# Patient Record
Sex: MALE | Race: WHITE | NOT HISPANIC OR LATINO | Employment: FULL TIME | URBAN - METROPOLITAN AREA
[De-identification: names, ages, dates, MRNs, and addresses within clinical notes are randomized per-mention and may not be internally consistent; named-entity substitution may affect disease eponyms.]

---

## 2017-02-16 ENCOUNTER — HOSPITAL ENCOUNTER (EMERGENCY)
Facility: HOSPITAL | Age: 37
Discharge: HOME | End: 2017-02-16
Attending: EMERGENCY MEDICINE | Admitting: EMERGENCY MEDICINE
Payer: COMMERCIAL

## 2017-02-16 VITALS
DIASTOLIC BLOOD PRESSURE: 79 MMHG | HEART RATE: 81 BPM | RESPIRATION RATE: 20 BRPM | TEMPERATURE: 98.6 F | BODY MASS INDEX: 26.58 KG/M2 | OXYGEN SATURATION: 95 % | WEIGHT: 180 LBS | SYSTOLIC BLOOD PRESSURE: 165 MMHG

## 2017-02-16 DIAGNOSIS — H61.20 CERUMEN IMPACTION: Primary | ICD-10-CM

## 2017-02-16 PROCEDURE — 99282 EMERGENCY DEPT VISIT SF MDM: CPT

## 2018-01-17 NOTE — PROGRESS NOTES
Preliminary Nursing Report                Patient Information    Initial Encounter Entry Date:   9/15/2016 8:36 AM EST (Automated Transmission Automated Transmission)       Last Modified:   {Lisandro Singh}              Legal Name: Ziggy Ritchie,[address] [place] [address] Number:        YOB: 1980        Age (years): 29        Gender: M        Body Mass Index (BMI): 2 kg/m2        Height: 67 in  Weight: 10 94 lbs (5 kgs)           Address:   [address] [place] [address]              Phone: [phone]   (consent to leave messages)        Email:        Ethnicity: Decline to State        Yarsani:        Marital Status:        Preferred Language: English        Race: Other Race                    Patient Insurance Information        Primary Insurance Information Carrier Name: {Primary  CarrierName}           Carrier Address:   {Primary  CarrierAddress}              Carrier Phone: {Primary  CarrierPhone}          Group Number: {Primary  GroupNumber}          Policy Number: {Primary  PolicyNumber}          Insured Name: {Primary  InsuredName}          Insured : {Primary  InsuredDOB}          Relationship to Insured: {Primary  RelationshiptoInsured}           Secondary Insurance Information Carrier Name: {Secondary  CarrierName}           Carrier Address:   {Secondary  CarrierAddress}              Carrier Phone: {Secondary  CarrierPhone}          Group Number: {Secondary  GroupNumber}          Policy Number: {Secondary  PolicyNumber}          Insured Name: {Secondary  InsuredName}          Insured : {Secondary  InsuredDOB}          Relationship to Insured: {Secondary  RelationshiptoInsured}                       Health Profile   Booking #:   Valerie Palacios #: [id number]               DOS: 2016    Surgery : LITHOTRIPSY, EXTRACORPOREAL SHOCK WAVE    Add'l Procedures/Notes:     Surgery Risk: Intermediate          Precautions     BMI                   Allergies    No Known Drug Allergies Medications    PARoxetine HCl - 10 MG Oral Tablet               Conditions    Acid reflux       Back pain       Nephrolithiasis               Family History    None             Surgical History    None             Social History    Never a smoker       No alcohol use                               Patient Instructions       ? NPO Instructions   The day before surgery it is recommended to have a light dinner at your usual time and you are allowed a light snack early in the evening  Do not eat anything heavy or eat a big meal after 7pm  Do not eat or drink anything after midnight prior to your surgery  If you are supposed to take any of your medications, do so with a sip of water  Failure to follow these instructions can lead to an increased risk of lung complications and may result in a delay or cancellation of your procedure  If you have any questions, contact your institution for further instructions  No candy, no gum, no mints, no chewing tobacco   Triggered by: Medical Procedure Risk         ? Reflux Disease   Please continue to take this medication on your normal schedule  If this is an oral medication and you take in the morning, you may do so with a sip of water  Triggered by: Acid reflux         ? SSRI (Antidepressants) 80  Please continue to take this medication on your normal schedule  If this is an oral medication and you take in the morning, you may do so with a sip of water  Triggered by: PARoxetine HCl - 10 MG Oral Tablet               Testing Considerations       No recommendations for this classification  Consultations       ? Primary Care Physician Evaluation   Primary care physician may need to evaluate patient prior to surgery  This is likely NOT necessary if the listed conditions are chronic and stable    Triggered by: Nephrolithiasis               Miscellaneous Questions         Question: Are you able to walk up a flight of stairs, walk up a hill or do heavy housework WITHOUT having chest pain or shortness of breath? Answer: YES                   Allergies/Conditions/Medications Not Found        The following were not recognized by our system when generating the recommendations  Please consider if this would impact any preoperative protocols  ? Never a smoker       ? No alcohol use                  Appointment Information         Date:    11/04/2016        Location:    [place]        Address:           Directions:                      Footnotes revision 14      ?? Denotes a free-text entry  Legal Disclaimer: Any and all recommendations and services provided herein are designed to assist in the preoperative care of the patient  Nothing contained herein is designed to replace, eliminate or alleviate the responsibility of the attending physician to supervise and determine the patient?s preoperative care and course of treatment  Failure to provide complete, accurate information may negatively impact the system?s ability to recommend the proper preoperative protocol  THE ATTENDING PHYSICIAN IS RESPONSIBLE TO REVIEW THE SUGGESTED PREOPERATIVE PROTOCOLS/COURSE OF TREATMENT AND PRESCRIBE THE FINAL COURSE OF PREOPERATIVE TREATMENT IN CONSULTATION WITH THE PATIENT  THE ePREOP SYSTEM AND ITS MATERIALS ARE PROVIDED ? AS IS? WITHOUT WARRANTY OF ANY KIND, EXPRESS OR IMPLIED, INCLUDING, BUT NOT LIMITED TO, WARRANTIES OF PERFORMANCE OR MERCHANTABILITY OR FITNESS FOR A PARTICULAR PURPOSE  PATIENT AND PHYSICIANS HEREBY AGREE THAT THEIR USE OF THE MATERIALS AND RESOURCES ACT AS A CONSENT TO RELEASE AND WAIVE ePREOP FROM ANY AND ALL CLAIMS OF WARRANTY, TORT OR CONTRACT LAW OF ANY KIND  Electronically signed Vijay OJEDA    Sep 15 2016 12:12PM EST

## 2018-03-20 ENCOUNTER — TRANSCRIBE ORDERS (OUTPATIENT)
Dept: LAB | Facility: CLINIC | Age: 38
End: 2018-03-20

## 2018-03-20 ENCOUNTER — APPOINTMENT (OUTPATIENT)
Dept: LAB | Facility: CLINIC | Age: 38
End: 2018-03-20
Payer: COMMERCIAL

## 2018-03-20 DIAGNOSIS — R10.9 STOMACH ACHE: Primary | ICD-10-CM

## 2018-03-20 DIAGNOSIS — R10.9 STOMACH ACHE: ICD-10-CM

## 2018-03-20 LAB
ALBUMIN SERPL BCP-MCNC: 3.9 G/DL (ref 3.5–5)
ALP SERPL-CCNC: 52 U/L (ref 46–116)
ALT SERPL W P-5'-P-CCNC: 117 U/L (ref 12–78)
ANION GAP SERPL CALCULATED.3IONS-SCNC: 7 MMOL/L (ref 4–13)
AST SERPL W P-5'-P-CCNC: 50 U/L (ref 5–45)
BASOPHILS # BLD AUTO: 0.04 THOUSANDS/ΜL (ref 0–0.1)
BASOPHILS NFR BLD AUTO: 1 % (ref 0–1)
BILIRUB SERPL-MCNC: 0.6 MG/DL (ref 0.2–1)
BUN SERPL-MCNC: 15 MG/DL (ref 5–25)
CALCIUM SERPL-MCNC: 9.3 MG/DL (ref 8.3–10.1)
CHLORIDE SERPL-SCNC: 105 MMOL/L (ref 100–108)
CO2 SERPL-SCNC: 29 MMOL/L (ref 21–32)
CREAT SERPL-MCNC: 0.99 MG/DL (ref 0.6–1.3)
EOSINOPHIL # BLD AUTO: 0.15 THOUSAND/ΜL (ref 0–0.61)
EOSINOPHIL NFR BLD AUTO: 4 % (ref 0–6)
ERYTHROCYTE [DISTWIDTH] IN BLOOD BY AUTOMATED COUNT: 12.9 % (ref 11.6–15.1)
ERYTHROCYTE [SEDIMENTATION RATE] IN BLOOD: 2 MM/HOUR (ref 0–10)
GFR SERPL CREATININE-BSD FRML MDRD: 98 ML/MIN/1.73SQ M
GLUCOSE P FAST SERPL-MCNC: 99 MG/DL (ref 65–99)
HCT VFR BLD AUTO: 45 % (ref 36.5–49.3)
HGB BLD-MCNC: 15.6 G/DL (ref 12–17)
LYMPHOCYTES # BLD AUTO: 2.29 THOUSANDS/ΜL (ref 0.6–4.47)
LYMPHOCYTES NFR BLD AUTO: 55 % (ref 14–44)
MCH RBC QN AUTO: 29.1 PG (ref 26.8–34.3)
MCHC RBC AUTO-ENTMCNC: 34.7 G/DL (ref 31.4–37.4)
MCV RBC AUTO: 84 FL (ref 82–98)
MONOCYTES # BLD AUTO: 0.39 THOUSAND/ΜL (ref 0.17–1.22)
MONOCYTES NFR BLD AUTO: 9 % (ref 4–12)
NEUTROPHILS # BLD AUTO: 1.3 THOUSANDS/ΜL (ref 1.85–7.62)
NEUTS SEG NFR BLD AUTO: 31 % (ref 43–75)
PLATELET # BLD AUTO: 266 THOUSANDS/UL (ref 149–390)
PMV BLD AUTO: 9.4 FL (ref 8.9–12.7)
POTASSIUM SERPL-SCNC: 4.5 MMOL/L (ref 3.5–5.3)
PROT SERPL-MCNC: 7.1 G/DL (ref 6.4–8.2)
RBC # BLD AUTO: 5.36 MILLION/UL (ref 3.88–5.62)
SODIUM SERPL-SCNC: 141 MMOL/L (ref 136–145)
WBC # BLD AUTO: 4.17 THOUSAND/UL (ref 4.31–10.16)

## 2018-03-20 PROCEDURE — 85025 COMPLETE CBC W/AUTO DIFF WBC: CPT

## 2018-03-20 PROCEDURE — 80053 COMPREHEN METABOLIC PANEL: CPT

## 2018-03-20 PROCEDURE — 85652 RBC SED RATE AUTOMATED: CPT

## 2018-03-20 PROCEDURE — 36415 COLL VENOUS BLD VENIPUNCTURE: CPT

## 2018-04-24 ENCOUNTER — OFFICE VISIT (OUTPATIENT)
Dept: GASTROENTEROLOGY | Facility: AMBULARY SURGERY CENTER | Age: 38
End: 2018-04-24
Payer: COMMERCIAL

## 2018-04-24 ENCOUNTER — TELEPHONE (OUTPATIENT)
Dept: GASTROENTEROLOGY | Facility: MEDICAL CENTER | Age: 38
End: 2018-04-24

## 2018-04-24 ENCOUNTER — APPOINTMENT (OUTPATIENT)
Dept: LAB | Facility: CLINIC | Age: 38
End: 2018-04-24
Payer: COMMERCIAL

## 2018-04-24 ENCOUNTER — TRANSCRIBE ORDERS (OUTPATIENT)
Dept: LAB | Facility: CLINIC | Age: 38
End: 2018-04-24

## 2018-04-24 VITALS
WEIGHT: 192 LBS | HEART RATE: 78 BPM | DIASTOLIC BLOOD PRESSURE: 70 MMHG | HEIGHT: 69 IN | TEMPERATURE: 98 F | SYSTOLIC BLOOD PRESSURE: 130 MMHG | BODY MASS INDEX: 28.44 KG/M2

## 2018-04-24 DIAGNOSIS — K21.9 CHRONIC GERD: ICD-10-CM

## 2018-04-24 DIAGNOSIS — R79.89 ABNORMAL LFTS: ICD-10-CM

## 2018-04-24 DIAGNOSIS — R19.4 CHANGE IN BOWEL HABITS: Primary | ICD-10-CM

## 2018-04-24 DIAGNOSIS — R19.7 DIARRHEA, UNSPECIFIED TYPE: Primary | ICD-10-CM

## 2018-04-24 DIAGNOSIS — R19.7 DIARRHEA, UNSPECIFIED TYPE: ICD-10-CM

## 2018-04-24 LAB
ALBUMIN SERPL BCP-MCNC: 3.9 G/DL (ref 3.5–5)
ALP SERPL-CCNC: 57 U/L (ref 46–116)
ALT SERPL W P-5'-P-CCNC: 126 U/L (ref 12–78)
ANION GAP SERPL CALCULATED.3IONS-SCNC: 9 MMOL/L (ref 4–13)
AST SERPL W P-5'-P-CCNC: 57 U/L (ref 5–45)
BILIRUB SERPL-MCNC: 0.7 MG/DL (ref 0.2–1)
BUN SERPL-MCNC: 11 MG/DL (ref 5–25)
CALCIUM SERPL-MCNC: 9 MG/DL (ref 8.3–10.1)
CHLORIDE SERPL-SCNC: 105 MMOL/L (ref 100–108)
CO2 SERPL-SCNC: 26 MMOL/L (ref 21–32)
CREAT SERPL-MCNC: 0.98 MG/DL (ref 0.6–1.3)
GFR SERPL CREATININE-BSD FRML MDRD: 99 ML/MIN/1.73SQ M
GLUCOSE P FAST SERPL-MCNC: 105 MG/DL (ref 65–99)
POTASSIUM SERPL-SCNC: 4.2 MMOL/L (ref 3.5–5.3)
PROT SERPL-MCNC: 7.3 G/DL (ref 6.4–8.2)
SODIUM SERPL-SCNC: 140 MMOL/L (ref 136–145)
TSH SERPL DL<=0.05 MIU/L-ACNC: 3.11 UIU/ML (ref 0.36–3.74)

## 2018-04-24 PROCEDURE — 86803 HEPATITIS C AB TEST: CPT

## 2018-04-24 PROCEDURE — 86704 HEP B CORE ANTIBODY TOTAL: CPT

## 2018-04-24 PROCEDURE — 87340 HEPATITIS B SURFACE AG IA: CPT

## 2018-04-24 PROCEDURE — 36415 COLL VENOUS BLD VENIPUNCTURE: CPT

## 2018-04-24 PROCEDURE — 86255 FLUORESCENT ANTIBODY SCREEN: CPT

## 2018-04-24 PROCEDURE — 82784 ASSAY IGA/IGD/IGG/IGM EACH: CPT

## 2018-04-24 PROCEDURE — 86705 HEP B CORE ANTIBODY IGM: CPT

## 2018-04-24 PROCEDURE — 99244 OFF/OP CNSLTJ NEW/EST MOD 40: CPT | Performed by: INTERNAL MEDICINE

## 2018-04-24 PROCEDURE — 84443 ASSAY THYROID STIM HORMONE: CPT

## 2018-04-24 PROCEDURE — 83516 IMMUNOASSAY NONANTIBODY: CPT

## 2018-04-24 PROCEDURE — 80053 COMPREHEN METABOLIC PANEL: CPT

## 2018-04-24 RX ORDER — POLYETHYLENE GLYCOL 3350 17 G/17G
238 POWDER, FOR SOLUTION ORAL ONCE
Qty: 238 G | Refills: 0 | Status: SHIPPED | OUTPATIENT
Start: 2018-04-24 | End: 2019-08-13 | Stop reason: ALTCHOICE

## 2018-04-24 RX ORDER — OMEPRAZOLE 10 MG/1
10 CAPSULE, DELAYED RELEASE ORAL DAILY
COMMUNITY
End: 2020-07-31 | Stop reason: SDUPTHER

## 2018-04-24 RX ORDER — DICYCLOMINE HYDROCHLORIDE 10 MG/1
10 CAPSULE ORAL
Qty: 90 CAPSULE | Refills: 3 | Status: SHIPPED | OUTPATIENT
Start: 2018-04-24 | End: 2020-09-09

## 2018-04-24 NOTE — PROGRESS NOTES
Consultation - [facility]  Anne-Marie Langford 40 y o  male MRN: [id number]  Unit/Bed#:  Encounter: [id number]        Consults    ASSESSMENT/PLAN:   1  Change in bowel habits with Diarrhea for 3 months: 3-4 times a day and mainly post prandial  Non bloody  No recent change in medications  Differential includes IBS D versus inflammatory bowel disease versus less likely infectious  Patient does have family history of Crohn's disease in his father   - Hgb 15 6, platelets 128   - check TSH and celiac serologies  - obtain colonoscopy  - check stool studies and inflammatory markers  - start probiotic and low dose Bentyl PRN  -avoid dairy products  2  Longstanding GERD: improved with Protonix 40mg daily  - will plan for EGD to assess for PUD vs gastritis  3  Abnormal LFTs with AST 50 and   No new medications and no history of IV drug use or blood transfusions  Suspect transaminitis may be due to an acute viral infection versus fatty liver disease  No reports of alcohol use  - Check CMP  - check chronic hepatitis panel    - obtain RUQ US for abnormal lfts and to assess for biliary colic given abdominal pain             ______________________________________________________________________    Reason for Consult / Principal Problem: [unfilled]    HPI: Anne-Marie Langford is a 40y o  year old male with history of longstanding GERD, comes in for evaluation of recent onset of diarrhea  Patient states that he use to have normal bowel movements prior to 3 months ago however has most recently been having 3-4 watery bowel movements which are postprandial   He states that he has noticed diarrhea more with dairy products  He denies any nocturnal symptoms, no rectal bleeding or unintentional weight loss  He does endorse abdominal cramping prior to diarrhea  He also had symptoms of acid reflux and was recently started on omeprazole 40 mg with good control of symptoms    He denies dysphagia, hematemesis, melena, nausea or vomiting or unintentional weight loss  He is a nonsmoker and does not drink alcohol  He denies family history of GI malignancy  He does endorse use of NSAIDs which she takes for joint aches  He states that he has been taking Imodium for diarrhea  No recent travel or antibiotic use  No sick contacts  Review of Systems: The remainder of the review of systems was negative except for the pertinent positives noted in HPI  Historical Information   Past Medical History:   Diagnosis Date    Psychiatric disorder     depression    Renal disorder     stones     Past Surgical History:   Procedure Laterality Date    NV FRAGMENT KIDNEY STONE/ ESWL Left 11/4/2016    Procedure: LITHROTRIPSY EXTRACORPORAL SHOCKWAVE (ESWL); Surgeon: Jame Bradley MD;  [facility];  Service: Urology    TOOTH EXTRACTION       Social History   History   Alcohol Use    Yes     Comment: socially     History   Drug Use No     History   Smoking Status    Light Tobacco Smoker    Last attempt to quit: 10/4/2016   Smokeless Tobacco    Never Used     Comment: 1 cig daily, trying to quit 4/2018  Family History   Problem Relation Age of Onset   Sophia Rojas Migraines Mother     Crohn's disease Father     Liver disease Brother     Tourette syndrome Brother     Diabetes Paternal Grandmother        Meds/Allergies       (Not in a hospital admission)  No current facility-administered medications for this visit  No Known Allergies    Objective     Blood pressure 130/70, pulse 78, temperature 98 °F (36 7 °C), temperature source Tympanic, height 5' 9" (1 753 m), weight 87 1 kg (192 lb)      [unfilled]    PHYSICAL EXAM     GEN: well nourished, well developed, no acute distress  HEENT: anicteric, MMM, no cervical or supraclavicular lymphadenopathy  CV: RRR, no m/r/g  CHEST: CTA b/l, no WRR  ABD: +BS, soft, NT/ND, no hepatosplenomegaly  EXT: no c/c/e  SKIN: no rashes,  NEURO: aaox3    Lab Results:   No visits with results within 1 Day(s) from this visit     Latest known visit with results is:   Appointment on 03/20/2018   Component Date Value    Sodium 03/20/2018 141     Potassium 03/20/2018 4 5     Chloride 03/20/2018 105     CO2 03/20/2018 29     Anion Gap 03/20/2018 7     BUN 03/20/2018 15     Creatinine 03/20/2018 0 99     Glucose, Fasting 03/20/2018 99     Calcium 03/20/2018 9 3     AST 03/20/2018 50*    ALT 03/20/2018 117*    Alkaline Phosphatase 03/20/2018 52     Total Protein 03/20/2018 7 1     Albumin 03/20/2018 3 9     Total Bilirubin 03/20/2018 0 60     eGFR 03/20/2018 98     WBC 03/20/2018 4 17*    RBC 03/20/2018 5 36     Hemoglobin 03/20/2018 15 6     Hematocrit 03/20/2018 45 0     MCV 03/20/2018 84     MCH 03/20/2018 29 1     MCHC 03/20/2018 34 7     RDW 03/20/2018 12 9     MPV 03/20/2018 9 4     Platelets 20/62/5865 266     Neutrophils Relative 03/20/2018 31*    Lymphocytes Relative 03/20/2018 55*    Monocytes Relative 03/20/2018 9     Eosinophils Relative 03/20/2018 4     Basophils Relative 03/20/2018 1     Neutrophils Absolute 03/20/2018 1 30*    Lymphocytes Absolute 03/20/2018 2 29     Monocytes Absolute 03/20/2018 0 39     Eosinophils Absolute 03/20/2018 0 15     Basophils Absolute 03/20/2018 0 04     Sed Rate 03/20/2018 2      Imaging Studies: I have personally reviewed pertinent films in PACS

## 2018-04-24 NOTE — LETTER
April 24, 2018     [facility] [address] [address]    Patient: Wendy Adan   YOB: 1980   Date of Visit: 4/24/2018       Dear Dr Laure Smiley:    Thank you for referring Rupinder Naylor to me for evaluation  Below are my notes for this consultation  If you have questions, please do not hesitate to call me  I look forward to following your patient along with you           Sincerely,        Shiv Velasquez MD        CC: No Recipients

## 2018-04-24 NOTE — TELEPHONE ENCOUNTER
Spoke with pharmacy  Suprep not covered  I ordered dulcolax/miralax   Please mail new instructions to patient

## 2018-04-24 NOTE — TELEPHONE ENCOUNTER
Dr Lambert Rolling pt  Martita Mackenzie 41 called wanting some clarification on the [facility] med   Pharmacist can be reached at [phone]

## 2018-04-25 ENCOUNTER — APPOINTMENT (OUTPATIENT)
Dept: LAB | Facility: CLINIC | Age: 38
End: 2018-04-25
Payer: COMMERCIAL

## 2018-04-25 ENCOUNTER — TELEPHONE (OUTPATIENT)
Dept: GASTROENTEROLOGY | Facility: CLINIC | Age: 38
End: 2018-04-25

## 2018-04-25 DIAGNOSIS — K21.9 CHRONIC GERD: ICD-10-CM

## 2018-04-25 DIAGNOSIS — R79.89 ABNORMAL LFTS: ICD-10-CM

## 2018-04-25 DIAGNOSIS — R74.01 TRANSAMINITIS: Primary | ICD-10-CM

## 2018-04-25 DIAGNOSIS — R19.7 DIARRHEA, UNSPECIFIED TYPE: ICD-10-CM

## 2018-04-25 LAB
ENDOMYSIUM IGA SER QL: NEGATIVE
GLIADIN PEPTIDE IGA SER-ACNC: 7 UNITS (ref 0–19)
GLIADIN PEPTIDE IGG SER-ACNC: 2 UNITS (ref 0–19)
HBV CORE AB SER QL: NORMAL
HBV CORE IGM SER QL: NORMAL
HBV SURFACE AG SER QL: NORMAL
HCV AB SER QL: NORMAL
IGA SERPL-MCNC: 129 MG/DL (ref 90–386)
TTG IGA SER-ACNC: <2 U/ML (ref 0–3)
TTG IGG SER-ACNC: 3 U/ML (ref 0–5)

## 2018-04-25 PROCEDURE — 87209 SMEAR COMPLEX STAIN: CPT

## 2018-04-25 PROCEDURE — 87177 OVA AND PARASITES SMEARS: CPT

## 2018-04-25 PROCEDURE — 87505 NFCT AGENT DETECTION GI: CPT

## 2018-04-25 PROCEDURE — 87493 C DIFF AMPLIFIED PROBE: CPT

## 2018-04-26 LAB
C DIFF TOX GENS STL QL NAA+PROBE: NORMAL
CAMPYLOBACTER DNA SPEC NAA+PROBE: NORMAL
SALMONELLA DNA SPEC QL NAA+PROBE: NORMAL
SHIGA TOXIN STX GENE SPEC NAA+PROBE: NORMAL
SHIGELLA DNA SPEC QL NAA+PROBE: NORMAL

## 2018-04-27 DIAGNOSIS — R74.8 ELEVATED LIVER ENZYMES: Primary | ICD-10-CM

## 2018-04-27 NOTE — TELEPHONE ENCOUNTER
Spoke with pt, gave him central scheduling # to call   Pt was advised he will get a call when all results including labs and US are done

## 2018-04-30 LAB — O+P STL CONC: NORMAL

## 2018-05-02 ENCOUNTER — TELEPHONE (OUTPATIENT)
Dept: GASTROENTEROLOGY | Facility: MEDICAL CENTER | Age: 38
End: 2018-05-02

## 2018-05-03 ENCOUNTER — HOSPITAL ENCOUNTER (OUTPATIENT)
Dept: ULTRASOUND IMAGING | Facility: HOSPITAL | Age: 38
Discharge: HOME | End: 2018-05-03
Payer: COMMERCIAL

## 2018-05-03 ENCOUNTER — APPOINTMENT (OUTPATIENT)
Dept: LAB | Facility: CLINIC | Age: 38
End: 2018-05-03
Payer: COMMERCIAL

## 2018-05-03 DIAGNOSIS — R74.8 ELEVATED LIVER ENZYMES: ICD-10-CM

## 2018-05-03 DIAGNOSIS — R74.01 TRANSAMINITIS: ICD-10-CM

## 2018-05-03 LAB
IRON SATN MFR SERPL: 25 %
IRON SERPL-MCNC: 105 UG/DL (ref 65–175)
TIBC SERPL-MCNC: 420 UG/DL (ref 250–450)

## 2018-05-03 PROCEDURE — 83540 ASSAY OF IRON: CPT

## 2018-05-03 PROCEDURE — 86038 ANTINUCLEAR ANTIBODIES: CPT

## 2018-05-03 PROCEDURE — 86255 FLUORESCENT ANTIBODY SCREEN: CPT

## 2018-05-03 PROCEDURE — 82784 ASSAY IGA/IGD/IGG/IGM EACH: CPT

## 2018-05-03 PROCEDURE — 83550 IRON BINDING TEST: CPT

## 2018-05-03 PROCEDURE — 36415 COLL VENOUS BLD VENIPUNCTURE: CPT

## 2018-05-03 PROCEDURE — 86256 FLUORESCENT ANTIBODY TITER: CPT

## 2018-05-03 PROCEDURE — 82103 ALPHA-1-ANTITRYPSIN TOTAL: CPT

## 2018-05-03 PROCEDURE — 86235 NUCLEAR ANTIGEN ANTIBODY: CPT

## 2018-05-03 PROCEDURE — 82390 ASSAY OF CERULOPLASMIN: CPT

## 2018-05-03 PROCEDURE — 83516 IMMUNOASSAY NONANTIBODY: CPT

## 2018-05-03 PROCEDURE — 76705 ECHO EXAM OF ABDOMEN: CPT

## 2018-05-04 ENCOUNTER — TELEPHONE (OUTPATIENT)
Dept: GASTROENTEROLOGY | Facility: AMBULARY SURGERY CENTER | Age: 38
End: 2018-05-04

## 2018-05-04 LAB
A1AT SERPL-MCNC: 117 MG/DL (ref 90–200)
ACTIN IGG SERPL-ACNC: 7 UNITS (ref 0–19)
CERULOPLASMIN SERPL-MCNC: 25.5 MG/DL (ref 16–31)
ENDOMYSIUM IGA SER QL: NEGATIVE
GLIADIN PEPTIDE IGA SER-ACNC: 2 UNITS (ref 0–19)
GLIADIN PEPTIDE IGG SER-ACNC: 2 UNITS (ref 0–19)
IGA SERPL-MCNC: 141 MG/DL (ref 90–386)
MITOCHONDRIA M2 IGG SER-ACNC: 94.1 UNITS (ref 0–20)
RYE IGE QN: NEGATIVE
TTG IGA SER-ACNC: <2 U/ML (ref 0–3)
TTG IGG SER-ACNC: <2 U/ML (ref 0–5)

## 2018-05-04 NOTE — TELEPHONE ENCOUNTER
----- Message from Antwon Cruz PA-C sent at 5/4/2018  7:19 AM EDT -----  Please inform patient that his liver ultrasound showed signs of fatty liver  This is likely the source of his mildly elevated liver tests  There is no permanent damage (known as cirrhosis) at this time  To prevent worsening of fatty liver and lower the risk of developing permanent scarring and dysfunction of the liver,patient should consider a low fat diet, weight loss, and exercise  He should also avoid excessive alcohol consumption

## 2018-05-10 ENCOUNTER — TELEPHONE (OUTPATIENT)
Dept: GASTROENTEROLOGY | Facility: AMBULARY SURGERY CENTER | Age: 38
End: 2018-05-10

## 2018-05-10 NOTE — TELEPHONE ENCOUNTER
----- Message from John Leung MD sent at 5/7/2018  5:46 PM EDT -----  Patient is found to have a significantly elevated AMA, which is concerning for autoimmune process such as PBC-I called to discuss these results however got a voicemail, I have left a message for the patient asking him to schedule of appointment with either myself or with PA in the next 4-6 weeks  Meanwhile avoid hepatotoxic medications  Avoid alcohol use and continue weight loss with exercise

## 2018-05-30 ENCOUNTER — ANESTHESIA EVENT (OUTPATIENT)
Dept: PERIOP | Facility: AMBULARY SURGERY CENTER | Age: 38
End: 2018-05-30
Payer: COMMERCIAL

## 2018-06-11 ENCOUNTER — HOSPITAL ENCOUNTER (OUTPATIENT)
Facility: AMBULARY SURGERY CENTER | Age: 38
Setting detail: OUTPATIENT SURGERY
Discharge: HOME | End: 2018-06-11
Attending: INTERNAL MEDICINE | Admitting: INTERNAL MEDICINE
Payer: COMMERCIAL

## 2018-06-11 ENCOUNTER — ANESTHESIA (OUTPATIENT)
Dept: PERIOP | Facility: AMBULARY SURGERY CENTER | Age: 38
End: 2018-06-11
Payer: COMMERCIAL

## 2018-06-11 VITALS
OXYGEN SATURATION: 98 % | HEART RATE: 75 BPM | HEIGHT: 68 IN | WEIGHT: 184 LBS | BODY MASS INDEX: 27.89 KG/M2 | TEMPERATURE: 97.8 F | DIASTOLIC BLOOD PRESSURE: 80 MMHG | RESPIRATION RATE: 18 BRPM | SYSTOLIC BLOOD PRESSURE: 118 MMHG

## 2018-06-11 DIAGNOSIS — R79.89 ABNORMAL LFTS: ICD-10-CM

## 2018-06-11 DIAGNOSIS — K21.9 CHRONIC GERD: ICD-10-CM

## 2018-06-11 DIAGNOSIS — R19.7 DIARRHEA, UNSPECIFIED TYPE: ICD-10-CM

## 2018-06-11 PROCEDURE — 88305 TISSUE EXAM BY PATHOLOGIST: CPT | Performed by: PATHOLOGY

## 2018-06-11 PROCEDURE — 43239 EGD BIOPSY SINGLE/MULTIPLE: CPT | Performed by: INTERNAL MEDICINE

## 2018-06-11 PROCEDURE — 88342 IMHCHEM/IMCYTCHM 1ST ANTB: CPT | Performed by: PATHOLOGY

## 2018-06-11 PROCEDURE — 45380 COLONOSCOPY AND BIOPSY: CPT | Performed by: INTERNAL MEDICINE

## 2018-06-11 RX ORDER — SODIUM CHLORIDE 9 MG/ML
125 INJECTION, SOLUTION INTRAVENOUS CONTINUOUS
Status: DISCONTINUED | OUTPATIENT
Start: 2018-06-11 | End: 2018-06-11 | Stop reason: HOSPADM

## 2018-06-11 RX ORDER — PROPOFOL 10 MG/ML
INJECTION, EMULSION INTRAVENOUS AS NEEDED
Status: DISCONTINUED | OUTPATIENT
Start: 2018-06-11 | End: 2018-06-11 | Stop reason: SURG

## 2018-06-11 RX ADMIN — PROPOFOL 20 MG: 10 INJECTION, EMULSION INTRAVENOUS at 08:45

## 2018-06-11 RX ADMIN — PROPOFOL 150 MG: 10 INJECTION, EMULSION INTRAVENOUS at 08:46

## 2018-06-11 RX ADMIN — PROPOFOL 100 MG: 10 INJECTION, EMULSION INTRAVENOUS at 08:51

## 2018-06-11 RX ADMIN — SODIUM CHLORIDE: 0.9 INJECTION, SOLUTION INTRAVENOUS at 08:42

## 2018-06-11 RX ADMIN — PROPOFOL 100 MG: 10 INJECTION, EMULSION INTRAVENOUS at 09:01

## 2018-06-11 RX ADMIN — PROPOFOL 100 MG: 10 INJECTION, EMULSION INTRAVENOUS at 08:56

## 2018-06-11 RX ADMIN — PROPOFOL 150 MG: 10 INJECTION, EMULSION INTRAVENOUS at 08:49

## 2018-06-11 RX ADMIN — PROPOFOL 80 MG: 10 INJECTION, EMULSION INTRAVENOUS at 08:44

## 2018-06-11 NOTE — OP NOTE
ESOPHAGOGASTRODUODENOSCOPY    PROCEDURE: EGD    SEDATION: Monitored anesthesia care, check anesthesia records    ASA Class: 2    INDICATIONS:  Longstanding GERD    CONSENT:  Informed consent was obtained for the procedure, including sedation after explaining the risks and benefits of the procedure  Risks including but not limited to bleeding, perforation, infection, and missed lesion  PREPARATION:   Telemetry, pulse oximetry, blood pressure were monitored throughout the procedure  Patient was identified by myself both verbally and by visual inspection of ID band  DESCRIPTION:   Patient was placed in the left lateral decubitus position and was sedated with the above medication  The gastroscope was introduced in to the oropharynx and the esophagus was intubated under direct visualization  Scope was passed down the esophagus up to 2nd part of the duodenum  A careful inspection was made as the gastroscope was withdrawn, including a retroflexed view of the stomach; findings and interventions are described below  FINDINGS:    #1  Esophagus-normal appearing esophageal mucosa with regular Z-line noted at 44 cm  #2  Stomach-mild cobblestoning noted in the fundus and cardia suggestive of chronic gastritis, remainder of the gastric mucosa appeared unremarkable, biopsies were obtained nonetheless to assess for H pylori  Retroflexed view was normal     #3  Duodenum-normal appearing duodenal mucosa within the bulb, duodenal sweep and D2  Biopsies were obtained to assess for celiac disease  IMPRESSIONS:      1   Mild chronic gastritis  RECOMMENDATIONS:     1  Follow-up biopsy results in 2-3 weeks  2   Continue Protonix 40 mg for now  3   Anti-reflux diet  4   Avoid NSAIDs  COMPLICATIONS:  None; patient tolerated the procedure well            DISPOSITION: PACU           CONDITION: Stable

## 2018-06-11 NOTE — OP NOTE
Colonoscopy Procedure Note    Procedure: Colonoscopy    Sedation: Monitored anesthesia care, check anesthesia records      ASA Class: 2    INDICATIONS: Diarrhea, Chronic    POST-OP DIAGNOSIS: See the impression below    Procedure Details     Prior colonoscopy: No prior colonoscopy  Informed consent was obtained for the procedure, including sedation  Risks of perforation, hemorrhage, adverse drug reaction and aspiration were discussed  The patient was placed in the left lateral decubitus position  Based on the pre-procedure assessment, including review of the patient's medical history, medications, allergies, and review of systems, he had been deemed to be an appropriate candidate for conscious sedation; he was therefore sedated with the medications listed below  The patient was monitored continuously with telemetry, pulse oximetry, blood pressure monitoring, and direct observations  A rectal examination was performed  The variable-stiffness pediatric colonoscope was inserted into the rectum and advanced under direct vision to the terminal ileum  The quality of the colonic preparation was good  A careful inspection was made as the colonoscope was withdrawn, including a retroflexed view of the rectum; findings and interventions are described below  Findings:  1  Normal appearing terminal ileal mucosa noted in the distal 15 cm  2   Normal appearing colonic mucosa noted throughout, random biopsies were taken from the ascending and descending colon to assess for microscopic colitis given history of diarrhea  3   Retroflexed view revealed diminutive internal hemorrhoids  Complications:  None; patient tolerated the procedure well  Impression:    1  Diminutive internal hemorrhoids  Recommendations: Follow-up biopsy results in 2-3 weeks  Low FODMAP diet  Repeat colonoscopy when age-appropriate screening is due

## 2018-06-11 NOTE — ANESTHESIA PREPROCEDURE EVALUATION
Review of Systems/Medical History  Patient summary reviewed  Chart reviewed      Cardiovascular  Exercise tolerance (METS): >4,     Pulmonary  Negative pulmonary ROS Smoker ex-smoker  ,        GI/Hepatic    GERD well controlled,        Negative  ROS        Endo/Other  Negative endo/other ROS      GYN       Hematology  Negative hematology ROS      Musculoskeletal  Negative musculoskeletal ROS        Neurology  Negative neurology ROS      Psychology   Depression ,              Physical Exam    Airway    Mallampati score: II  TM Distance: >3 FB  Neck ROM: full     Dental       Cardiovascular  Cardiovascular exam normal    Pulmonary  Pulmonary exam normal     Other Findings        Anesthesia Plan  ASA Score- 2     Anesthesia Type- IV sedation with anesthesia with ASA Monitors  Additional Monitors:   Airway Plan:         Plan Factors-  Patient did not smoke on day of surgery  Induction- intravenous  Postoperative Plan-     Informed Consent- Anesthetic plan and risks discussed with patient  I personally reviewed this patient with the CRNA  Discussed and agreed on the Anesthesia Plan with the CRNA  Agnes Pineda

## 2018-06-11 NOTE — H&P
History and Physical -  [facility]  Annmarie White 40 y o  male MRN: [id number]    HPI: Annmarie White is a 40y o  year old male who presents for evaluation of longstanding GERD and change in bowel habits with diarrhea symptoms  Review of Systems    Historical Information   Past Medical History:   Diagnosis Date    Psychiatric disorder     depression    Renal disorder     stones     Past Surgical History:   Procedure Laterality Date    [place] FRAGMENT KIDNEY STONE/ ESWL Left 11/4/2016    Procedure: LITHROTRIPSY EXTRACORPORAL SHOCKWAVE (ESWL); Surgeon: Leonela Kim MD;  [facility];  Service: Urology    TOOTH EXTRACTION       Social History   History   Alcohol Use    Yes     Comment: socially     History   Drug Use No     History   Smoking Status    Light Tobacco Smoker    Last attempt to quit: 10/4/2016   Smokeless Tobacco    Never Used     Comment: 1 cig daily, trying to quit 4/2018  Family History   Problem Relation Age of Onset   Darnell Mario Migraines Mother     Crohn's disease Father     Liver disease Brother     Tourette syndrome Brother     Diabetes Paternal Grandmother        Meds/Allergies     Prescriptions Prior to Admission   Medication    dicyclomine (BENTYL) 10 mg capsule    omeprazole (PriLOSEC) 10 mg delayed release capsule    PARoxetine (PAXIL) 10 mg tablet    bisacodyl (DULCOLAX) 5 mg EC tablet    polyethylene glycol (GLYCOLAX) powder       No Known Allergies    Objective     Blood pressure 141/86, pulse 77, temperature 97 8 °F (36 6 °C), temperature source Temporal, resp  rate 18, height 5' 8" (1 727 m), weight 83 5 kg (184 lb), SpO2 99 %  PHYSICAL EXAM    Gen: NAD  CV: RRR  CHEST: Clear  ABD: soft, NT/ND  EXT: no edema  Neuro: AAO      ASSESSMENT/PLAN:  This is a 40y o  year old male here for evaluation of change in bowel habits with predominant diarrhea for the past 6 months along with longstanding GERD      PLAN:   Procedure:  EGD and colonoscopy

## 2018-06-25 ENCOUNTER — TELEPHONE (OUTPATIENT)
Dept: GASTROENTEROLOGY | Facility: AMBULARY SURGERY CENTER | Age: 38
End: 2018-06-25

## 2018-06-25 NOTE — TELEPHONE ENCOUNTER
----- Message from Tyree Jacobs MD sent at 6/25/2018  2:08 PM EDT -----  Please inform the patient that the biopsies from the EGD were unremarkable, there is no evidence of H pylori or celiac disease  No need for repeat EGD unless alarm symptoms  Continue PPI for now  Follow-up in the office in 3 months with PA  Biopsies from the colon were negative for microscopic colitis, there was no evidence of inflammatory bowel disease on exam   Repeat colonoscopy at age 40

## 2019-08-13 ENCOUNTER — HOSPITAL ENCOUNTER (EMERGENCY)
Facility: HOSPITAL | Age: 39
Discharge: HOME | End: 2019-08-13
Attending: EMERGENCY MEDICINE | Admitting: EMERGENCY MEDICINE
Payer: COMMERCIAL

## 2019-08-13 VITALS
TEMPERATURE: 98 F | HEIGHT: 68 IN | RESPIRATION RATE: 18 BRPM | SYSTOLIC BLOOD PRESSURE: 143 MMHG | OXYGEN SATURATION: 99 % | HEART RATE: 69 BPM | DIASTOLIC BLOOD PRESSURE: 92 MMHG | BODY MASS INDEX: 27.9 KG/M2 | WEIGHT: 184.08 LBS

## 2019-08-13 DIAGNOSIS — M54.50 ACUTE BILATERAL LOW BACK PAIN WITHOUT SCIATICA: Primary | ICD-10-CM

## 2019-08-13 PROCEDURE — 99284 EMERGENCY DEPT VISIT MOD MDM: CPT | Performed by: EMERGENCY MEDICINE

## 2019-08-13 PROCEDURE — 96372 THER/PROPH/DIAG INJ SC/IM: CPT

## 2019-08-13 PROCEDURE — 99283 EMERGENCY DEPT VISIT LOW MDM: CPT

## 2019-08-13 RX ORDER — KETOROLAC TROMETHAMINE 30 MG/ML
30 INJECTION, SOLUTION INTRAMUSCULAR; INTRAVENOUS ONCE
Status: COMPLETED | OUTPATIENT
Start: 2019-08-13 | End: 2019-08-13

## 2019-08-13 RX ORDER — CYCLOBENZAPRINE HCL 10 MG
10 TABLET ORAL ONCE
Status: COMPLETED | OUTPATIENT
Start: 2019-08-13 | End: 2019-08-13

## 2019-08-13 RX ORDER — CYCLOBENZAPRINE HCL 10 MG
10 TABLET ORAL 3 TIMES DAILY PRN
Qty: 20 TABLET | Refills: 0 | Status: SHIPPED | OUTPATIENT
Start: 2019-08-13 | End: 2020-10-05 | Stop reason: ALTCHOICE

## 2019-08-13 RX ORDER — NAPROXEN SODIUM 550 MG/1
550 TABLET ORAL 2 TIMES DAILY WITH MEALS
Qty: 20 TABLET | Refills: 0 | Status: SHIPPED | OUTPATIENT
Start: 2019-08-13 | End: 2020-10-05 | Stop reason: ALTCHOICE

## 2019-08-13 RX ADMIN — CYCLOBENZAPRINE HYDROCHLORIDE 10 MG: 10 TABLET, FILM COATED ORAL at 04:57

## 2019-08-13 RX ADMIN — KETOROLAC TROMETHAMINE 30 MG: 30 INJECTION, SOLUTION INTRAMUSCULAR at 04:57

## 2019-08-13 NOTE — ED PROVIDER NOTES
History  Chief Complaint   Patient presents with    Back Pain     Pt reports mid back pain x1 day  Pt denies any difficulty urinating or any other symptoms  Patient is a 41year old male with worsening lower back pain since yesterday AM  No trauma  Does lifting at work at [employer]  No fever  No numbness or weakness  No N/V  No urinary sx or incontinence  No weight loss or rash  States he did not drive here  Tried advil with some relief yesterday  Was last seen in this ED on 2/16/17 for cerumen impaction  [facility] website checked on this patient and no Rx found  History provided by:  Patient and parent   used: No    Back Pain   Associated symptoms: no fever, no numbness and no weakness        Prior to Admission Medications   Prescriptions Last Dose Informant Patient Reported? Taking? PARoxetine (PAXIL) 10 mg tablet 8/12/2019 at Unknown time  Yes Yes   Sig: Take 10 mg by mouth every morning  dicyclomine (BENTYL) 10 mg capsule More than a month at Unknown time  No No   Sig: Take 1 capsule (10 mg total) by mouth 3 (three) times a day before meals   omeprazole (PriLOSEC) 10 mg delayed release capsule 8/12/2019 at Unknown time Self Yes Yes   Sig: Take 10 mg by mouth daily      Facility-Administered Medications: None       Past Medical History:   Diagnosis Date    Psychiatric disorder     depression    Renal disorder     stones       Past Surgical History:   Procedure Laterality Date    MA COLONOSCOPY FLX DX W/COLLJ SPEC WHEN PFRMD N/A 6/11/2018    Procedure: EGD AND COLONOSCOPY;  Surgeon: Prakash Sheppard MD;  [facility]; Service: Gastroenterology    MA FRAGMENT KIDNEY STONE/ ESWL Left 11/4/2016    Procedure: Ozella Freeze SHOCKWAVE (ESWL);   Surgeon: Karyna Toussaint MD;  [facility];  Service: Urology    TOOTH EXTRACTION         Family History   Problem Relation Age of Onset   [facility] Migraines Mother     Crohn's disease Father     Liver disease Brother     Tourette syndrome Brother     Diabetes Paternal Grandmother      I have reviewed and agree with the history as documented  Social History     Tobacco Use    Smoking status: Light Tobacco Smoker     Last attempt to quit: 10/4/2016     Years since quittin 8    Smokeless tobacco: Never Used    Tobacco comment: 1 cig daily, trying to quit 2018  Substance Use Topics    Alcohol use: Yes     Comment: socially    Drug use: No        Review of Systems   Constitutional: Negative for fever and unexpected weight change  Gastrointestinal: Negative for nausea and vomiting  Genitourinary: Negative for difficulty urinating  Musculoskeletal: Positive for back pain  Neurological: Negative for weakness and numbness  All other systems reviewed and are negative  Physical Exam  Physical Exam   Constitutional: He is oriented to person, place, and time  He appears well-developed and well-nourished  He appears distressed (moderate)  HENT:   Head: Normocephalic and atraumatic  Moist mucous membranes  Eyes: No scleral icterus  Neck: No JVD present  No tracheal deviation present  Cardiovascular: Normal rate, regular rhythm and normal heart sounds  No murmur heard  Pulmonary/Chest: Effort normal and breath sounds normal  No respiratory distress  Abdominal: Soft  Bowel sounds are normal  He exhibits no distension  There is no tenderness  Musculoskeletal: He exhibits tenderness (+) paravertebral lumbar tenderness with some spasm bilaterally  Sherre Soulier He exhibits no edema or deformity  (-) SLR  Neurological: He is alert and oriented to person, place, and time  No sensory deficit  No focal deficits  Normal strength  Skin: Skin is warm and dry  No rash noted  Psychiatric: He has a normal mood and affect  Nursing note and vitals reviewed        Vital Signs  ED Triage Vitals   Temperature Pulse Respirations Blood Pressure SpO2   19 0440 19 0437 19 0437 19 0437 08/13/19 0437   98 °F (36 7 °C) 69 18 143/92 99 %      Temp Source Heart Rate Source Patient Position - Orthostatic VS BP Location FiO2 (%)   08/13/19 0440 08/13/19 0437 08/13/19 0437 08/13/19 0437 --   Oral Monitor Sitting Right arm       Pain Score       08/13/19 0437       8           Vitals:    08/13/19 0437   BP: 143/92   Pulse: 69   Patient Position - Orthostatic VS: Sitting         Visual Acuity      ED Medications  Medications   ketorolac (TORADOL) injection 30 mg (has no administration in time range)   cyclobenzaprine (FLEXERIL) tablet 10 mg (has no administration in time range)       Diagnostic Studies  Results Reviewed     None                 No orders to display              Procedures  Procedures       ED Course                               MDM  Number of Diagnoses or Management Options  Diagnosis management comments: DDx including but not limited to: sciatica, herniated disc, arthritis, spinal stenosis, strain, sprain; doubt fracture, cauda equina syndrome, epidural abscess, AAA  Amount and/or Complexity of Data Reviewed  Decide to obtain previous medical records or to obtain history from someone other than the patient: yes  Review and summarize past medical records: yes        Disposition  Final diagnoses:   Acute bilateral low back pain without sciatica     Time reflects when diagnosis was documented in both MDM as applicable and the Disposition within this note     Time User Action Codes Description Comment    8/13/2019  4:52 AM Loretta Armendariz Add [M54 5] Acute bilateral low back pain without sciatica       ED Disposition     ED Disposition Condition Date/Time Comment    Discharge Stable Tue Aug 13, 2019  4:52 AM Hiren Shields discharge to home/self care              Follow-up Information     Follow up With Specialties Details Why Contact Info Additional [facility] Call in 1 day Return sooner if increased pain, numbness, weakness, fever, rash, vomiting, difficulty urinating or incontinence  No driving with flexeril  No heavy lifting  [address], [phone]          Patient's Medications   Discharge Prescriptions    CYCLOBENZAPRINE (FLEXERIL) 10 MG TABLET    Take 1 tablet (10 mg total) by mouth 3 (three) times a day as needed for muscle spasms for up to 7 days       Start Date: 8/13/2019 End Date: 8/20/2019       Order Dose: 10 mg       Quantity: 20 tablet    Refills: 0    NAPROXEN SODIUM (ANAPROX) 550 MG TABLET    Take 1 tablet (550 mg total) by mouth 2 (two) times a day with meals for 10 days       Start Date: 8/13/2019 End Date: 8/23/2019       Order Dose: 550 mg       Quantity: 20 tablet    Refills: 0     No discharge procedures on file      ED Provider  Electronically Signed by           Eb Ashby MD  08/13/19 5145

## 2020-07-24 ENCOUNTER — TELEPHONE (OUTPATIENT)
Dept: FAMILY MEDICINE CLINIC | Facility: CLINIC | Age: 40
End: 2020-07-24

## 2020-07-24 NOTE — TELEPHONE ENCOUNTER
Patient has been having diarrhea since Wednesday night/ no fever  Wants to know what he can do? Can we give him anything?     Tried imodium but not helping

## 2020-07-24 NOTE — TELEPHONE ENCOUNTER
likely  A stomach virus  Seems to be improving  Cont otc immodium start BHAVANI diet no milk products 3 days increase fluids if continues for more than 1 week total follow up with dr Rachel Tovar

## 2020-07-24 NOTE — TELEPHONE ENCOUNTER
Patient has had diarrhea since late Wednesday  Yesterday he went about 4 times today only once but it is very watery  Patient has had no fever  He has had stomach pains but only right before he has to go

## 2020-07-26 ENCOUNTER — APPOINTMENT (EMERGENCY)
Dept: CT IMAGING | Facility: HOSPITAL | Age: 40
End: 2020-07-26
Payer: COMMERCIAL

## 2020-07-26 ENCOUNTER — HOSPITAL ENCOUNTER (EMERGENCY)
Facility: HOSPITAL | Age: 40
Discharge: HOME | End: 2020-07-26
Attending: EMERGENCY MEDICINE | Admitting: EMERGENCY MEDICINE
Payer: COMMERCIAL

## 2020-07-26 VITALS
DIASTOLIC BLOOD PRESSURE: 91 MMHG | HEIGHT: 68 IN | BODY MASS INDEX: 29.54 KG/M2 | WEIGHT: 194.89 LBS | TEMPERATURE: 98.6 F | SYSTOLIC BLOOD PRESSURE: 138 MMHG | OXYGEN SATURATION: 95 % | RESPIRATION RATE: 18 BRPM | HEART RATE: 94 BPM

## 2020-07-26 DIAGNOSIS — N20.1 LEFT URETERAL STONE: Primary | ICD-10-CM

## 2020-07-26 LAB
BACTERIA UR QL AUTO: ABNORMAL /HPF
BILIRUB UR QL STRIP: NEGATIVE
CLARITY UR: CLEAR
COLOR UR: YELLOW
GLUCOSE UR STRIP-MCNC: NEGATIVE MG/DL
HGB UR QL STRIP.AUTO: ABNORMAL
KETONES UR STRIP-MCNC: NEGATIVE MG/DL
LEUKOCYTE ESTERASE UR QL STRIP: NEGATIVE
MUCOUS THREADS UR QL AUTO: ABNORMAL
NITRITE UR QL STRIP: NEGATIVE
NON-SQ EPI CELLS URNS QL MICRO: ABNORMAL /HPF
PH UR STRIP.AUTO: 5.5 [PH] (ref 4.5–8)
PROT UR STRIP-MCNC: NEGATIVE MG/DL
RBC #/AREA URNS AUTO: ABNORMAL /HPF
SP GR UR STRIP.AUTO: >=1.03 (ref 1–1.03)
UROBILINOGEN UR QL STRIP.AUTO: 0.2 E.U./DL
WBC #/AREA URNS AUTO: ABNORMAL /HPF

## 2020-07-26 PROCEDURE — 96372 THER/PROPH/DIAG INJ SC/IM: CPT

## 2020-07-26 PROCEDURE — 81001 URINALYSIS AUTO W/SCOPE: CPT

## 2020-07-26 PROCEDURE — 74176 CT ABD & PELVIS W/O CONTRAST: CPT

## 2020-07-26 PROCEDURE — 99284 EMERGENCY DEPT VISIT MOD MDM: CPT

## 2020-07-26 PROCEDURE — 99284 EMERGENCY DEPT VISIT MOD MDM: CPT | Performed by: EMERGENCY MEDICINE

## 2020-07-26 RX ORDER — KETOROLAC TROMETHAMINE 30 MG/ML
30 INJECTION, SOLUTION INTRAMUSCULAR; INTRAVENOUS ONCE
Status: COMPLETED | OUTPATIENT
Start: 2020-07-26 | End: 2020-07-26

## 2020-07-26 RX ADMIN — KETOROLAC TROMETHAMINE 30 MG: 30 INJECTION, SOLUTION INTRAMUSCULAR at 17:24

## 2020-07-26 NOTE — ED PROVIDER NOTES
History  Chief Complaint   Patient presents with    Testicle Pain     C/o left testicular pain starting yesterday  Denies visual abnormalities  Pt denies  complaints  History provided by:  Patient   used: No    78-year-old male presented for evaluation of left intermittent testicular pain beginning yesterday  Pain moderate, some radiation to the groin  No urinary complaints, fever, chills  He denies noticing any erythema or edema of the scrotum  No hernia  On exam he has no testicular tenderness  There are no scrotal abnormalities  He does have history of ureteral stones  Will plan pain control, CT to rule out ureteral stone  UA rule UTI, hematuria  Unlikely diverticulitis or hernia  Prior to Admission Medications   Prescriptions Last Dose Informant Patient Reported? Taking? PARoxetine (PAXIL) 10 mg tablet Not Taking at Unknown time  Yes No   Sig: Take 10 mg by mouth every morning  cyclobenzaprine (FLEXERIL) 10 mg tablet   No No   Sig: Take 1 tablet (10 mg total) by mouth 3 (three) times a day as needed for muscle spasms for up to 7 days   dicyclomine (BENTYL) 10 mg capsule Not Taking at Unknown time  No No   Sig: Take 1 capsule (10 mg total) by mouth 3 (three) times a day before meals   Patient not taking: Reported on 7/26/2020   naproxen sodium (ANAPROX) 550 mg tablet   No No   Sig: Take 1 tablet (550 mg total) by mouth 2 (two) times a day with meals for 10 days   omeprazole (PriLOSEC) 10 mg delayed release capsule Not Taking at Unknown time Self Yes No   Sig: Take 10 mg by mouth daily      Facility-Administered Medications: None       Past Medical History:   Diagnosis Date    Psychiatric disorder     depression    Renal disorder     stones       Past Surgical History:   Procedure Laterality Date    IA COLONOSCOPY FLX DX W/COLLJ SPEC WHEN PFRMD N/A 6/11/2018    Procedure: EGD AND COLONOSCOPY;  Surgeon: Ellie Price MD;  [facility];   Service: Gastroenterology    CT FRAGMENT KIDNEY STONE/ ESWL Left 11/4/2016    Procedure: Valdo Cox SHOCKWAVE (ESWL); Surgeon: Wilma Vasquez MD;  [facility];  Service: Urology    TOOTH EXTRACTION         Family History   Problem Relation Age of Onset    Migraines Mother     Crohn's disease Father     Liver disease Brother     Tourette syndrome Brother     Diabetes Paternal Grandmother      I have reviewed and agree with the history as documented  E-Cigarette/Vaping     E-Cigarette/Vaping Substances     Social History     Tobacco Use    Smoking status: Light Tobacco Smoker     Last attempt to quit: 10/4/2016     Years since quitting: 3 8    Smokeless tobacco: Never Used    Tobacco comment: 1 cig daily, trying to quit 4/2018  Substance Use Topics    Alcohol use: Yes     Comment: socially    Drug use: No       Review of Systems   Constitutional: Negative for activity change, appetite change and fever  Respiratory: Negative for chest tightness and shortness of breath  Gastrointestinal: Negative for abdominal pain, nausea and vomiting  Genitourinary: Positive for testicular pain  Negative for difficulty urinating, dysuria and flank pain  Musculoskeletal: Negative for back pain and neck pain  All other systems reviewed and are negative  Physical Exam  Physical Exam   Constitutional: He is oriented to person, place, and time  He appears well-developed and well-nourished  No distress  Cardiovascular: Normal rate and regular rhythm  Pulmonary/Chest: Effort normal  No respiratory distress  Abdominal: Soft  He exhibits no distension  There is no tenderness  Genitourinary:   Genitourinary Comments: No scrotal edema or erythema  No testicular tenderness  No inguinal lymphadenopathy or tenderness  No palpable hernia  Neurological: He is alert and oriented to person, place, and time  Skin: Skin is warm and dry  Psychiatric: He has a normal mood and affect   His behavior is normal    Nursing note and vitals reviewed  Vital Signs  ED Triage Vitals [07/26/20 1614]   Temperature Pulse Respirations Blood Pressure SpO2   98 6 °F (37 °C) (!) 110 18 138/91 97 %      Temp Source Heart Rate Source Patient Position - Orthostatic VS BP Location FiO2 (%)   Oral Monitor Sitting Right arm --      Pain Score       6           Vitals:    07/26/20 1614 07/26/20 1615 07/26/20 1730   BP: 138/91 138/91    Pulse: (!) 110 (!) 106 94   Patient Position - Orthostatic VS: Sitting           Visual Acuity      ED Medications  Medications   ketorolac (TORADOL) injection 30 mg (30 mg Intramuscular Given 7/26/20 1724)       Diagnostic Studies  Results Reviewed     Procedure Component Value Units Date/Time    Urine Microscopic [[id number]]  (Abnormal) Collected:  07/26/20 1700    Lab Status:  Final result Specimen:  Urine, Clean Catch Updated:  07/26/20 1720     RBC, UA 10-20 /hpf      WBC, UA 4-10 /hpf      Epithelial Cells Occasional /hpf      Bacteria, UA Occasional /hpf      MUCUS THREADS Occasional    Urine Macroscopic, POC [[id number]]  (Abnormal) Collected:  07/26/20 1700    Lab Status:  Final result Specimen:  Urine Updated:  07/26/20 1644     Color, UA Yellow     Clarity, UA Clear     pH, UA 5 5     Leukocytes, UA Negative     Nitrite, UA Negative     Protein, UA Negative mg/dl      Glucose, UA Negative mg/dl      Ketones, UA Negative mg/dl      Urobilinogen, UA 0 2 E U /dl      Bilirubin, UA Negative     Blood, UA Moderate     Specific Gravity, UA >=1 030    Narrative:       CLINITEK RESULT                 CT renal stone study abdomen pelvis without contrast   Final Result by Earnest Echevarria MD (07/26 1719)      1  4 mm distal left ureteral calculus noted, causing only very mild upstream hydronephrosis  Additional calculus noted in the left kidney upper pole  2  Diffuse hepatic steatosis   3  Partial visualization of fluid density structure along the right cardiac margin   Nonemergent further characterization of this finding with chest MRI or contrast-enhanced chest CT is recommended  The examination demonstrates a finding requiring imaging follow-up and was logged as such in [facility]  Workstation performed: [id number]                    Procedures  Procedures         ED Course       US AUDIT      Most Recent Value   Initial Alcohol Screen: US AUDIT-C    1  How often do you have a drink containing alcohol?  0 Filed at: 07/26/2020 1614   2  How many drinks containing alcohol do you have on a typical day you are drinking? 0 Filed at: 07/26/2020 1614   3a  Male UNDER 65: How often do you have five or more drinks on one occasion? 0 Filed at: 07/26/2020 1614   3b  FEMALE Any Age, or MALE 65+: How often do you have 4 or more drinks on one occassion? 0 Filed at: 07/26/2020 1614   Audit-C Score  0 Filed at: 07/26/2020 1614                  BALDEV/DAST-10      Most Recent Value   How many times in the past year have you    Used an illegal drug or used a prescription medication for non-medical reasons? Never Filed at: 07/26/2020 1614                                MDM  Number of Diagnoses or Management Options  Left ureteral stone: new and requires workup  Diagnosis management comments: 59-year-old male presented for evaluation of left testicular pain  Ultimately found to have 4 mm distal ureteral stone with mild hydronephrosis  Some blood in the urine without sign of infection  Referred to Urology for follow-up  OTC meds currently controlling pain  Advised increased hydration  Return to ED with any unbearable symptoms         Amount and/or Complexity of Data Reviewed  Clinical lab tests: ordered and reviewed  Tests in the radiology section of CPT®: reviewed and ordered    Patient Progress  Patient progress: improved        Disposition  Final diagnoses:   Left ureteral stone     Time reflects when diagnosis was documented in both MDM as applicable and the Disposition within this note Time User Action Codes Description Comment    7/26/2020  5:35 PM Sarina Alexandre Add [N20 1] Left ureteral stone       ED Disposition     ED Disposition Condition Date/Time Comment    Discharge Stable Sun Jul 26, 2020  5:35 PM Rena Palma Duane discharge to home/self care  Follow-up Information     Follow up With Specialties Details Why Contact Info Additional [address] [facility]  As needed [address] [phone]  [address] [facility], [address], [place], [address] [facility]          Discharge Medication List as of 7/26/2020  5:35 PM      CONTINUE these medications which have NOT CHANGED    Details   cyclobenzaprine (FLEXERIL) 10 mg tablet Take 1 tablet (10 mg total) by mouth 3 (three) times a day as needed for muscle spasms for up to 7 days, Starting Tue 8/13/2019, Until Tue 8/20/2019, Print      dicyclomine (BENTYL) 10 mg capsule Take 1 capsule (10 mg total) by mouth 3 (three) times a day before meals, Starting Tue 4/24/2018, Normal      naproxen sodium (ANAPROX) 550 mg tablet Take 1 tablet (550 mg total) by mouth 2 (two) times a day with meals for 10 days, Starting Tue 8/13/2019, Until Fri 8/23/2019, Print      omeprazole (PriLOSEC) 10 mg delayed release capsule Take 10 mg by mouth daily, Historical Med      PARoxetine (PAXIL) 10 mg tablet Take 10 mg by mouth every morning , Until Discontinued, Historical Med           No discharge procedures on file      PDMP Review     None          ED Provider  Electronically Signed by           Garfield Howard MD  07/27/20 0104

## 2020-07-27 ENCOUNTER — TELEPHONE (OUTPATIENT)
Dept: FAMILY MEDICINE CLINIC | Facility: CLINIC | Age: 40
End: 2020-07-27

## 2020-07-27 DIAGNOSIS — R93.89 ABNORMAL MRI: Primary | ICD-10-CM

## 2020-07-27 NOTE — TELEPHONE ENCOUNTER
Patient went to the ER yesterday  He was dx with kidney stone  They also told him that he has a possible cyst on his chest  They told him to call his PCP so you could order a CT or MRI for him

## 2020-07-27 NOTE — TELEPHONE ENCOUNTER
Call pt  I reviewed ER report  Ok to order CT chest with contrast to evaluate density along cardiac margin

## 2020-07-31 DIAGNOSIS — F32.A CHRONIC DEPRESSION: Primary | ICD-10-CM

## 2020-07-31 DIAGNOSIS — K21.9 GASTROESOPHAGEAL REFLUX DISEASE, ESOPHAGITIS PRESENCE NOT SPECIFIED: ICD-10-CM

## 2020-07-31 PROBLEM — R19.7 DIARRHEA: Status: RESOLVED | Noted: 2018-04-24 | Resolved: 2020-07-31

## 2020-07-31 RX ORDER — OMEPRAZOLE 40 MG/1
CAPSULE, DELAYED RELEASE ORAL
Qty: 90 CAPSULE | Refills: 2 | Status: SHIPPED | OUTPATIENT
Start: 2020-07-31 | End: 2021-08-17

## 2020-07-31 RX ORDER — PAROXETINE 10 MG/1
TABLET, FILM COATED ORAL
Qty: 90 TABLET | Refills: 2 | Status: SHIPPED | OUTPATIENT
Start: 2020-07-31 | End: 2021-08-01

## 2020-08-06 ENCOUNTER — HOSPITAL ENCOUNTER (OUTPATIENT)
Dept: CT IMAGING | Facility: HOSPITAL | Age: 40
Discharge: HOME | End: 2020-08-06
Attending: FAMILY MEDICINE
Payer: COMMERCIAL

## 2020-08-06 DIAGNOSIS — R93.89 ABNORMAL MRI: ICD-10-CM

## 2020-08-06 PROCEDURE — 71260 CT THORAX DX C+: CPT

## 2020-08-06 RX ADMIN — IOHEXOL 85 ML: 350 INJECTION, SOLUTION INTRAVENOUS at 22:19

## 2020-09-09 ENCOUNTER — OFFICE VISIT (OUTPATIENT)
Dept: FAMILY MEDICINE CLINIC | Facility: CLINIC | Age: 40
End: 2020-09-09
Payer: COMMERCIAL

## 2020-09-09 VITALS
RESPIRATION RATE: 16 BRPM | OXYGEN SATURATION: 98 % | SYSTOLIC BLOOD PRESSURE: 102 MMHG | WEIGHT: 198 LBS | HEART RATE: 80 BPM | HEIGHT: 70 IN | BODY MASS INDEX: 28.35 KG/M2 | DIASTOLIC BLOOD PRESSURE: 70 MMHG | TEMPERATURE: 97.4 F

## 2020-09-09 DIAGNOSIS — R35.0 FREQUENT URINATION: Primary | ICD-10-CM

## 2020-09-09 LAB
SL AMB  POCT GLUCOSE, UA: NEGATIVE
SL AMB LEUKOCYTE ESTERASE,UA: ABNORMAL
SL AMB POCT BILIRUBIN,UA: NEGATIVE
SL AMB POCT BLOOD,UA: ABNORMAL
SL AMB POCT CLARITY,UA: CLEAR
SL AMB POCT COLOR,UA: YELLOW
SL AMB POCT KETONES,UA: NEGATIVE
SL AMB POCT NITRITE,UA: NEGATIVE
SL AMB POCT PH,UA: 6
SL AMB POCT SPECIFIC GRAVITY,UA: 1.01
SL AMB POCT URINE PROTEIN: ABNORMAL
SL AMB POCT UROBILINOGEN: NORMAL

## 2020-09-09 PROCEDURE — 81002 URINALYSIS NONAUTO W/O SCOPE: CPT | Performed by: FAMILY MEDICINE

## 2020-09-09 PROCEDURE — 99213 OFFICE O/P EST LOW 20 MIN: CPT | Performed by: FAMILY MEDICINE

## 2020-09-09 RX ORDER — DOXYCYCLINE HYCLATE 100 MG/1
100 CAPSULE ORAL EVERY 12 HOURS SCHEDULED
Qty: 14 CAPSULE | Refills: 0 | Status: SHIPPED | OUTPATIENT
Start: 2020-09-09 | End: 2020-09-16

## 2020-09-09 NOTE — PROGRESS NOTES
BMI Counseling: Body mass index is 28 41 kg/m²  The BMI is above normal  Nutrition recommendations include decreasing portion sizes, encouraging healthy choices of fruits and vegetables, consuming healthier snacks and moderation in carbohydrate intake  Exercise recommendations include exercising 3-5 times per week  No pharmacotherapy was ordered  Assessment/Plan:         Problem List Items Addressed This Visit        Other    Frequent urination - Primary     Urine dip done and pt has trace rbcs and trace wbcs  Pt likely has mild UTI and urethritis  Will start abxs for 7 days  Pt to call if sxs persist or worsen  Relevant Medications    doxycycline hyclate (VIBRAMYCIN) 100 mg capsule    Other Relevant Orders    POCT urine dip (Completed)            Subjective:      Patient ID: Erich Lao is a 46 y o  male  Pt here for frequent urination and discomfort in groin  Has history of 4 mm L kidney stone in July 2020  Sxs got better and started having sxs again last week  No fever or chills  No back or abd pain  The following portions of the patient's history were reviewed and updated as appropriate:   He has a past medical history of Psychiatric disorder and Renal disorder  ,  does not have any pertinent problems on file  ,   has a past surgical history that includes Tooth extraction; pr fragment kidney stone/ eswl (Left, 11/4/2016); and pr colonoscopy flx dx w/collj spec when pfrmd (N/A, 6/11/2018)  ,  family history includes Crohn's disease in his father; Diabetes in his paternal grandmother; Liver disease in his brother; Migraines in his mother; Tourette syndrome in his brother  ,   reports that he has been smoking cigarettes  He has been smoking about 0 25 packs per day  He has never used smokeless tobacco  He reports current alcohol use  He reports that he does not use drugs  ,  has No Known Allergies     Current Outpatient Medications   Medication Sig Dispense Refill    omeprazole (PriLOSEC) 40 MG capsule TAKE 1 CAPSULE BY MOUTH EVERY DAY 90 capsule 2    PARoxetine (PAXIL) 10 mg tablet TAKE 1 TABLET BY MOUTH EVERY DAY 90 tablet 2    cyclobenzaprine (FLEXERIL) 10 mg tablet Take 1 tablet (10 mg total) by mouth 3 (three) times a day as needed for muscle spasms for up to 7 days 20 tablet 0    doxycycline hyclate (VIBRAMYCIN) 100 mg capsule Take 1 capsule (100 mg total) by mouth every 12 (twelve) hours for 7 days 14 capsule 0    naproxen sodium (ANAPROX) 550 mg tablet Take 1 tablet (550 mg total) by mouth 2 (two) times a day with meals for 10 days 20 tablet 0     No current facility-administered medications for this visit  Review of Systems   Constitutional: Negative for fatigue and unexpected weight change  Respiratory: Negative for cough and shortness of breath  Cardiovascular: Negative for chest pain  Gastrointestinal: Negative for abdominal pain, constipation, diarrhea and vomiting  Genitourinary: Positive for dysuria, frequency and urgency  Musculoskeletal: Negative for arthralgias and back pain  Neurological: Negative for dizziness and headaches  Psychiatric/Behavioral: Negative for dysphoric mood  The patient is not nervous/anxious  Objective:  Vitals:    09/09/20 1012   BP: 102/70   BP Location: Left arm   Patient Position: Sitting   Cuff Size: Adult   Pulse: 80   Resp: 16   Temp: (!) 97 4 °F (36 3 °C)   SpO2: 98%   Weight: 89 8 kg (198 lb)   Height: 5' 10" (1 778 m)     Body mass index is 28 41 kg/m²  Physical Exam  Vitals signs and nursing note reviewed  Constitutional:       Appearance: He is well-developed  Neck:      Musculoskeletal: Normal range of motion and neck supple  Thyroid: No thyromegaly  Cardiovascular:      Rate and Rhythm: Normal rate and regular rhythm  Heart sounds: Normal heart sounds  No murmur  Pulmonary:      Effort: Pulmonary effort is normal  No respiratory distress  Breath sounds: Normal breath sounds  No wheezing  Abdominal:      General: Abdomen is flat  Bowel sounds are normal  There is no distension  Palpations: Abdomen is soft  Tenderness: There is no abdominal tenderness  There is no right CVA tenderness or left CVA tenderness  Lymphadenopathy:      Cervical: No cervical adenopathy  Neurological:      Mental Status: He is alert and oriented to person, place, and time  Cranial Nerves: No cranial nerve deficit  Psychiatric:         Behavior: Behavior normal          Thought Content:  Thought content normal          Judgment: Judgment normal

## 2020-09-09 NOTE — ASSESSMENT & PLAN NOTE
Urine dip done and pt has trace rbcs and trace wbcs  Pt likely has mild UTI and urethritis  Will start abxs for 7 days  Pt to call if sxs persist or worsen

## 2020-10-02 ENCOUNTER — TELEPHONE (OUTPATIENT)
Dept: UROLOGY | Facility: MEDICAL CENTER | Age: 40
End: 2020-10-02

## 2020-10-02 ENCOUNTER — HOSPITAL ENCOUNTER (EMERGENCY)
Facility: HOSPITAL | Age: 40
Discharge: HOME | End: 2020-10-02
Attending: EMERGENCY MEDICINE | Admitting: EMERGENCY MEDICINE
Payer: COMMERCIAL

## 2020-10-02 VITALS
OXYGEN SATURATION: 97 % | SYSTOLIC BLOOD PRESSURE: 133 MMHG | TEMPERATURE: 98.3 F | DIASTOLIC BLOOD PRESSURE: 67 MMHG | WEIGHT: 196.21 LBS | BODY MASS INDEX: 28.09 KG/M2 | HEIGHT: 70 IN | RESPIRATION RATE: 18 BRPM | HEART RATE: 76 BPM

## 2020-10-02 DIAGNOSIS — R31.9 HEMATURIA: Primary | ICD-10-CM

## 2020-10-02 LAB
ALBUMIN SERPL BCP-MCNC: 4 G/DL (ref 3.5–5)
ALP SERPL-CCNC: 54 U/L (ref 46–116)
ALT SERPL W P-5'-P-CCNC: 155 U/L (ref 12–78)
ANION GAP SERPL CALCULATED.3IONS-SCNC: 8 MMOL/L (ref 4–13)
AST SERPL W P-5'-P-CCNC: 65 U/L (ref 5–45)
BACTERIA UR QL AUTO: ABNORMAL /HPF
BASOPHILS # BLD AUTO: 0.05 THOUSANDS/ΜL (ref 0–0.1)
BASOPHILS NFR BLD AUTO: 1 % (ref 0–1)
BILIRUB SERPL-MCNC: 0.54 MG/DL (ref 0.2–1)
BILIRUB UR QL STRIP: NEGATIVE
BUN SERPL-MCNC: 20 MG/DL (ref 5–25)
CALCIUM SERPL-MCNC: 8.7 MG/DL (ref 8.3–10.1)
CHLORIDE SERPL-SCNC: 106 MMOL/L (ref 100–108)
CLARITY UR: CLEAR
CO2 SERPL-SCNC: 25 MMOL/L (ref 21–32)
COLOR UR: YELLOW
CREAT SERPL-MCNC: 1.13 MG/DL (ref 0.6–1.3)
EOSINOPHIL # BLD AUTO: 0.17 THOUSAND/ΜL (ref 0–0.61)
EOSINOPHIL NFR BLD AUTO: 3 % (ref 0–6)
ERYTHROCYTE [DISTWIDTH] IN BLOOD BY AUTOMATED COUNT: 12.5 % (ref 11.6–15.1)
GFR SERPL CREATININE-BSD FRML MDRD: 81 ML/MIN/1.73SQ M
GLUCOSE SERPL-MCNC: 100 MG/DL (ref 65–140)
GLUCOSE UR STRIP-MCNC: NEGATIVE MG/DL
HCT VFR BLD AUTO: 44.1 % (ref 36.5–49.3)
HGB BLD-MCNC: 14.8 G/DL (ref 12–17)
HGB UR QL STRIP.AUTO: ABNORMAL
IMM GRANULOCYTES # BLD AUTO: 0.01 THOUSAND/UL (ref 0–0.2)
IMM GRANULOCYTES NFR BLD AUTO: 0 % (ref 0–2)
KETONES UR STRIP-MCNC: NEGATIVE MG/DL
LEUKOCYTE ESTERASE UR QL STRIP: NEGATIVE
LYMPHOCYTES # BLD AUTO: 2.48 THOUSANDS/ΜL (ref 0.6–4.47)
LYMPHOCYTES NFR BLD AUTO: 48 % (ref 14–44)
MCH RBC QN AUTO: 30.1 PG (ref 26.8–34.3)
MCHC RBC AUTO-ENTMCNC: 33.6 G/DL (ref 31.4–37.4)
MCV RBC AUTO: 90 FL (ref 82–98)
MONOCYTES # BLD AUTO: 0.43 THOUSAND/ΜL (ref 0.17–1.22)
MONOCYTES NFR BLD AUTO: 8 % (ref 4–12)
MUCOUS THREADS UR QL AUTO: ABNORMAL
NEUTROPHILS # BLD AUTO: 2.08 THOUSANDS/ΜL (ref 1.85–7.62)
NEUTS SEG NFR BLD AUTO: 40 % (ref 43–75)
NITRITE UR QL STRIP: NEGATIVE
NON-SQ EPI CELLS URNS QL MICRO: ABNORMAL /HPF
NRBC BLD AUTO-RTO: 0 /100 WBCS
PH UR STRIP.AUTO: 5.5 [PH]
PLATELET # BLD AUTO: 268 THOUSANDS/UL (ref 149–390)
PMV BLD AUTO: 9.5 FL (ref 8.9–12.7)
POTASSIUM SERPL-SCNC: 4.2 MMOL/L (ref 3.5–5.3)
PROT SERPL-MCNC: 7 G/DL (ref 6.4–8.2)
PROT UR STRIP-MCNC: NEGATIVE MG/DL
RBC # BLD AUTO: 4.92 MILLION/UL (ref 3.88–5.62)
RBC #/AREA URNS AUTO: ABNORMAL /HPF
SODIUM SERPL-SCNC: 139 MMOL/L (ref 136–145)
SP GR UR STRIP.AUTO: >=1.03 (ref 1–1.03)
UROBILINOGEN UR QL STRIP.AUTO: 0.2 E.U./DL
WBC # BLD AUTO: 5.22 THOUSAND/UL (ref 4.31–10.16)
WBC #/AREA URNS AUTO: ABNORMAL /HPF

## 2020-10-02 PROCEDURE — 99284 EMERGENCY DEPT VISIT MOD MDM: CPT | Performed by: PHYSICIAN ASSISTANT

## 2020-10-02 PROCEDURE — 81001 URINALYSIS AUTO W/SCOPE: CPT | Performed by: PHYSICIAN ASSISTANT

## 2020-10-02 PROCEDURE — 87491 CHLMYD TRACH DNA AMP PROBE: CPT | Performed by: PHYSICIAN ASSISTANT

## 2020-10-02 PROCEDURE — 96374 THER/PROPH/DIAG INJ IV PUSH: CPT

## 2020-10-02 PROCEDURE — 99283 EMERGENCY DEPT VISIT LOW MDM: CPT

## 2020-10-02 PROCEDURE — 85025 COMPLETE CBC W/AUTO DIFF WBC: CPT | Performed by: PHYSICIAN ASSISTANT

## 2020-10-02 PROCEDURE — 87591 N.GONORRHOEAE DNA AMP PROB: CPT | Performed by: PHYSICIAN ASSISTANT

## 2020-10-02 PROCEDURE — 80053 COMPREHEN METABOLIC PANEL: CPT | Performed by: PHYSICIAN ASSISTANT

## 2020-10-02 PROCEDURE — 36415 COLL VENOUS BLD VENIPUNCTURE: CPT | Performed by: PHYSICIAN ASSISTANT

## 2020-10-02 RX ORDER — KETOROLAC TROMETHAMINE 30 MG/ML
15 INJECTION, SOLUTION INTRAMUSCULAR; INTRAVENOUS ONCE
Status: COMPLETED | OUTPATIENT
Start: 2020-10-02 | End: 2020-10-02

## 2020-10-02 RX ADMIN — KETOROLAC TROMETHAMINE 15 MG: 30 INJECTION, SOLUTION INTRAMUSCULAR at 01:53

## 2020-10-03 LAB
C TRACH DNA SPEC QL NAA+PROBE: NEGATIVE
N GONORRHOEA DNA SPEC QL NAA+PROBE: NEGATIVE

## 2020-10-05 ENCOUNTER — OFFICE VISIT (OUTPATIENT)
Dept: UROLOGY | Facility: AMBULATORY SURGERY CENTER | Age: 40
End: 2020-10-05
Payer: COMMERCIAL

## 2020-10-05 VITALS
DIASTOLIC BLOOD PRESSURE: 64 MMHG | SYSTOLIC BLOOD PRESSURE: 112 MMHG | TEMPERATURE: 97.3 F | WEIGHT: 196 LBS | HEART RATE: 92 BPM | HEIGHT: 70 IN | BODY MASS INDEX: 28.06 KG/M2

## 2020-10-05 DIAGNOSIS — N20.0 NEPHROLITHIASIS: ICD-10-CM

## 2020-10-05 DIAGNOSIS — R10.9 FLANK PAIN: ICD-10-CM

## 2020-10-05 DIAGNOSIS — R10.30 INGUINAL PAIN, UNSPECIFIED LATERALITY: Primary | ICD-10-CM

## 2020-10-05 LAB
BACTERIA UR QL AUTO: ABNORMAL /HPF
BILIRUB UR QL STRIP: NEGATIVE
CAOX CRY URNS QL MICRO: ABNORMAL /HPF
CLARITY UR: CLEAR
COLOR UR: YELLOW
GLUCOSE UR STRIP-MCNC: NEGATIVE MG/DL
HGB UR QL STRIP.AUTO: ABNORMAL
KETONES UR STRIP-MCNC: NEGATIVE MG/DL
LEUKOCYTE ESTERASE UR QL STRIP: NEGATIVE
MUCOUS THREADS UR QL AUTO: ABNORMAL
NITRITE UR QL STRIP: NEGATIVE
NON-SQ EPI CELLS URNS QL MICRO: ABNORMAL /HPF
PH UR STRIP.AUTO: 6 [PH]
PROT UR STRIP-MCNC: ABNORMAL MG/DL
RBC #/AREA URNS AUTO: ABNORMAL /HPF
SL AMB  POCT GLUCOSE, UA: ABNORMAL
SL AMB LEUKOCYTE ESTERASE,UA: ABNORMAL
SL AMB POCT BILIRUBIN,UA: ABNORMAL
SL AMB POCT BLOOD,UA: + 3
SL AMB POCT CLARITY,UA: CLEAR
SL AMB POCT COLOR,UA: YELLOW
SL AMB POCT KETONES,UA: ABNORMAL
SL AMB POCT NITRITE,UA: ABNORMAL
SL AMB POCT PH,UA: 6
SL AMB POCT SPECIFIC GRAVITY,UA: 1.03
SL AMB POCT URINE PROTEIN: ABNORMAL
SL AMB POCT UROBILINOGEN: ABNORMAL
SP GR UR STRIP.AUTO: 1.03 (ref 1–1.03)
UROBILINOGEN UR QL STRIP.AUTO: 0.2 E.U./DL
WBC #/AREA URNS AUTO: ABNORMAL /HPF

## 2020-10-05 PROCEDURE — 81002 URINALYSIS NONAUTO W/O SCOPE: CPT | Performed by: NURSE PRACTITIONER

## 2020-10-05 PROCEDURE — 87086 URINE CULTURE/COLONY COUNT: CPT | Performed by: NURSE PRACTITIONER

## 2020-10-05 PROCEDURE — 81001 URINALYSIS AUTO W/SCOPE: CPT | Performed by: NURSE PRACTITIONER

## 2020-10-05 PROCEDURE — 99244 OFF/OP CNSLTJ NEW/EST MOD 40: CPT | Performed by: NURSE PRACTITIONER

## 2020-10-05 RX ORDER — TAMSULOSIN HYDROCHLORIDE 0.4 MG/1
0.4 CAPSULE ORAL
Qty: 30 CAPSULE | Refills: 0 | Status: SHIPPED | OUTPATIENT
Start: 2020-10-05 | End: 2020-10-27

## 2020-10-06 LAB — BACTERIA UR CULT: NORMAL

## 2020-10-07 ENCOUNTER — HOSPITAL ENCOUNTER (EMERGENCY)
Facility: HOSPITAL | Age: 40
Discharge: HOME | End: 2020-10-07
Attending: EMERGENCY MEDICINE
Payer: COMMERCIAL

## 2020-10-07 ENCOUNTER — NURSE TRIAGE (OUTPATIENT)
Dept: OTHER | Facility: OTHER | Age: 40
End: 2020-10-07

## 2020-10-07 ENCOUNTER — APPOINTMENT (EMERGENCY)
Dept: RADIOLOGY | Facility: HOSPITAL | Age: 40
End: 2020-10-07
Payer: COMMERCIAL

## 2020-10-07 VITALS
DIASTOLIC BLOOD PRESSURE: 85 MMHG | RESPIRATION RATE: 18 BRPM | HEART RATE: 82 BPM | OXYGEN SATURATION: 94 % | TEMPERATURE: 99 F | SYSTOLIC BLOOD PRESSURE: 141 MMHG

## 2020-10-07 DIAGNOSIS — R00.2 PALPITATIONS: Primary | ICD-10-CM

## 2020-10-07 LAB
ALBUMIN SERPL BCP-MCNC: 4.1 G/DL (ref 3.5–5)
ALP SERPL-CCNC: 53 U/L (ref 46–116)
ALT SERPL W P-5'-P-CCNC: 166 U/L (ref 12–78)
ANION GAP SERPL CALCULATED.3IONS-SCNC: 6 MMOL/L (ref 4–13)
AST SERPL W P-5'-P-CCNC: 62 U/L (ref 5–45)
BASOPHILS # BLD AUTO: 0.05 THOUSANDS/ΜL (ref 0–0.1)
BASOPHILS NFR BLD AUTO: 1 % (ref 0–1)
BILIRUB SERPL-MCNC: 0.46 MG/DL (ref 0.2–1)
BUN SERPL-MCNC: 13 MG/DL (ref 5–25)
CALCIUM SERPL-MCNC: 9.4 MG/DL (ref 8.3–10.1)
CHLORIDE SERPL-SCNC: 100 MMOL/L (ref 100–108)
CO2 SERPL-SCNC: 27 MMOL/L (ref 21–32)
CREAT SERPL-MCNC: 1.06 MG/DL (ref 0.6–1.3)
EOSINOPHIL # BLD AUTO: 0.16 THOUSAND/ΜL (ref 0–0.61)
EOSINOPHIL NFR BLD AUTO: 3 % (ref 0–6)
ERYTHROCYTE [DISTWIDTH] IN BLOOD BY AUTOMATED COUNT: 12.3 % (ref 11.6–15.1)
GFR SERPL CREATININE-BSD FRML MDRD: 88 ML/MIN/1.73SQ M
GLUCOSE SERPL-MCNC: 95 MG/DL (ref 65–140)
HCT VFR BLD AUTO: 46.2 % (ref 36.5–49.3)
HGB BLD-MCNC: 15.7 G/DL (ref 12–17)
IMM GRANULOCYTES # BLD AUTO: 0.02 THOUSAND/UL (ref 0–0.2)
IMM GRANULOCYTES NFR BLD AUTO: 0 % (ref 0–2)
LYMPHOCYTES # BLD AUTO: 2.53 THOUSANDS/ΜL (ref 0.6–4.47)
LYMPHOCYTES NFR BLD AUTO: 44 % (ref 14–44)
MCH RBC QN AUTO: 30 PG (ref 26.8–34.3)
MCHC RBC AUTO-ENTMCNC: 34 G/DL (ref 31.4–37.4)
MCV RBC AUTO: 88 FL (ref 82–98)
MONOCYTES # BLD AUTO: 0.44 THOUSAND/ΜL (ref 0.17–1.22)
MONOCYTES NFR BLD AUTO: 8 % (ref 4–12)
NEUTROPHILS # BLD AUTO: 2.59 THOUSANDS/ΜL (ref 1.85–7.62)
NEUTS SEG NFR BLD AUTO: 44 % (ref 43–75)
NRBC BLD AUTO-RTO: 0 /100 WBCS
PLATELET # BLD AUTO: 284 THOUSANDS/UL (ref 149–390)
PMV BLD AUTO: 9.6 FL (ref 8.9–12.7)
POTASSIUM SERPL-SCNC: 4.1 MMOL/L (ref 3.5–5.3)
PROT SERPL-MCNC: 7.5 G/DL (ref 6.4–8.2)
RBC # BLD AUTO: 5.23 MILLION/UL (ref 3.88–5.62)
SODIUM SERPL-SCNC: 133 MMOL/L (ref 136–145)
TROPONIN I SERPL-MCNC: <0.02 NG/ML
WBC # BLD AUTO: 5.79 THOUSAND/UL (ref 4.31–10.16)

## 2020-10-07 PROCEDURE — 84484 ASSAY OF TROPONIN QUANT: CPT | Performed by: EMERGENCY MEDICINE

## 2020-10-07 PROCEDURE — 80053 COMPREHEN METABOLIC PANEL: CPT | Performed by: EMERGENCY MEDICINE

## 2020-10-07 PROCEDURE — 85025 COMPLETE CBC W/AUTO DIFF WBC: CPT | Performed by: EMERGENCY MEDICINE

## 2020-10-07 PROCEDURE — 99285 EMERGENCY DEPT VISIT HI MDM: CPT | Performed by: EMERGENCY MEDICINE

## 2020-10-07 PROCEDURE — 36415 COLL VENOUS BLD VENIPUNCTURE: CPT

## 2020-10-07 PROCEDURE — 93005 ELECTROCARDIOGRAM TRACING: CPT

## 2020-10-07 PROCEDURE — 99285 EMERGENCY DEPT VISIT HI MDM: CPT

## 2020-10-07 PROCEDURE — 71045 X-RAY EXAM CHEST 1 VIEW: CPT

## 2020-10-08 LAB
ATRIAL RATE: 80 BPM
P AXIS: 73 DEGREES
PR INTERVAL: 156 MS
QRS AXIS: 37 DEGREES
QRSD INTERVAL: 90 MS
QT INTERVAL: 344 MS
QTC INTERVAL: 390 MS
T WAVE AXIS: 58 DEGREES
VENTRICULAR RATE: 77 BPM

## 2020-10-08 PROCEDURE — 93010 ELECTROCARDIOGRAM REPORT: CPT | Performed by: INTERNAL MEDICINE

## 2020-10-09 ENCOUNTER — HOSPITAL ENCOUNTER (OUTPATIENT)
Dept: RADIOLOGY | Age: 40
Discharge: HOME | End: 2020-10-09
Payer: COMMERCIAL

## 2020-10-09 DIAGNOSIS — R10.9 FLANK PAIN: ICD-10-CM

## 2020-10-09 PROCEDURE — G1004 CDSM NDSC: HCPCS

## 2020-10-09 PROCEDURE — 74176 CT ABD & PELVIS W/O CONTRAST: CPT

## 2020-10-13 ENCOUNTER — TELEPHONE (OUTPATIENT)
Dept: UROLOGY | Facility: HOSPITAL | Age: 40
End: 2020-10-13

## 2020-10-13 DIAGNOSIS — N20.0 NEPHROLITHIASIS: Primary | ICD-10-CM

## 2020-10-27 DIAGNOSIS — R10.9 FLANK PAIN: ICD-10-CM

## 2020-10-27 RX ORDER — TAMSULOSIN HYDROCHLORIDE 0.4 MG/1
CAPSULE ORAL
Qty: 30 CAPSULE | Refills: 0 | Status: SHIPPED | OUTPATIENT
Start: 2020-10-27 | End: 2020-11-05

## 2020-11-05 ENCOUNTER — OFFICE VISIT (OUTPATIENT)
Dept: FAMILY MEDICINE CLINIC | Facility: CLINIC | Age: 40
End: 2020-11-05
Payer: COMMERCIAL

## 2020-11-05 VITALS
TEMPERATURE: 97.9 F | HEIGHT: 70 IN | OXYGEN SATURATION: 98 % | BODY MASS INDEX: 28.77 KG/M2 | DIASTOLIC BLOOD PRESSURE: 92 MMHG | WEIGHT: 201 LBS | HEART RATE: 78 BPM | SYSTOLIC BLOOD PRESSURE: 128 MMHG

## 2020-11-05 DIAGNOSIS — G57.12 MERALGIA PARAESTHETICA, LEFT: Primary | ICD-10-CM

## 2020-11-05 DIAGNOSIS — E66.3 OVERWEIGHT (BMI 25.0-29.9): ICD-10-CM

## 2020-11-05 PROCEDURE — 3008F BODY MASS INDEX DOCD: CPT | Performed by: FAMILY MEDICINE

## 2020-11-05 PROCEDURE — 99213 OFFICE O/P EST LOW 20 MIN: CPT | Performed by: FAMILY MEDICINE

## 2020-11-05 PROCEDURE — 3725F SCREEN DEPRESSION PERFORMED: CPT | Performed by: FAMILY MEDICINE

## 2020-11-05 RX ORDER — TIZANIDINE 2 MG/1
2 TABLET ORAL EVERY 8 HOURS PRN
Qty: 20 TABLET | Refills: 0 | Status: SHIPPED | OUTPATIENT
Start: 2020-11-05 | End: 2021-04-13 | Stop reason: ALTCHOICE

## 2020-11-05 RX ORDER — MELOXICAM 15 MG/1
7.5 TABLET ORAL DAILY
Qty: 20 TABLET | Refills: 0 | Status: SHIPPED | OUTPATIENT
Start: 2020-11-05 | End: 2021-04-13 | Stop reason: ALTCHOICE

## 2020-11-14 ENCOUNTER — APPOINTMENT (EMERGENCY)
Dept: RADIOLOGY | Facility: HOSPITAL | Age: 40
End: 2020-11-14
Payer: COMMERCIAL

## 2020-11-14 ENCOUNTER — HOSPITAL ENCOUNTER (EMERGENCY)
Facility: HOSPITAL | Age: 40
Discharge: HOME | End: 2020-11-14
Attending: EMERGENCY MEDICINE | Admitting: EMERGENCY MEDICINE
Payer: COMMERCIAL

## 2020-11-14 VITALS
TEMPERATURE: 97.9 F | OXYGEN SATURATION: 98 % | DIASTOLIC BLOOD PRESSURE: 90 MMHG | RESPIRATION RATE: 16 BRPM | SYSTOLIC BLOOD PRESSURE: 148 MMHG

## 2020-11-14 DIAGNOSIS — J06.9 UPPER RESPIRATORY INFECTION: Primary | ICD-10-CM

## 2020-11-14 LAB
FLUAV RNA RESP QL NAA+PROBE: NEGATIVE
FLUBV RNA RESP QL NAA+PROBE: NEGATIVE
RSV RNA RESP QL NAA+PROBE: NEGATIVE
SARS-COV-2 RNA RESP QL NAA+PROBE: NEGATIVE

## 2020-11-14 PROCEDURE — 99283 EMERGENCY DEPT VISIT LOW MDM: CPT

## 2020-11-14 PROCEDURE — 99285 EMERGENCY DEPT VISIT HI MDM: CPT | Performed by: PHYSICIAN ASSISTANT

## 2020-11-14 PROCEDURE — 0241U HB NFCT DS VIR RESP RNA 4 TRGT: CPT | Performed by: PHYSICIAN ASSISTANT

## 2020-11-14 PROCEDURE — 71045 X-RAY EXAM CHEST 1 VIEW: CPT

## 2020-11-29 PROBLEM — R35.0 FREQUENT URINATION: Status: RESOLVED | Noted: 2020-09-09 | Resolved: 2020-11-29

## 2020-12-03 ENCOUNTER — OFFICE VISIT (OUTPATIENT)
Dept: FAMILY MEDICINE CLINIC | Facility: CLINIC | Age: 40
End: 2020-12-03
Payer: COMMERCIAL

## 2020-12-03 VITALS
HEART RATE: 72 BPM | BODY MASS INDEX: 27.92 KG/M2 | SYSTOLIC BLOOD PRESSURE: 136 MMHG | TEMPERATURE: 97.7 F | DIASTOLIC BLOOD PRESSURE: 88 MMHG | OXYGEN SATURATION: 99 % | HEIGHT: 70 IN | RESPIRATION RATE: 16 BRPM | WEIGHT: 195 LBS

## 2020-12-03 DIAGNOSIS — Z13.6 SCREENING FOR CARDIOVASCULAR CONDITION: ICD-10-CM

## 2020-12-03 DIAGNOSIS — R03.0 ELEVATED BP WITHOUT DIAGNOSIS OF HYPERTENSION: ICD-10-CM

## 2020-12-03 DIAGNOSIS — Z00.00 ENCOUNTER FOR ANNUAL PHYSICAL EXAM: Primary | ICD-10-CM

## 2020-12-03 DIAGNOSIS — R79.89 ABNORMAL LFTS: ICD-10-CM

## 2020-12-03 PROBLEM — K76.0 FATTY LIVER: Status: ACTIVE | Noted: 2020-12-03

## 2020-12-03 PROCEDURE — 4004F PT TOBACCO SCREEN RCVD TLK: CPT | Performed by: FAMILY MEDICINE

## 2020-12-03 PROCEDURE — 3008F BODY MASS INDEX DOCD: CPT | Performed by: FAMILY MEDICINE

## 2020-12-03 PROCEDURE — 99395 PREV VISIT EST AGE 18-39: CPT | Performed by: FAMILY MEDICINE

## 2020-12-03 PROCEDURE — 3725F SCREEN DEPRESSION PERFORMED: CPT | Performed by: FAMILY MEDICINE

## 2020-12-22 DIAGNOSIS — G57.12 MERALGIA PARAESTHETICA, LEFT: ICD-10-CM

## 2020-12-22 RX ORDER — MELOXICAM 15 MG/1
TABLET ORAL
Qty: 15 TABLET | Refills: 1 | OUTPATIENT
Start: 2020-12-22

## 2020-12-30 ENCOUNTER — TELEMEDICINE (OUTPATIENT)
Dept: FAMILY MEDICINE CLINIC | Facility: CLINIC | Age: 40
End: 2020-12-30
Payer: COMMERCIAL

## 2020-12-30 VITALS — HEIGHT: 69 IN | BODY MASS INDEX: 28.88 KG/M2 | WEIGHT: 195 LBS

## 2020-12-30 DIAGNOSIS — B34.9 ACUTE VIRAL SYNDROME: ICD-10-CM

## 2020-12-30 DIAGNOSIS — B34.9 ACUTE VIRAL SYNDROME: Primary | ICD-10-CM

## 2020-12-30 PROCEDURE — 99213 OFFICE O/P EST LOW 20 MIN: CPT | Performed by: FAMILY MEDICINE

## 2020-12-30 PROCEDURE — 3008F BODY MASS INDEX DOCD: CPT | Performed by: FAMILY MEDICINE

## 2020-12-30 PROCEDURE — U0003 INFECTIOUS AGENT DETECTION BY NUCLEIC ACID (DNA OR RNA); SEVERE ACUTE RESPIRATORY SYNDROME CORONAVIRUS 2 (SARS-COV-2) (CORONAVIRUS DISEASE [COVID-19]), AMPLIFIED PROBE TECHNIQUE, MAKING USE OF HIGH THROUGHPUT TECHNOLOGIES AS DESCRIBED BY CMS-2020-01-R: HCPCS | Performed by: FAMILY MEDICINE

## 2020-12-31 ENCOUNTER — TELEPHONE (OUTPATIENT)
Dept: FAMILY MEDICINE CLINIC | Facility: CLINIC | Age: 40
End: 2020-12-31

## 2020-12-31 LAB — SARS-COV-2 RNA SPEC QL NAA+PROBE: NOT DETECTED

## 2021-01-07 ENCOUNTER — TRANSCRIBE ORDERS (OUTPATIENT)
Dept: LAB | Facility: CLINIC | Age: 41
End: 2021-01-07

## 2021-01-07 ENCOUNTER — LAB (OUTPATIENT)
Dept: LAB | Facility: CLINIC | Age: 41
End: 2021-01-07
Payer: COMMERCIAL

## 2021-01-07 DIAGNOSIS — Z13.6 SCREENING FOR CARDIOVASCULAR CONDITION: ICD-10-CM

## 2021-01-07 DIAGNOSIS — R79.89 ABNORMAL LFTS: ICD-10-CM

## 2021-01-07 LAB
ALBUMIN SERPL BCP-MCNC: 4.1 G/DL (ref 3.5–5)
ALP SERPL-CCNC: 61 U/L (ref 46–116)
ALT SERPL W P-5'-P-CCNC: 231 U/L (ref 12–78)
AST SERPL W P-5'-P-CCNC: 91 U/L (ref 5–45)
BILIRUB DIRECT SERPL-MCNC: 0.15 MG/DL (ref 0–0.2)
BILIRUB SERPL-MCNC: 0.78 MG/DL (ref 0.2–1)
CHOLEST SERPL-MCNC: 269 MG/DL (ref 50–200)
HDLC SERPL-MCNC: 45 MG/DL
LDLC SERPL CALC-MCNC: 176 MG/DL (ref 0–100)
NONHDLC SERPL-MCNC: 224 MG/DL
PROT SERPL-MCNC: 7.3 G/DL (ref 6.4–8.2)
TRIGL SERPL-MCNC: 238 MG/DL

## 2021-01-07 PROCEDURE — 80076 HEPATIC FUNCTION PANEL: CPT

## 2021-01-07 PROCEDURE — 36415 COLL VENOUS BLD VENIPUNCTURE: CPT

## 2021-01-07 PROCEDURE — 80061 LIPID PANEL: CPT

## 2021-01-13 ENCOUNTER — HOSPITAL ENCOUNTER (OUTPATIENT)
Dept: ULTRASOUND IMAGING | Facility: HOSPITAL | Age: 41
Discharge: HOME | End: 2021-01-13
Attending: FAMILY MEDICINE
Payer: COMMERCIAL

## 2021-01-13 DIAGNOSIS — R79.89 ABNORMAL LFTS: ICD-10-CM

## 2021-01-13 PROCEDURE — 76705 ECHO EXAM OF ABDOMEN: CPT

## 2021-01-18 ENCOUNTER — TELEPHONE (OUTPATIENT)
Dept: FAMILY MEDICINE CLINIC | Facility: CLINIC | Age: 41
End: 2021-01-18

## 2021-01-18 DIAGNOSIS — K76.0 FATTY LIVER: Primary | ICD-10-CM

## 2021-01-18 NOTE — TELEPHONE ENCOUNTER
Received phone call from Genesis Sherwood in radiology pertaining to [address] liver ultrasound  She said there was significant findings in [facility] liver ultrasound   TO

## 2021-01-18 NOTE — TELEPHONE ENCOUNTER
Pt has 2 cm area on L lobe of liver that is likely localized fatty infiltration  Recheck U/S of liver in 6 mths

## 2021-01-28 ENCOUNTER — OFFICE VISIT (OUTPATIENT)
Dept: GASTROENTEROLOGY | Facility: CLINIC | Age: 41
End: 2021-01-28
Payer: COMMERCIAL

## 2021-01-28 VITALS
SYSTOLIC BLOOD PRESSURE: 149 MMHG | WEIGHT: 196 LBS | HEART RATE: 68 BPM | BODY MASS INDEX: 29.7 KG/M2 | DIASTOLIC BLOOD PRESSURE: 94 MMHG | HEIGHT: 68 IN

## 2021-01-28 DIAGNOSIS — K76.9 LIVER LESION, LEFT LOBE: ICD-10-CM

## 2021-01-28 DIAGNOSIS — R79.89 ABNORMAL LFTS: Primary | ICD-10-CM

## 2021-01-28 DIAGNOSIS — K21.9 GASTROESOPHAGEAL REFLUX DISEASE, UNSPECIFIED WHETHER ESOPHAGITIS PRESENT: ICD-10-CM

## 2021-01-28 DIAGNOSIS — K76.0 FATTY LIVER: ICD-10-CM

## 2021-01-28 DIAGNOSIS — R16.0 HEPATOMEGALY: ICD-10-CM

## 2021-01-28 PROCEDURE — 99244 OFF/OP CNSLTJ NEW/EST MOD 40: CPT | Performed by: INTERNAL MEDICINE

## 2021-01-28 PROCEDURE — 3008F BODY MASS INDEX DOCD: CPT | Performed by: INTERNAL MEDICINE

## 2021-01-28 PROCEDURE — 4004F PT TOBACCO SCREEN RCVD TLK: CPT | Performed by: INTERNAL MEDICINE

## 2021-01-28 NOTE — PROGRESS NOTES
[facility] - Outpatient Consultation  Dallas Null 45 y o  male MRN: [id number]  Encounter: [id number]          ASSESSMENT AND PLAN:      1  Abnormal LFTs  -     Chronic Hepatitis Panel; Future  -     CARMEN Screen w/ Reflex to Titer/Pattern; Future  -     Anti-smooth muscle antibody, IgG; Future  -     Alpha-1-antitrypsin; Future  -     Ceruloplasmin; Future  -     Iron Panel (Includes Ferritin, Iron Sat%, Iron, and TIBC); Future  -     Hepatitis B surface antibody; Future  -     Gamma GT; Future    2  Fatty liver    3  Gastroesophageal reflux disease, unspecified whether esophagitis present    4  Hepatomegaly    5  Liver lesion, left lobe      Elevated ALT AST most likely from fatty liver, ultrasound suggestive of the same  But should rule out any other liver pathology, will check for hepatitis-B C and iron panel  Repeat LFTs  His main risk factors for fatty liver being hyperlipidemia and slightly high BMI  Encouraged him to lose some weight and treat hyperlipidemia  Mild GERD, anti-reflux measures reviewed diet discussed  Continue PPI for now  If symptoms persist an EGD  Small lesion in the left lobe of the liver probably  Focal fatty sparing  Will repeat ultrasound in 6 months  Follow-up in my office in 3 months    ______________________________________________________________________    HPI:    41-year-old male, found to have elevated LFTs sent for consultation  Denies any known history of liver disease hepatitis jaundice high-risk behavior does not drink much alcohol  Appetite is fair weight stable denies any dysphagia coughing choking spells nocturnal reflux regurgitation bronchitis pneumonias jaundice easy bleeding bruising  Denies excessive heartburn indigestion feeling better on omeprazole  Bowels are regular  Denies any chest pain shortness of breath  Works at [employer]  Lives with his mother    Diet medications more than 10 pertinent systems reviewed  REVIEW OF SYSTEMS:    CONSTITUTIONAL: Denies any fever, chills, rigors, and weight loss  HEENT: No earache or tinnitus  CARDIOVASCULAR: No chest pain or palpitations  RESPIRATORY: Denies any cough, hemoptysis, shortness of breath or dyspnea on exertion  GASTROINTESTINAL: As noted in the History of Present Illness  GENITOURINARY: Denies any hematuria or dysuria  NEUROLOGIC: No dizziness or vertigo  MUSCULOSKELETAL: Denies any joint swellings  SKIN: Denies skin rashes or itching  ENDOCRINE: Denies excessive thirst  Denies intolerance to heat or cold  PSYCHOSOCIAL: Denies depression or anxiety  Denies any recent memory loss  Historical Information   Past Medical History:   Diagnosis Date    Depression     Elevated liver enzymes     Fatty liver     Heartburn     Psychiatric disorder     depression    Renal disorder     stones     Past Surgical History:   Procedure Laterality Date    COLONOSCOPY      NM COLONOSCOPY FLX DX W/COLLJ SPEC WHEN PFRMD N/A 6/11/2018    Procedure: EGD AND COLONOSCOPY;  Surgeon: Steffen Montejo MD;  [facility]; Service: Gastroenterology    NM FRAGMENT KIDNEY STONE/ ESWL Left 11/4/2016    Procedure: Krzysztof Nolen SHOCKWAVE (ESWL); Surgeon: Denver Fey, MD;  Location:  MAIN OR;  Service: Urology    TOOTH EXTRACTION       Social History   Social History     Substance and Sexual Activity   Alcohol Use Yes    Frequency: Monthly or less    Drinks per session: 1 or 2    Comment: socially     Social History     Substance and Sexual Activity   Drug Use No     Social History     Tobacco Use   Smoking Status Light Tobacco Smoker    Packs/day: 0 25    Types: Cigarettes    Start date: 2000   Smokeless Tobacco Never Used   Tobacco Comment    1 cig daily, trying to quit 4/2018       Family History   Problem Relation Age of Onset   [facility] Mig[facility] Mother     Crohn's disease Father     Liver disease Brother     Tourette syndrome Brother     Diabetes Paternal Grandmother        Meds/Allergies       Current Outpatient Medications:     omeprazole (PriLOSEC) 40 MG capsule    PARoxetine (PAXIL) 10 mg tablet    meloxicam (MOBIC) 15 mg tablet    tiZANidine (ZANAFLEX) 2 mg tablet    No Known Allergies        Objective     Blood pressure 149/94, pulse 68, height 5' 8" (1 727 m), weight 88 9 kg (196 lb)  Body mass index is 29 8 kg/m²  PHYSICAL EXAM:      General Appearance:   Alert, cooperative, no distress   HEENT:   Normocephalic, atraumatic, anicteric  Neck:  Supple, symmetrical, trachea midline   Lungs:   Clear to auscultation bilaterally; no rales, rhonchi or wheezing; respirations unlabored    Heart[de-identified]   Regular rate and rhythm; no murmur  Abdomen:   Soft, non-tender, non-distended; normal bowel sounds; no masses, no organomegaly    Genitalia:   Deferred    Rectal:   Deferred    Extremities:  No cyanosis, clubbing or edema    Skin:  No jaundice, rashes, or lesions    Lymph nodes:  No palpable cervical lymphadenopathy        Lab Results:   No visits with results within 1 Day(s) from this visit  Latest known visit with results is:   Lab on 01/07/2021   Component Date Value    Cholesterol 01/07/2021 269*    Triglycerides 01/07/2021 238*    HDL, Direct 01/07/2021 45     LDL Calculated 01/07/2021 176*    Non-HDL-Chol (CHOL-HDL) 01/07/2021 224     Total Bilirubin 01/07/2021 0 78     Bilirubin, Direct 01/07/2021 0 15     Alkaline Phosphatase 01/07/2021 61     AST 01/07/2021 91*    ALT 01/07/2021 231*    Total Protein 01/07/2021 7 3     Albumin 01/07/2021 4 1          Radiology Results:   Us Liver    Result Date: 1/18/2021  Narrative: RIGHT UPPER QUADRANT ULTRASOUND INDICATION:     R94 5: Abnormal results of liver function studies   COMPARISON:  Abdominal ultrasound 5/3/2018 TECHNIQUE:   Real-time ultrasound of the right upper quadrant was performed with a curvilinear transducer with both volumetric sweeps and still imaging techniques  FINDINGS: PANCREAS:  Visualized portions of the pancreas are within normal limits  AORTA AND IVC:  Visualized portions are normal for patient age  LIVER: Size:  Mildly enlarged  The liver measures 17 8 cm in the midclavicular line  Contour:  Surface contour is smooth  Parenchyma: There is moderate diffuse increased echogenicity with smooth echotexture and acoustic beam attenuation  Most consistent with moderate hepatic steatosis  Ill-defined hypoechoic area left lobe of the liver measuring 1 9 cm (series 1 image 23)  No evidence of suspicious mass  Limited imaging of the main portal vein shows it to be patent and hepatopetal   BILIARY: No gallbladder findings  No intrahepatic biliary dilatation  CBD measures 6 mm  No choledocholithiasis  KIDNEY: Right kidney measures 10 4 x 5 7 cm  Within normal limits  ASCITES:   None  Impression: 1  Hepatomegaly and hepatic steatosis  2   Ill-defined 1 9 cm hypoechoic area left lobe of the liver likely represents an area of focal fatty infiltration  Recommend 6 month follow-up ultrasound to ensure stability of this finding  The study was marked in EPIC for significant notification   Workstation performed: [id number]

## 2021-03-01 ENCOUNTER — TELEPHONE (OUTPATIENT)
Dept: FAMILY MEDICINE CLINIC | Facility: CLINIC | Age: 41
End: 2021-03-01

## 2021-03-01 DIAGNOSIS — B34.9 VIRAL INFECTION, UNSPECIFIED: Primary | ICD-10-CM

## 2021-03-01 DIAGNOSIS — B34.9 VIRAL INFECTION, UNSPECIFIED: ICD-10-CM

## 2021-03-01 PROCEDURE — U0005 INFEC AGEN DETEC AMPLI PROBE: HCPCS | Performed by: FAMILY MEDICINE

## 2021-03-01 PROCEDURE — U0003 INFECTIOUS AGENT DETECTION BY NUCLEIC ACID (DNA OR RNA); SEVERE ACUTE RESPIRATORY SYNDROME CORONAVIRUS 2 (SARS-COV-2) (CORONAVIRUS DISEASE [COVID-19]), AMPLIFIED PROBE TECHNIQUE, MAKING USE OF HIGH THROUGHPUT TECHNOLOGIES AS DESCRIBED BY CMS-2020-01-R: HCPCS | Performed by: FAMILY MEDICINE

## 2021-03-01 NOTE — TELEPHONE ENCOUNTER
He has a virtual appt tomorrow at 6 but has symptoms of Covid   Can you order him to get a test done before the virtual? TO

## 2021-03-02 ENCOUNTER — TELEPHONE (OUTPATIENT)
Dept: FAMILY MEDICINE CLINIC | Facility: CLINIC | Age: 41
End: 2021-03-02

## 2021-03-02 ENCOUNTER — TELEMEDICINE (OUTPATIENT)
Dept: FAMILY MEDICINE CLINIC | Facility: CLINIC | Age: 41
End: 2021-03-02
Payer: COMMERCIAL

## 2021-03-02 VITALS — WEIGHT: 196 LBS | HEIGHT: 68 IN | BODY MASS INDEX: 29.7 KG/M2

## 2021-03-02 DIAGNOSIS — J06.9 ACUTE UPPER RESPIRATORY INFECTION: Primary | ICD-10-CM

## 2021-03-02 LAB — SARS-COV-2 RNA RESP QL NAA+PROBE: NEGATIVE

## 2021-03-02 PROCEDURE — 99213 OFFICE O/P EST LOW 20 MIN: CPT | Performed by: FAMILY MEDICINE

## 2021-03-02 NOTE — TELEPHONE ENCOUNTER
Spoke to pt and relayed message that Dr Sin Vasquez would like him to keep his virtual visit and they will discuss RTW at that time     Adiel Trivedi

## 2021-03-02 NOTE — TELEPHONE ENCOUNTER
Patient got his (negative) test results through my chart and he wants to know when he can return to work? He has been out of work since Sunday, Feb 28th  He will need the note to return   # [phone]    Patient will  return to work note

## 2021-03-02 NOTE — ASSESSMENT & PLAN NOTE
Pt has had sxs for 5 days  Had COVID test yesterday and was negative  Pt to inc rest and fluids  Take advil prn sore throat and mucinex DM bid  Note given for work  Call if worsens

## 2021-03-02 NOTE — LETTER
March 2, 2021     Patient: Martha Herbert   YOB: 1980   Date of Visit: 3/2/2021       To Whom it May Concern:    Anni Mantilla is under my professional care  He was seen in my office on 3/2/2021  He is excused from work on 2/28, 3/1, and 3/2  He can return to work on 3/5/21  If you have any questions or concerns, please don't hesitate to call           Sincerely,          Kim Cavazos MD        CC: No Recipients

## 2021-03-02 NOTE — PROGRESS NOTES
Virtual Regular Visit      Assessment/Plan:    Problem List Items Addressed This Visit        Respiratory    Acute upper respiratory infection - Primary     Pt has had sxs for 5 days  Had COVID test yesterday and was negative  Pt to inc rest and fluids  Take advil prn sore throat and mucinex DM bid  Note given for work  Call if worsens  BMI Counseling: Body mass index is 29 8 kg/m²  The BMI is above normal  Nutrition recommendations include decreasing portion sizes, encouraging healthy choices of fruits and vegetables, consuming healthier snacks and moderation in carbohydrate intake  Exercise recommendations include exercising 3-5 times per week  No pharmacotherapy was ordered  Reason for visit is   Chief Complaint   Patient presents with    Sore Throat    Cough    Nausea    COVID-19     Patient was not exposed to anyone    Virtual Regular Visit        Encounter provider Carl Mo MD    Provider located at [address] [place] [address] [email or web address] [id number] [phone]      Recent Visits  Date Type Provider Dept   03/01/21 Telephone [address] [place] [address] recent visits within past 7 days and meeting all other requirements     Today's Visits  Date Type Provider Dept   03/02/21 Telephone Carl Mo MD [facility]   03/02/21 [facility] MD Rosa Pg [address] today's visits and meeting all other requirements     Future Appointments  No visits were found meeting these conditions  Showing future appointments within next 150 days and meeting all other requirements        The patient was identified by name and date of birth  Radharod Wood was informed that this is a telemedicine visit and that the visit is being conducted through [employer] and patient was informed that this is not a secure, HIPAA-compliant platform  He agrees to proceed     My office door was closed   No one else was in the room  He acknowledged consent and understanding of privacy and security of the video platform  The patient has agreed to participate and understands they can discontinue the visit at any time  Patient is aware this is a billable service  Subjective  Elenita Fernandez is a 45 y o  male    Pt has cough and sore throat for past 5 days  No fever or chills  Feels tired  No aches  No sob or chest tightness  No sick contacts  Taking advil prn  Had COVID test yesterday and was negative  No loss of taste or smell  No n/v/d  Mucus is clear  Past Medical History:   Diagnosis Date    Depression     Elevated liver enzymes     Fatty liver     Heartburn     Psychiatric disorder     depression    Renal disorder     stones       Past Surgical History:   Procedure Laterality Date    COLONOSCOPY      FL COLONOSCOPY FLX DX W/COLLJ SPEC WHEN PFRMD N/A 6/11/2018    Procedure: EGD AND COLONOSCOPY;  Surgeon: Lesvia Acosta MD;  [facility]  GI [facility]; Service: Gastroenterology    FL FRAGMENT KIDNEY STONE/ ESWL Left 11/4/2016    Procedure: Carlie Gunning SHOCKWAVE (ESWL); Surgeon: Gilbert Salazar MD;  [facility];  Service: Urology    TOOTH EXTRACTION         Current Outpatient Medications   Medication Sig Dispense Refill    omeprazole (PriLOSEC) 40 MG capsule TAKE 1 CAPSULE BY MOUTH EVERY DAY 90 capsule 2    PARoxetine (PAXIL) 10 mg tablet TAKE 1 TABLET BY MOUTH EVERY DAY 90 tablet 2    meloxicam (MOBIC) 15 mg tablet Take 0 5 tablets (7 5 mg total) by mouth daily (Patient not taking: Reported on 1/28/2021) 20 tablet 0    tiZANidine (ZANAFLEX) 2 mg tablet Take 1 tablet (2 mg total) by mouth every 8 (eight) hours as needed for muscle spasms (Patient not taking: Reported on 1/28/2021) 20 tablet 0     No current facility-administered medications for this visit  No Known Allergies    Review of Systems   Constitutional: Positive for fatigue   Negative for chills and fever    HENT: Positive for congestion and sore throat  Negative for ear pain, postnasal drip, rhinorrhea, sinus pressure, sinus pain and trouble swallowing  Respiratory: Positive for cough  Negative for chest tightness, shortness of breath and wheezing  Cardiovascular: Negative for chest pain  Gastrointestinal: Negative for abdominal pain, diarrhea, nausea and vomiting  Musculoskeletal: Negative for arthralgias  Skin: Negative for rash  Neurological: Negative for dizziness and headaches  Video Exam    Vitals:    03/02/21 1817   Weight: 88 9 kg (196 lb)   Height: 5' 8" (1 727 m)       Physical Exam  Vitals signs and nursing note reviewed  Constitutional:       General: He is not in acute distress  Appearance: He is well-developed and normal weight  He is ill-appearing  He is not toxic-appearing  HENT:      Head: Normocephalic and atraumatic  Nose: Congestion present  Mouth/Throat:      Pharynx: No posterior oropharyngeal erythema  Cardiovascular:      Rate and Rhythm: Normal rate  Pulmonary:      Effort: Pulmonary effort is normal  No respiratory distress  Comments: Pt has occasional cough  Neurological:      Mental Status: He is alert and oriented to person, place, and time  Psychiatric:         Behavior: Behavior normal          Thought Content: Thought content normal          Judgment: Judgment normal           I spent 15 minutes directly with the patient during this visit      VIRTUAL VISIT DISCLAIMER    Nadya Renee acknowledges that he has consented to an online visit or consultation  He understands that the online visit is based solely on information provided by him, and that, in the absence of a face-to-face physical evaluation by the physician, the diagnosis he receives is both limited and provisional in terms of accuracy and completeness  This is not intended to replace a full medical face-to-face evaluation by the physician   Nadya Renee understands and accepts these terms

## 2021-03-12 ENCOUNTER — TELEMEDICINE (OUTPATIENT)
Dept: FAMILY MEDICINE CLINIC | Facility: CLINIC | Age: 41
End: 2021-03-12
Payer: COMMERCIAL

## 2021-03-12 ENCOUNTER — TELEPHONE (OUTPATIENT)
Dept: FAMILY MEDICINE CLINIC | Facility: CLINIC | Age: 41
End: 2021-03-12

## 2021-03-12 VITALS — BODY MASS INDEX: 29.7 KG/M2 | WEIGHT: 196 LBS | HEIGHT: 68 IN

## 2021-03-12 DIAGNOSIS — J02.9 ACUTE PHARYNGITIS, UNSPECIFIED ETIOLOGY: Primary | ICD-10-CM

## 2021-03-12 PROCEDURE — 3725F SCREEN DEPRESSION PERFORMED: CPT | Performed by: FAMILY MEDICINE

## 2021-03-12 PROCEDURE — 99213 OFFICE O/P EST LOW 20 MIN: CPT | Performed by: FAMILY MEDICINE

## 2021-03-12 PROCEDURE — 4004F PT TOBACCO SCREEN RCVD TLK: CPT | Performed by: FAMILY MEDICINE

## 2021-03-12 PROCEDURE — 3008F BODY MASS INDEX DOCD: CPT | Performed by: FAMILY MEDICINE

## 2021-03-12 RX ORDER — AMOXICILLIN 875 MG/1
875 TABLET, COATED ORAL 2 TIMES DAILY
Qty: 14 TABLET | Refills: 0 | Status: SHIPPED | OUTPATIENT
Start: 2021-03-12 | End: 2021-03-19

## 2021-03-12 NOTE — LETTER
March 12, 2021     Patient: Vitor Carrera   YOB: 1980   Date of Visit: 3/12/2021       To Whom it May Concern:    Jeff Cancer is under my professional care  He was seen in my office on 3/12/2021  He is excused from work on 3/12/21  If you have any questions or concerns, please don't hesitate to call           Sincerely,          Babar Shine MD        CC: No Recipients

## 2021-03-12 NOTE — ASSESSMENT & PLAN NOTE
Pt has had his sxs for 10 days and not getting better  Will start abxs  Pt to take tylenol or advil prn and gargle with warm salt water prn  Call if no better

## 2021-03-12 NOTE — PROGRESS NOTES
Virtual Regular Visit      Assessment/Plan:    Problem List Items Addressed This Visit        Digestive    Acute pharyngitis - Primary     Pt has had his sxs for 10 days and not getting better  Will start abxs  Pt to take tylenol or advil prn and gargle with warm salt water prn  Call if no better  Relevant Medications    amoxicillin (AMOXIL) 875 mg tablet               Reason for visit is   Chief Complaint   Patient presents with    Sore Throat     sore throat, cough since last week- covid test negative    Virtual Regular Visit        Encounter provider Urban Hernandez MD    Provider located at [address] [place] [id number] [phone]      Recent Visits  No visits were found meeting these conditions  Showing recent visits within past 7 days and meeting all other requirements     Today's Visits  Date Type Provider Dept   03/12/21 Daniela Rand MD [facility]   03/12/21 Telephone Urban Hernandez MD [place] [address] today's visits and meeting all other requirements     Future Appointments  No visits were found meeting these conditions  Showing future appointments within next 150 days and meeting all other requirements        The patient was identified by name and date of birth  Vitaliy Rojas was informed that this is a telemedicine visit and that the visit is being conducted through [employer] and patient was informed that this is not a secure, HIPAA-compliant platform  He agrees to proceed     My office door was closed  No one else was in the room  He acknowledged consent and understanding of privacy and security of the video platform  The patient has agreed to participate and understands they can discontinue the visit at any time  Patient is aware this is a billable service  Subjective  Vitaliy Rojas is a 45 y o  male    Pt has had sore throat and cough for past 10 days   Had COVID test and was negative  Not getting better  No fever or chills  No sinus congestion or headaches  No sob or chest tightness  Feels tired  Has been taking tylenol and allergy med  Hurts to swallow  Past Medical History:   Diagnosis Date    Depression     Elevated liver enzymes     Fatty liver     Heartburn     Psychiatric disorder     depression    Renal disorder     stones       Past Surgical History:   Procedure Laterality Date    COLONOSCOPY      [place] COLONOSCOPY FLX DX W/COLLJ SPEC WHEN PFRMD N/A 6/11/2018    Procedure: EGD AND COLONOSCOPY;  Surgeon: Giovani Key MD;  [facility]: [facility]  GI [facility]; Service: Gastroenterology    [place] FRAGMENT KIDNEY STONE/ ESWL Left 11/4/2016    Procedure: Meera Pineda SHOCKWAVE (ESWL); Surgeon: Jonh Vasquez MD;  [facility];  Service: Urology    TOOTH EXTRACTION         Current Outpatient Medications   Medication Sig Dispense Refill    omeprazole (PriLOSEC) 40 MG capsule TAKE 1 CAPSULE BY MOUTH EVERY DAY 90 capsule 2    PARoxetine (PAXIL) 10 mg tablet TAKE 1 TABLET BY MOUTH EVERY DAY 90 tablet 2    amoxicillin (AMOXIL) 875 mg tablet Take 1 tablet (875 mg total) by mouth 2 (two) times a day for 7 days 14 tablet 0    meloxicam (MOBIC) 15 mg tablet Take 0 5 tablets (7 5 mg total) by mouth daily (Patient not taking: Reported on 1/28/2021) 20 tablet 0    tiZANidine (ZANAFLEX) 2 mg tablet Take 1 tablet (2 mg total) by mouth every 8 (eight) hours as needed for muscle spasms (Patient not taking: Reported on 1/28/2021) 20 tablet 0     No current facility-administered medications for this visit  No Known Allergies    Review of Systems   Constitutional: Positive for fatigue  Negative for chills and fever  HENT: Positive for sore throat  Negative for congestion, ear pain, postnasal drip, rhinorrhea, sinus pressure, sinus pain and trouble swallowing  Respiratory: Positive for cough   Negative for chest tightness, shortness of breath and wheezing  Cardiovascular: Negative for chest pain  Gastrointestinal: Negative for abdominal pain, diarrhea, nausea and vomiting  Musculoskeletal: Negative for arthralgias  Skin: Negative for rash  Neurological: Negative for dizziness and headaches  Video Exam    Vitals:    03/12/21 1136   Weight: 88 9 kg (196 lb)   Height: 5' 8" (1 727 m)       Physical Exam  Vitals signs and nursing note reviewed  Constitutional:       General: He is not in acute distress  Appearance: He is well-developed  He is not ill-appearing or toxic-appearing  HENT:      Head: Normocephalic and atraumatic  Nose: No congestion  Mouth/Throat:      Pharynx: Posterior oropharyngeal erythema present  Cardiovascular:      Rate and Rhythm: Normal rate  Pulmonary:      Effort: Pulmonary effort is normal  No respiratory distress  Neurological:      Mental Status: He is alert and oriented to person, place, and time  Psychiatric:         Behavior: Behavior normal          Thought Content: Thought content normal          Judgment: Judgment normal           I spent 15 minutes directly with the patient during this visit      VIRTUAL VISIT DISCLAIMER    Piyush Salcido acknowledges that he has consented to an online visit or consultation  He understands that the online visit is based solely on information provided by him, and that, in the absence of a face-to-face physical evaluation by the physician, the diagnosis he receives is both limited and provisional in terms of accuracy and completeness  This is not intended to replace a full medical face-to-face evaluation by the physician  Piyush Salcido understands and accepts these terms

## 2021-03-26 ENCOUNTER — APPOINTMENT (OUTPATIENT)
Dept: LAB | Facility: CLINIC | Age: 41
End: 2021-03-26
Payer: COMMERCIAL

## 2021-03-26 DIAGNOSIS — R79.89 ABNORMAL LFTS: ICD-10-CM

## 2021-03-26 LAB
FERRITIN SERPL-MCNC: 120 NG/ML (ref 8–388)
GGT SERPL-CCNC: 127 U/L (ref 5–85)
HBV CORE AB SER QL: NORMAL
HBV CORE IGM SER QL: NORMAL
HBV SURFACE AB SER-ACNC: <3.1 MIU/ML
HBV SURFACE AG SER QL: NORMAL
HCV AB SER QL: NORMAL
IRON SATN MFR SERPL: 25 %
IRON SERPL-MCNC: 102 UG/DL (ref 65–175)
TIBC SERPL-MCNC: 404 UG/DL (ref 250–450)

## 2021-03-26 PROCEDURE — 82390 ASSAY OF CERULOPLASMIN: CPT

## 2021-03-26 PROCEDURE — 86038 ANTINUCLEAR ANTIBODIES: CPT

## 2021-03-26 PROCEDURE — 83550 IRON BINDING TEST: CPT

## 2021-03-26 PROCEDURE — 36415 COLL VENOUS BLD VENIPUNCTURE: CPT

## 2021-03-26 PROCEDURE — 86704 HEP B CORE ANTIBODY TOTAL: CPT

## 2021-03-26 PROCEDURE — 86803 HEPATITIS C AB TEST: CPT

## 2021-03-26 PROCEDURE — 86705 HEP B CORE ANTIBODY IGM: CPT

## 2021-03-26 PROCEDURE — 82977 ASSAY OF GGT: CPT

## 2021-03-26 PROCEDURE — 86235 NUCLEAR ANTIGEN ANTIBODY: CPT

## 2021-03-26 PROCEDURE — 82103 ALPHA-1-ANTITRYPSIN TOTAL: CPT

## 2021-03-26 PROCEDURE — 83540 ASSAY OF IRON: CPT

## 2021-03-26 PROCEDURE — 82728 ASSAY OF FERRITIN: CPT

## 2021-03-26 PROCEDURE — 86706 HEP B SURFACE ANTIBODY: CPT

## 2021-03-26 PROCEDURE — 87340 HEPATITIS B SURFACE AG IA: CPT

## 2021-03-27 LAB
A1AT SERPL-MCNC: 123 MG/DL (ref 95–164)
ACTIN IGG SERPL-ACNC: 4 UNITS (ref 0–19)
CERULOPLASMIN SERPL-MCNC: 26.1 MG/DL (ref 16–31)

## 2021-03-29 LAB — RYE IGE QN: NEGATIVE

## 2021-04-08 ENCOUNTER — OFFICE VISIT (OUTPATIENT)
Dept: GASTROENTEROLOGY | Facility: CLINIC | Age: 41
End: 2021-04-08
Payer: COMMERCIAL

## 2021-04-08 VITALS
DIASTOLIC BLOOD PRESSURE: 104 MMHG | HEART RATE: 78 BPM | HEIGHT: 68 IN | WEIGHT: 192 LBS | SYSTOLIC BLOOD PRESSURE: 145 MMHG | BODY MASS INDEX: 29.1 KG/M2

## 2021-04-08 DIAGNOSIS — K21.9 GASTROESOPHAGEAL REFLUX DISEASE, UNSPECIFIED WHETHER ESOPHAGITIS PRESENT: ICD-10-CM

## 2021-04-08 DIAGNOSIS — R79.89 ABNORMAL LFTS: Primary | ICD-10-CM

## 2021-04-08 PROCEDURE — 99214 OFFICE O/P EST MOD 30 MIN: CPT | Performed by: NURSE PRACTITIONER

## 2021-04-08 NOTE — PATIENT INSTRUCTIONS
Have lab work done 2 weeks prior to follow up  Limit alcohol intake  Follow up with PCP for elevated blood pressure

## 2021-04-08 NOTE — PROGRESS NOTES
[facility] - Outpatient Follow-up Note  Jazmín Samuel 45 y o  male MRN: [id number]  Encounter: [id number]          ASSESSMENT AND PLAN:      1  Abnormal LFTs  Abnormal LFTs most likely due to fatty liver  Ultrasound of liver showed hepatomegaly with hepatic steatosis  Workup for underlying liver disease was all negative except for a slightly elevated gamma-   -patient advised to lose weight with healthy eating and exercise   -patient advised to limit alcohol intake  - Hepatic function panel; have lab work draw 2 weeks prior to follow up    2  Gastroesophageal reflux disease, unspecified whether esophagitis present  Patient reports symptoms of GERD well controlled  Denies acid reflux, heartburn, nausea, vomiting, or epigastric pain  Continue omeprazole 40 mg daily  3  Elevated blood pressure  For patient's history patient has a history of elevated blood pressure without being diagnosed with hypertension  Patient's blood pressure was elevated in the office in patient reported he had intermittent episodes of lightheadedness  Patient's blood pressure was recheck /96  Patient informed to follow-up with primary care physician concerning elevated blood pressure  Patient verbalized understanding  Follow-up in 6 months      ______________________________________________________________________    SUBJECTIVE:  Chelsea Rosas is of 67-year-old male who presents to office for follow-up for abnormal LFTs the and GERD  Patient has history of chronic depression and elevated BP without a diagnosis of hypertension  Blood pressure in office was elevated patient did complain of intermittent episodes of lightheadedness  Blood pressure recheck /96  Patient advised to follow up with primary care doctor concerning elevated blood pressure  Patient currently denies any GI symptoms  Patient denies nausea, vomiting, acid reflux, heartburn, dysphagia, epigastric or abdominal pain  Patient denies blood in stool, blood from rectal area, or black tarry stool  Patient reports bowel patterns are regular  No pain associated with bowel movements  Workup was done concerning elevated LFTs  AMA, CARMEN, ASMA, hepatitis panel, alpha 1 antitrypsin, ceruloplasmin and iron panel all within normal limits  Gamma GGT slightly elevated at 127  Ultrasound of liver done 01/27/2021 showed hepatomegaly and hepatic steatosis  Ill-defined 1 9 cm hypoechoic area left lobe of liver likely represents an area of fatty infiltrate  Patient does smoke occasionally  Patient reports he drinks alcohol only socially  No family history of gastric or colon cancer  REVIEW OF SYSTEMS IS OTHERWISE NEGATIVE  Historical Information   Past Medical History:   Diagnosis Date    Depression     Elevated liver enzymes     Fatty liver     Heartburn     Heartburn     Psychiatric disorder     depression    Renal disorder     stones     Past Surgical History:   Procedure Laterality Date    COLONOSCOPY      PA COLONOSCOPY FLX DX W/COLLJ SPEC WHEN PFRMD N/A 6/11/2018    Procedure: EGD AND COLONOSCOPY;  Surgeon: Juana Jose MD;  Location: [facility]; Service: Gastroenterology    PA FRAGMENT KIDNEY STONE/ ESWL Left 11/4/2016    Procedure: Liliana Garcia SHOCKWAVE (ESWL); Surgeon: Maurice Arauz MD;  Location: [facility];  Service: Urology    TOOTH EXTRACTION      UPPER GASTROINTESTINAL ENDOSCOPY       Social History   Social History     Substance and Sexual Activity   Alcohol Use Yes    Frequency: Monthly or less    Drinks per session: 1 or 2    Comment: socially     Social History     Substance and Sexual Activity   Drug Use No     Social History     Tobacco Use   Smoking Status Light Tobacco Smoker    Packs/day: 0 25    Types: Cigarettes    Start date: 2000   Smokeless Tobacco Never Used   Tobacco Comment    1 cig daily, trying to quit 4/2018       Family History   Problem Relation Age of Onset    Migraines Mother     Crohn's disease Father     Liver disease Brother     Tourette syndrome Brother     Diabetes Paternal Grandmother        Meds/Allergies       Current Outpatient Medications:     omeprazole (PriLOSEC) 40 MG capsule    PARoxetine (PAXIL) 10 mg tablet    meloxicam (MOBIC) 15 mg tablet    tiZANidine (ZANAFLEX) 2 mg tablet    No Known Allergies        Objective     Blood pressure (!) 145/104, pulse 78, height 5' 8" (1 727 m), weight 87 1 kg (192 lb)  Body mass index is 29 19 kg/m²  PHYSICAL EXAM:      General Appearance:   Alert, cooperative, no distress   HEENT:   Normocephalic, atraumatic, anicteric      Neck:  Supple, symmetrical, trachea midline   Lungs:   Clear to auscultation bilaterally; no rales, rhonchi or wheezing; respirations unlabored    Heart[de-identified]   Regular rate and rhythm; no murmur, rub, or gallop  Abdomen:   Soft, non-tender, non-distended; normal bowel sounds; no masses, no organomegaly    Genitalia:   Deferred    Rectal:   Deferred    Extremities:  No cyanosis, clubbing or edema    Pulses:  2+ and symmetric    Skin:  No jaundice, rashes, or lesions    Lymph nodes:  No palpable cervical lymphadenopathy        Lab Results:   No visits with results within 1 Day(s) from this visit  Latest known visit with results is:   Appointment on 03/26/2021   Component Date Value    Hepatitis B Surface Ag 03/26/2021 Non-reactive     Hepatitis C Ab 03/26/2021 Non-reactive     Hep B C IgM 03/26/2021 Non-reactive     Hep B Core Total Ab 03/26/2021 Non-reactive     CARMEN 03/26/2021 Negative     Smooth Muscle Ab 03/26/2021 4     A-1 Antitrypsin 03/26/2021 123     Ceruloplasmin 03/26/2021 26 1     Hep B S Ab 03/26/2021 <3 10     GGT 03/26/2021 127*    Iron Saturation 03/26/2021 25     TIBC 03/26/2021 404     Iron 03/26/2021 102     Ferritin 03/26/2021 120          Radiology Results:   No results found

## 2021-04-13 ENCOUNTER — OFFICE VISIT (OUTPATIENT)
Dept: FAMILY MEDICINE CLINIC | Facility: CLINIC | Age: 41
End: 2021-04-13
Payer: COMMERCIAL

## 2021-04-13 VITALS
DIASTOLIC BLOOD PRESSURE: 104 MMHG | TEMPERATURE: 97.2 F | OXYGEN SATURATION: 98 % | BODY MASS INDEX: 29.55 KG/M2 | WEIGHT: 195 LBS | RESPIRATION RATE: 16 BRPM | SYSTOLIC BLOOD PRESSURE: 140 MMHG | HEART RATE: 88 BPM | HEIGHT: 68 IN

## 2021-04-13 DIAGNOSIS — Z23 ENCOUNTER FOR IMMUNIZATION: ICD-10-CM

## 2021-04-13 DIAGNOSIS — I10 ESSENTIAL HYPERTENSION: Primary | ICD-10-CM

## 2021-04-13 PROBLEM — B34.9 ACUTE VIRAL SYNDROME: Status: RESOLVED | Noted: 2020-12-30 | Resolved: 2021-04-13

## 2021-04-13 PROBLEM — E78.00 HYPERCHOLESTEROLEMIA: Status: ACTIVE | Noted: 2021-04-13

## 2021-04-13 PROBLEM — J02.9 ACUTE PHARYNGITIS: Status: RESOLVED | Noted: 2021-03-12 | Resolved: 2021-04-13

## 2021-04-13 PROBLEM — J06.9 ACUTE UPPER RESPIRATORY INFECTION: Status: RESOLVED | Noted: 2021-03-02 | Resolved: 2021-04-13

## 2021-04-13 PROBLEM — R03.0 ELEVATED BP WITHOUT DIAGNOSIS OF HYPERTENSION: Status: RESOLVED | Noted: 2020-12-03 | Resolved: 2021-04-13

## 2021-04-13 PROCEDURE — 3008F BODY MASS INDEX DOCD: CPT | Performed by: FAMILY MEDICINE

## 2021-04-13 PROCEDURE — 99213 OFFICE O/P EST LOW 20 MIN: CPT | Performed by: FAMILY MEDICINE

## 2021-04-13 PROCEDURE — 4004F PT TOBACCO SCREEN RCVD TLK: CPT | Performed by: FAMILY MEDICINE

## 2021-04-13 PROCEDURE — 3725F SCREEN DEPRESSION PERFORMED: CPT | Performed by: FAMILY MEDICINE

## 2021-04-13 RX ORDER — VALSARTAN 80 MG/1
80 TABLET ORAL DAILY
Qty: 30 TABLET | Refills: 3 | Status: SHIPPED | OUTPATIENT
Start: 2021-04-13 | End: 2021-05-04 | Stop reason: SDUPTHER

## 2021-04-13 NOTE — PROGRESS NOTES
Tobacco Cessation Counseling: Tobacco cessation counseling was provided  The patient is sincerely urged to quit consumption of tobacco  He is ready to quit tobacco  Medication options and side effects of medication discussed  Patient refused medication  Assessment/Plan:         Problem List Items Addressed This Visit        Cardiovascular and Mediastinum    Essential hypertension - Primary     Will start med  Pt advised to follow a low Na diet and to exercise on a regular basis  Recheck in 3 weeks  Relevant Medications    valsartan (DIOVAN) 80 mg tablet            Subjective:      Patient ID: Dominique Valencia is a 45 y o  male  Pt here for eval of elevated BP  Pt was seen in Dr Alexandria Burnham office last week and was 148/104  Mom has HTN  No cp/sob  Gets headaches at times  Has chips at times  Doesn't add Na to food  Pt drinks soda at times  No coffee or energy drinks  The following portions of the patient's history were reviewed and updated as appropriate:   Past Medical History:  He has a past medical history of Depression, Elevated liver enzymes, Fatty liver, Heartburn, Psychiatric disorder, and Renal disorder  ,  _______________________________________________________________________  Medical Problems:  does not have any pertinent problems on file ,  _______________________________________________________________________  Past Surgical History:   has a past surgical history that includes Tooth extraction; pr fragment kidney stone/ eswl (Left, 11/4/2016); pr colonoscopy flx dx w/collj spec when pfrmd (N/A, 6/11/2018); Colonoscopy; and Upper gastrointestinal endoscopy  ,  _______________________________________________________________________  Family History:  family history includes Crohn's disease in his father; Diabetes in his paternal grandmother; Liver disease in his brother; Migraines in his mother;  Tourette syndrome in his brother ,  _______________________________________________________________________  Social History:   reports that he has been smoking cigarettes  He started smoking about 21 years ago  He has been smoking about 0 25 packs per day  He has never used smokeless tobacco  He reports current alcohol use  He reports that he does not use drugs  ,  _______________________________________________________________________  Allergies:  has No Known Allergies     _______________________________________________________________________  Current Outpatient Medications   Medication Sig Dispense Refill    omeprazole (PriLOSEC) 40 MG capsule TAKE 1 CAPSULE BY MOUTH EVERY DAY 90 capsule 2    PARoxetine (PAXIL) 10 mg tablet TAKE 1 TABLET BY MOUTH EVERY DAY 90 tablet 2    valsartan (DIOVAN) 80 mg tablet Take 1 tablet (80 mg total) by mouth daily 30 tablet 3     No current facility-administered medications for this visit       _______________________________________________________________________  Review of Systems   Constitutional: Negative for fatigue and unexpected weight change  Respiratory: Negative for cough and shortness of breath  Cardiovascular: Negative for chest pain  Gastrointestinal: Negative for abdominal pain, constipation, diarrhea and vomiting  Musculoskeletal: Negative for arthralgias  Neurological: Negative for dizziness and headaches  Psychiatric/Behavioral: Negative for dysphoric mood  The patient is not nervous/anxious  Objective:  Vitals:    04/13/21 1056   BP: (!) 140/104   BP Location: Left arm   Patient Position: Sitting   Cuff Size: Adult   Pulse: 88   Resp: 16   Temp: (!) 97 2 °F (36 2 °C)   TempSrc: Temporal   SpO2: 98%   Weight: 88 5 kg (195 lb)   Height: 5' 8" (1 727 m)     Body mass index is 29 65 kg/m²  Physical Exam  Vitals signs and nursing note reviewed  Constitutional:       Appearance: Normal appearance  He is well-developed and normal weight     Neck: Musculoskeletal: Normal range of motion and neck supple  Thyroid: No thyromegaly  Cardiovascular:      Rate and Rhythm: Normal rate and regular rhythm  Heart sounds: Normal heart sounds  No murmur  Pulmonary:      Effort: Pulmonary effort is normal  No respiratory distress  Breath sounds: Normal breath sounds  No wheezing  Musculoskeletal:      Right lower leg: No edema  Left lower leg: No edema  Lymphadenopathy:      Cervical: No cervical adenopathy  Neurological:      Mental Status: He is alert and oriented to person, place, and time  Cranial Nerves: No cranial nerve deficit  Psychiatric:         Mood and Affect: Mood normal          Behavior: Behavior normal          Thought Content:  Thought content normal          Judgment: Judgment normal

## 2021-04-13 NOTE — ASSESSMENT & PLAN NOTE
Will start med  Pt advised to follow a low Na diet and to exercise on a regular basis  Recheck in 3 weeks

## 2021-05-01 ENCOUNTER — IMMUNIZATIONS (OUTPATIENT)
Dept: FAMILY MEDICINE CLINIC | Facility: HOSPITAL | Age: 41
End: 2021-05-01

## 2021-05-01 DIAGNOSIS — Z23 ENCOUNTER FOR IMMUNIZATION: Primary | ICD-10-CM

## 2021-05-01 PROCEDURE — 0001A SARS-COV-2 / COVID-19 MRNA VACCINE (PFIZER-BIONTECH) 30 MCG: CPT

## 2021-05-01 PROCEDURE — 91300 SARS-COV-2 / COVID-19 MRNA VACCINE (PFIZER-BIONTECH) 30 MCG: CPT

## 2021-05-04 ENCOUNTER — OFFICE VISIT (OUTPATIENT)
Dept: FAMILY MEDICINE CLINIC | Facility: CLINIC | Age: 41
End: 2021-05-04
Payer: COMMERCIAL

## 2021-05-04 VITALS
HEIGHT: 68 IN | DIASTOLIC BLOOD PRESSURE: 88 MMHG | RESPIRATION RATE: 16 BRPM | WEIGHT: 192 LBS | HEART RATE: 80 BPM | SYSTOLIC BLOOD PRESSURE: 116 MMHG | OXYGEN SATURATION: 97 % | BODY MASS INDEX: 29.1 KG/M2 | TEMPERATURE: 97.3 F

## 2021-05-04 DIAGNOSIS — E78.00 HYPERCHOLESTEROLEMIA: ICD-10-CM

## 2021-05-04 DIAGNOSIS — R79.89 ABNORMAL LFTS: ICD-10-CM

## 2021-05-04 DIAGNOSIS — I10 ESSENTIAL HYPERTENSION: Primary | ICD-10-CM

## 2021-05-04 PROCEDURE — 99214 OFFICE O/P EST MOD 30 MIN: CPT | Performed by: FAMILY MEDICINE

## 2021-05-04 PROCEDURE — 3079F DIAST BP 80-89 MM HG: CPT | Performed by: FAMILY MEDICINE

## 2021-05-04 PROCEDURE — 4004F PT TOBACCO SCREEN RCVD TLK: CPT | Performed by: FAMILY MEDICINE

## 2021-05-04 PROCEDURE — 3074F SYST BP LT 130 MM HG: CPT | Performed by: FAMILY MEDICINE

## 2021-05-04 PROCEDURE — 3008F BODY MASS INDEX DOCD: CPT | Performed by: FAMILY MEDICINE

## 2021-05-04 RX ORDER — VALSARTAN 80 MG/1
80 TABLET ORAL DAILY
Qty: 90 TABLET | Refills: 2 | Status: SHIPPED | OUTPATIENT
Start: 2021-05-04 | End: 2021-08-16 | Stop reason: SDUPTHER

## 2021-05-04 NOTE — ASSESSMENT & PLAN NOTE
Pt saw Dr Abby Iniguez in April 2021  Had complete lab work-up and was ok  Etiology is thought to be due to fatty liver  Pt was told to follow low fat diet and to exercise on a regular basis  Next appt with Dr Abby Iniguez is in Oct 2021

## 2021-05-04 NOTE — PROGRESS NOTES
Tobacco Cessation Counseling: The patient is sincerely urged to quit consumption of tobacco  He is not ready to quit tobacco    Assessment/Plan:         Problem List Items Addressed This Visit        Cardiovascular and Mediastinum    Essential hypertension - Primary     BP better  Continue valsartan 80 mg qd  Pt advised to continue low Na diet and to exercise on a regular basis  Relevant Medications    valsartan (DIOVAN) 80 mg tablet    Other Relevant Orders    Comprehensive metabolic panel       Other    Hypercholesterolemia     Chol was 269 and LDL was 176 in Jan 2021  Recheck lipids  Advised pt to follow a low cholesterol diet and to exercise on a regular basis  Relevant Orders    Lipid panel    Comprehensive metabolic panel    Abnormal LFTs     Pt saw Dr Denilson Hardin in April 2021  Had complete lab work-up and was ok  Etiology is thought to be due to fatty liver  Pt was told to follow low fat diet and to exercise on a regular basis  Next appt with Dr Denilson Hardin is in Oct 2021  Subjective:      Patient ID: Malvin Godoy is a 45 y o  male  Pt here for f/u HTN, HL, and increased LFTs  Doing ok  No cp/sob  No headaches  Has been walking more  Following low Na diet  BP doing better  No pain  Pt more depressed due to friend passing away on 5/1/21  No other c/o  Saw Dr Denilson Hardin a few weeks ago and had labs and were ok  He thinks his LFTs are high due to fatty liver  The following portions of the patient's history were reviewed and updated as appropriate:   Past Medical History:  He has a past medical history of Depression, Elevated liver enzymes, Fatty liver, Heartburn, Hypertension (4/2021), Psychiatric disorder, and Renal disorder  ,  _______________________________________________________________________  Medical Problems:  does not have any pertinent problems on file ,  _______________________________________________________________________  Past Surgical History:   has a past surgical history that includes Tooth extraction; pr fragment kidney stone/ eswl (Left, 11/4/2016); pr colonoscopy flx dx w/collj spec when pfrmd (N/A, 6/11/2018); Colonoscopy; and Upper gastrointestinal endoscopy  ,  _______________________________________________________________________  Family History:  family history includes Cancer in his father; Crohn's disease in his father; Depression in his father and mother; Diabetes in his paternal grandmother; Liver disease in his brother; Migraines in his mother; Tourette syndrome in his brother ,  _______________________________________________________________________  Social History:   reports that he has been smoking cigarettes  He started smoking about 20 years ago  He has been smoking about 0 00 packs per day for the past 20 00 years  He has never used smokeless tobacco  He reports current alcohol use  He reports that he does not use drugs  ,  _______________________________________________________________________  Allergies:  has No Known Allergies     _______________________________________________________________________  Current Outpatient Medications   Medication Sig Dispense Refill    omeprazole (PriLOSEC) 40 MG capsule TAKE 1 CAPSULE BY MOUTH EVERY DAY 90 capsule 2    PARoxetine (PAXIL) 10 mg tablet TAKE 1 TABLET BY MOUTH EVERY DAY 90 tablet 2    valsartan (DIOVAN) 80 mg tablet Take 1 tablet (80 mg total) by mouth daily 90 tablet 2     No current facility-administered medications for this visit       _______________________________________________________________________  Review of Systems   Constitutional: Negative for fatigue and unexpected weight change  Respiratory: Negative for cough and shortness of breath  Cardiovascular: Negative for chest pain  Gastrointestinal: Negative for abdominal pain, constipation, diarrhea and vomiting  Musculoskeletal: Negative for arthralgias  Neurological: Negative for dizziness and headaches  Psychiatric/Behavioral: Positive for dysphoric mood  The patient is not nervous/anxious  Objective:  Vitals:    05/04/21 1300 05/04/21 1313   BP: 122/90 116/88   BP Location: Left arm Left arm   Patient Position: Sitting Sitting   Cuff Size: Adult Large   Pulse: 80    Resp: 16    Temp: (!) 97 3 °F (36 3 °C)    TempSrc: Temporal    SpO2: 97%    Weight: 87 1 kg (192 lb)    Height: 5' 8" (1 727 m)      Body mass index is 29 19 kg/m²  Physical Exam  Vitals signs and nursing note reviewed  Constitutional:       Appearance: Normal appearance  He is well-developed and normal weight  Neck:      Musculoskeletal: Normal range of motion and neck supple  Thyroid: No thyromegaly  Cardiovascular:      Rate and Rhythm: Normal rate and regular rhythm  Heart sounds: Normal heart sounds  No murmur  Pulmonary:      Effort: Pulmonary effort is normal  No respiratory distress  Breath sounds: Normal breath sounds  No wheezing  Musculoskeletal:      Right lower leg: No edema  Left lower leg: No edema  Lymphadenopathy:      Cervical: No cervical adenopathy  Neurological:      Mental Status: He is alert and oriented to person, place, and time  Cranial Nerves: No cranial nerve deficit  Psychiatric:         Mood and Affect: Mood normal          Behavior: Behavior normal          Thought Content:  Thought content normal          Judgment: Judgment normal

## 2021-05-04 NOTE — ASSESSMENT & PLAN NOTE
Chol was 269 and LDL was 176 in Jan 2021  Recheck lipids  Advised pt to follow a low cholesterol diet and to exercise on a regular basis

## 2021-05-04 NOTE — ASSESSMENT & PLAN NOTE
BP better  Continue valsartan 80 mg qd  Pt advised to continue low Na diet and to exercise on a regular basis

## 2021-05-22 ENCOUNTER — IMMUNIZATIONS (OUTPATIENT)
Dept: FAMILY MEDICINE CLINIC | Facility: HOSPITAL | Age: 41
End: 2021-05-22

## 2021-05-22 DIAGNOSIS — Z23 ENCOUNTER FOR IMMUNIZATION: Primary | ICD-10-CM

## 2021-05-22 PROCEDURE — 0002A SARS-COV-2 / COVID-19 MRNA VACCINE (PFIZER-BIONTECH) 30 MCG: CPT

## 2021-05-22 PROCEDURE — 91300 SARS-COV-2 / COVID-19 MRNA VACCINE (PFIZER-BIONTECH) 30 MCG: CPT

## 2021-08-01 DIAGNOSIS — F32.A CHRONIC DEPRESSION: ICD-10-CM

## 2021-08-01 RX ORDER — PAROXETINE 10 MG/1
TABLET, FILM COATED ORAL
Qty: 90 TABLET | Refills: 2 | Status: SHIPPED | OUTPATIENT
Start: 2021-08-01

## 2021-08-07 ENCOUNTER — APPOINTMENT (OUTPATIENT)
Dept: LAB | Facility: CLINIC | Age: 41
End: 2021-08-07
Payer: COMMERCIAL

## 2021-08-07 DIAGNOSIS — I10 ESSENTIAL HYPERTENSION: ICD-10-CM

## 2021-08-07 DIAGNOSIS — E78.00 HYPERCHOLESTEROLEMIA: ICD-10-CM

## 2021-08-07 LAB
ALBUMIN SERPL BCP-MCNC: 3.8 G/DL (ref 3.5–5)
ALP SERPL-CCNC: 63 U/L (ref 46–116)
ALT SERPL W P-5'-P-CCNC: 146 U/L (ref 12–78)
ANION GAP SERPL CALCULATED.3IONS-SCNC: 6 MMOL/L (ref 4–13)
AST SERPL W P-5'-P-CCNC: 62 U/L (ref 5–45)
BILIRUB SERPL-MCNC: 0.62 MG/DL (ref 0.2–1)
BUN SERPL-MCNC: 16 MG/DL (ref 5–25)
CALCIUM SERPL-MCNC: 9.1 MG/DL (ref 8.3–10.1)
CHLORIDE SERPL-SCNC: 106 MMOL/L (ref 100–108)
CHOLEST SERPL-MCNC: 238 MG/DL (ref 50–200)
CO2 SERPL-SCNC: 30 MMOL/L (ref 21–32)
CREAT SERPL-MCNC: 1.13 MG/DL (ref 0.6–1.3)
GFR SERPL CREATININE-BSD FRML MDRD: 81 ML/MIN/1.73SQ M
GLUCOSE P FAST SERPL-MCNC: 99 MG/DL (ref 65–99)
HDLC SERPL-MCNC: 42 MG/DL
LDLC SERPL CALC-MCNC: 146 MG/DL (ref 0–100)
NONHDLC SERPL-MCNC: 196 MG/DL
POTASSIUM SERPL-SCNC: 3.7 MMOL/L (ref 3.5–5.3)
PROT SERPL-MCNC: 7.2 G/DL (ref 6.4–8.2)
SODIUM SERPL-SCNC: 142 MMOL/L (ref 136–145)
TRIGL SERPL-MCNC: 251 MG/DL

## 2021-08-07 PROCEDURE — 80061 LIPID PANEL: CPT

## 2021-08-07 PROCEDURE — 80053 COMPREHEN METABOLIC PANEL: CPT

## 2021-08-07 PROCEDURE — 36415 COLL VENOUS BLD VENIPUNCTURE: CPT

## 2021-08-16 ENCOUNTER — OFFICE VISIT (OUTPATIENT)
Dept: FAMILY MEDICINE CLINIC | Facility: CLINIC | Age: 41
End: 2021-08-16
Payer: COMMERCIAL

## 2021-08-16 VITALS
WEIGHT: 195 LBS | HEIGHT: 68 IN | SYSTOLIC BLOOD PRESSURE: 120 MMHG | HEART RATE: 65 BPM | OXYGEN SATURATION: 99 % | DIASTOLIC BLOOD PRESSURE: 80 MMHG | TEMPERATURE: 97.2 F | BODY MASS INDEX: 29.55 KG/M2

## 2021-08-16 DIAGNOSIS — F32.A CHRONIC DEPRESSION: ICD-10-CM

## 2021-08-16 DIAGNOSIS — K21.9 GASTROESOPHAGEAL REFLUX DISEASE, UNSPECIFIED WHETHER ESOPHAGITIS PRESENT: ICD-10-CM

## 2021-08-16 DIAGNOSIS — I10 ESSENTIAL HYPERTENSION: Primary | ICD-10-CM

## 2021-08-16 DIAGNOSIS — E78.00 HYPERCHOLESTEROLEMIA: ICD-10-CM

## 2021-08-16 DIAGNOSIS — R79.89 ABNORMAL LFTS: ICD-10-CM

## 2021-08-16 PROCEDURE — 4004F PT TOBACCO SCREEN RCVD TLK: CPT | Performed by: FAMILY MEDICINE

## 2021-08-16 PROCEDURE — 99214 OFFICE O/P EST MOD 30 MIN: CPT | Performed by: FAMILY MEDICINE

## 2021-08-16 PROCEDURE — 3725F SCREEN DEPRESSION PERFORMED: CPT | Performed by: FAMILY MEDICINE

## 2021-08-16 PROCEDURE — 3008F BODY MASS INDEX DOCD: CPT | Performed by: FAMILY MEDICINE

## 2021-08-16 RX ORDER — VALSARTAN 80 MG/1
80 TABLET ORAL DAILY
Qty: 90 TABLET | Refills: 2 | Status: SHIPPED | OUTPATIENT
Start: 2021-08-16 | End: 2022-02-17

## 2021-08-16 NOTE — ASSESSMENT & PLAN NOTE
Liver enzymes still high in Aug 2021 but lower than last time  Pt saw Dr Rosmery Mcknight in April 2021  Had complete lab work-up and was ok  Etiology is thought to be due to fatty liver  Pt was told to follow low fat diet and to exercise on a regular basis  Next appt with Dr Rosmery Mcknight is in Oct 2021

## 2021-08-16 NOTE — ASSESSMENT & PLAN NOTE
BP good  Continue valsartan 80 mg qd  Pt advised to continue low Na diet and to exercise on a regular basis

## 2021-08-16 NOTE — ASSESSMENT & PLAN NOTE
Chol lower at 236 and LDL lower at 146 in Aug 2021  Advised pt to follow a low cholesterol diet and to exercise on a regular basis  Recheck in 6 months

## 2021-08-16 NOTE — PROGRESS NOTES
Assessment/Plan:         Problem List Items Addressed This Visit        Digestive    GERD (gastroesophageal reflux disease)     No recent symptoms  Continue omeprazole 40 mg qd and GERD diet  Cardiovascular and Mediastinum    Essential hypertension - Primary     BP good  Continue valsartan 80 mg qd  Pt advised to continue low Na diet and to exercise on a regular basis  Relevant Medications    valsartan (DIOVAN) 80 mg tablet    Other Relevant Orders    Comprehensive metabolic panel       Other    Hypercholesterolemia     Chol lower at 236 and LDL lower at 146 in Aug 2021  Advised pt to follow a low cholesterol diet and to exercise on a regular basis  Recheck in 6 months  Relevant Orders    Comprehensive metabolic panel    Lipid panel    Chronic depression     Mood has been ok  Continue paroxetine 10 mg qd  Abnormal LFTs     Liver enzymes still high in Aug 2021 but lower than last time  Pt saw Dr Ronny Peck in April 2021  Had complete lab work-up and was ok  Etiology is thought to be due to fatty liver  Pt was told to follow low fat diet and to exercise on a regular basis  Next appt with Dr Ronny Peck is in Oct 2021  Subjective:      Patient ID: Noemy Mares is a 45 y o  male  Pt here for f/u HTN, HL, GERD, Increased LFTs, Depression  BP has been good  Pt active at work  No recent KEVIN since on med  Pt denies ETOH use and to see Dr Ronny Peck in Oct for f/u  Had labs done recently  No new c/o  The following portions of the patient's history were reviewed and updated as appropriate:   Past Medical History:  He has a past medical history of Depression, Elevated liver enzymes, Fatty liver, Heartburn, Hypertension (4/2021), Psychiatric disorder, and Renal disorder  ,  _______________________________________________________________________  Medical Problems:  does not have any pertinent problems on file ,  _______________________________________________________________________  Past Surgical History:   has a past surgical history that includes Tooth extraction; pr fragment kidney stone/ eswl (Left, 11/4/2016); pr colonoscopy flx dx w/collj spec when pfrmd (N/A, 6/11/2018); Colonoscopy; and Upper gastrointestinal endoscopy  ,  _______________________________________________________________________  Family History:  family history includes Cancer in his father; Crohn's disease in his father; Depression in his father and mother; Diabetes in his paternal grandmother; Liver disease in his brother; Migraines in his mother; Tourette syndrome in his brother ,  _______________________________________________________________________  Social History:   reports that he has been smoking cigarettes  He started smoking about 20 years ago  He has been smoking about 0 00 packs per day for the past 20 00 years  He has never used smokeless tobacco  He reports current alcohol use  He reports that he does not use drugs  ,  _______________________________________________________________________  Allergies:  has No Known Allergies     _______________________________________________________________________  Current Outpatient Medications   Medication Sig Dispense Refill    omeprazole (PriLOSEC) 40 MG capsule TAKE 1 CAPSULE BY MOUTH EVERY DAY 90 capsule 2    PARoxetine (PAXIL) 10 mg tablet TAKE 1 TABLET BY MOUTH EVERY DAY 90 tablet 2    valsartan (DIOVAN) 80 mg tablet Take 1 tablet (80 mg total) by mouth daily 90 tablet 2     No current facility-administered medications for this visit      _______________________________________________________________________  Review of Systems      Objective:  Vitals:    08/16/21 1409   BP: 120/80   Pulse: 65   Temp: (!) 97 2 °F (36 2 °C)   SpO2: 99%   Weight: 88 5 kg (195 lb)   Height: 5' 8" (1 727 m)     Body mass index is 29 65 kg/m²       Physical Exam

## 2021-08-17 DIAGNOSIS — K21.9 GASTROESOPHAGEAL REFLUX DISEASE: ICD-10-CM

## 2021-08-17 RX ORDER — OMEPRAZOLE 40 MG/1
CAPSULE, DELAYED RELEASE ORAL
Qty: 90 CAPSULE | Refills: 2 | Status: SHIPPED | OUTPATIENT
Start: 2021-08-17 | End: 2022-04-28

## 2021-09-15 ENCOUNTER — APPOINTMENT (EMERGENCY)
Dept: CT IMAGING | Facility: HOSPITAL | Age: 41
End: 2021-09-15
Payer: COMMERCIAL

## 2021-09-15 ENCOUNTER — HOSPITAL ENCOUNTER (EMERGENCY)
Facility: HOSPITAL | Age: 41
Discharge: HOME | End: 2021-09-15
Attending: EMERGENCY MEDICINE
Payer: COMMERCIAL

## 2021-09-15 VITALS
HEART RATE: 82 BPM | RESPIRATION RATE: 16 BRPM | DIASTOLIC BLOOD PRESSURE: 84 MMHG | OXYGEN SATURATION: 96 % | TEMPERATURE: 98.6 F | SYSTOLIC BLOOD PRESSURE: 143 MMHG | WEIGHT: 202.38 LBS | HEIGHT: 68 IN | BODY MASS INDEX: 30.67 KG/M2

## 2021-09-15 DIAGNOSIS — R10.11 INTERMITTENT RIGHT UPPER QUADRANT ABDOMINAL PAIN: Primary | ICD-10-CM

## 2021-09-15 LAB
ALBUMIN SERPL BCP-MCNC: 3.7 G/DL (ref 3.5–5)
ALP SERPL-CCNC: 62 U/L (ref 46–116)
ALT SERPL W P-5'-P-CCNC: 143 U/L (ref 12–78)
ANION GAP SERPL CALCULATED.3IONS-SCNC: 7 MMOL/L (ref 4–13)
AST SERPL W P-5'-P-CCNC: 59 U/L (ref 5–45)
BASOPHILS # BLD AUTO: 0.06 THOUSANDS/ΜL (ref 0–0.1)
BASOPHILS NFR BLD AUTO: 1 % (ref 0–1)
BILIRUB DIRECT SERPL-MCNC: 0.09 MG/DL (ref 0–0.2)
BILIRUB SERPL-MCNC: 0.23 MG/DL (ref 0.2–1)
BUN SERPL-MCNC: 17 MG/DL (ref 5–25)
CALCIUM SERPL-MCNC: 9.2 MG/DL (ref 8.3–10.1)
CHLORIDE SERPL-SCNC: 105 MMOL/L (ref 100–108)
CO2 SERPL-SCNC: 28 MMOL/L (ref 21–32)
CREAT SERPL-MCNC: 1.16 MG/DL (ref 0.6–1.3)
EOSINOPHIL # BLD AUTO: 0.19 THOUSAND/ΜL (ref 0–0.61)
EOSINOPHIL NFR BLD AUTO: 4 % (ref 0–6)
ERYTHROCYTE [DISTWIDTH] IN BLOOD BY AUTOMATED COUNT: 12.4 % (ref 11.6–15.1)
GFR SERPL CREATININE-BSD FRML MDRD: 79 ML/MIN/1.73SQ M
GLUCOSE SERPL-MCNC: 152 MG/DL (ref 65–140)
HCT VFR BLD AUTO: 41.9 % (ref 36.5–49.3)
HGB BLD-MCNC: 14.3 G/DL (ref 12–17)
IMM GRANULOCYTES # BLD AUTO: 0.01 THOUSAND/UL (ref 0–0.2)
IMM GRANULOCYTES NFR BLD AUTO: 0 % (ref 0–2)
LIPASE SERPL-CCNC: 121 U/L (ref 73–393)
LYMPHOCYTES # BLD AUTO: 2.29 THOUSANDS/ΜL (ref 0.6–4.47)
LYMPHOCYTES NFR BLD AUTO: 52 % (ref 14–44)
MCH RBC QN AUTO: 29.7 PG (ref 26.8–34.3)
MCHC RBC AUTO-ENTMCNC: 34.1 G/DL (ref 31.4–37.4)
MCV RBC AUTO: 87 FL (ref 82–98)
MONOCYTES # BLD AUTO: 0.32 THOUSAND/ΜL (ref 0.17–1.22)
MONOCYTES NFR BLD AUTO: 7 % (ref 4–12)
NEUTROPHILS # BLD AUTO: 1.58 THOUSANDS/ΜL (ref 1.85–7.62)
NEUTS SEG NFR BLD AUTO: 36 % (ref 43–75)
NRBC BLD AUTO-RTO: 0 /100 WBCS
PLATELET # BLD AUTO: 279 THOUSANDS/UL (ref 149–390)
PMV BLD AUTO: 9.5 FL (ref 8.9–12.7)
POTASSIUM SERPL-SCNC: 3.5 MMOL/L (ref 3.5–5.3)
PROT SERPL-MCNC: 6.6 G/DL (ref 6.4–8.2)
RBC # BLD AUTO: 4.81 MILLION/UL (ref 3.88–5.62)
SODIUM SERPL-SCNC: 140 MMOL/L (ref 136–145)
WBC # BLD AUTO: 4.45 THOUSAND/UL (ref 4.31–10.16)

## 2021-09-15 PROCEDURE — 85025 COMPLETE CBC W/AUTO DIFF WBC: CPT | Performed by: PHYSICIAN ASSISTANT

## 2021-09-15 PROCEDURE — 80076 HEPATIC FUNCTION PANEL: CPT | Performed by: PHYSICIAN ASSISTANT

## 2021-09-15 PROCEDURE — 99284 EMERGENCY DEPT VISIT MOD MDM: CPT

## 2021-09-15 PROCEDURE — 36415 COLL VENOUS BLD VENIPUNCTURE: CPT | Performed by: PHYSICIAN ASSISTANT

## 2021-09-15 PROCEDURE — 96360 HYDRATION IV INFUSION INIT: CPT

## 2021-09-15 PROCEDURE — 83690 ASSAY OF LIPASE: CPT | Performed by: PHYSICIAN ASSISTANT

## 2021-09-15 PROCEDURE — 99284 EMERGENCY DEPT VISIT MOD MDM: CPT | Performed by: PHYSICIAN ASSISTANT

## 2021-09-15 PROCEDURE — 80048 BASIC METABOLIC PNL TOTAL CA: CPT | Performed by: PHYSICIAN ASSISTANT

## 2021-09-15 PROCEDURE — G1004 CDSM NDSC: HCPCS

## 2021-09-15 PROCEDURE — 74177 CT ABD & PELVIS W/CONTRAST: CPT

## 2021-09-15 RX ADMIN — SODIUM CHLORIDE 1000 ML: 0.9 INJECTION, SOLUTION INTRAVENOUS at 04:18

## 2021-09-15 RX ADMIN — IOHEXOL 100 ML: 350 INJECTION, SOLUTION INTRAVENOUS at 05:49

## 2021-09-15 NOTE — DISCHARGE INSTRUCTIONS
You were seen in the emergency department today for right upper quadrant abdominal pain   Laboratory analysis and Radiologic imaging did not reveal any acute abnormalities  Please follow-up with gastroenterology regarding your symptoms  Return to the Emergency Department sooner if increased pain, fever, vomiting, diarrhea, difficulty breathing or urinating, bleeding

## 2021-09-15 NOTE — ED PROVIDER NOTES
History  Chief Complaint   Patient presents with    Abdominal Pain     C/O RUQ abdominal sharp pain since 2230 last night after eating ice cream  Pt denies N/V/D  Last BM yesterday     Bhavesh Sanders is a 45y o  year old male with PMH of nephrolithiasis, transaminitis, hypertension, GERD  who presents to the emergency department with right upper quadrant abdominal pain x5hrs  The patient states he was eating ice cream at approximately 11:30 p m  When he noticed onset of right upper quadrant abdominal pain  Describes the pain as a sharp aching pain  He rates the pain at a 6/10  He noticed that the pain waxed and waned  The patient tolerated the pain throughout work but went to eat his lunch approximately 2:00 a m  And the pain acutely worsened  Since 02/30 this morning the pain is remained constant, 6/10 pain  The patient states he has never had pain like this in the past   He states the last time it abdominal pain it was a kidney stone and this is nothing like that  Nothing seems to make his pain better at this point  Pain is made worse with palpation and certain movements  The abdominal pain is not associated with any nausea, vomiting, diarrhea, fevers, chills  He denies any blood in the stool, blood in the bowl of the toilet  The patient denies any dizziness, headaches, chest pain, shortness of breath, palpitations, abdominal pain, nausea, vomiting, diarrhea, lower extremity pain/swelling/edema  The patient denies any urinary symptoms including frequency, urgency, dysuria, hematuria  The patient states he has never had a colonoscopy  He admits to some social drinking, no more than 3 beverages a week, he smokes approximately 1 cigarette a day, he denies any recreational drug use  He does not not take any medications every day              History provided by:  Patient   used: No    Abdominal Pain  Pain location:  RUQ  Pain quality: aching and sharp    Pain radiates to:  Does not radiate  Pain severity:  Moderate  Onset quality:  Sudden  Duration:  5 hours  Timing:  Constant  Progression:  Waxing and waning  Chronicity:  New  Context: eating    Context: not alcohol use, not awakening from sleep, not diet changes, not laxative use, not medication withdrawal, not previous surgeries, not recent illness, not recent sexual activity, not recent travel, not retching, not sick contacts, not suspicious food intake and not trauma    Relieved by:  Nothing  Worsened by:  Palpation and movement  Ineffective treatments:  None tried  Associated symptoms: no anorexia, no belching, no chest pain, no chills, no constipation, no cough, no diarrhea, no dysuria, no fatigue, no fever, no flatus, no hematemesis, no hematochezia, no hematuria, no melena, no nausea, no shortness of breath, no sore throat and no vomiting    Risk factors: no alcohol abuse, no aspirin use and no NSAID use        Prior to Admission Medications   Prescriptions Last Dose Informant Patient Reported? Taking? PARoxetine (PAXIL) 10 mg tablet 9/14/2021 at 0800  No Yes   Sig: TAKE 1 TABLET BY MOUTH EVERY DAY   omeprazole (PriLOSEC) 40 MG capsule 9/14/2021 at 0800  No Yes   Sig: TAKE 1 CAPSULE BY MOUTH EVERY DAY   valsartan (DIOVAN) 80 mg tablet 9/14/2021 at 0800  No Yes   Sig: Take 1 tablet (80 mg total) by mouth daily      Facility-Administered Medications: None       Past Medical History:   Diagnosis Date    Depression     Elevated liver enzymes     Fatty liver     Heartburn     Hypertension 4/2021    Psychiatric disorder     depression    Renal disorder     stones       Past Surgical History:   Procedure Laterality Date    COLONOSCOPY      [place] COLONOSCOPY FLX DX W/COLLJ SPEC WHEN PFRMD N/A 6/11/2018    Procedure: EGD AND COLONOSCOPY;  Surgeon: Nanette Phillips MD;  [facility];   Service: Gastroenterology    [place] FRAGMENT KIDNEY STONE/ ESWL Left 11/4/2016    Procedure: Redd Glassing SHOCKWAVE (ESWL); Surgeon: Sarah Pimentel MD;  Location: [facility];  Service: Urology    TOOTH EXTRACTION      UPPER GASTROINTESTINAL ENDOSCOPY         Family History   Problem Relation Age of Onset    Migraines Mother     Depression Mother         Duane    Crohn's disease Father     Cancer Father     Depression Father     Liver disease Brother     Tourette syndrome Brother     Diabetes Paternal Grandmother      I have reviewed and agree with the history as documented  E-Cigarette/Vaping    E-Cigarette Use Never User      E-Cigarette/Vaping Substances    Nicotine No     THC No     CBD No     Flavoring No     Other No     Unknown No      Social History     Tobacco Use    Smoking status: Light Tobacco Smoker     Packs/day: 0 25     Years: 20 00     Pack years: 5 00     Types: Cigarettes     Start date: 2001    Smokeless tobacco: Never Used   Vaping Use    Vaping Use: Never used   Substance Use Topics    Alcohol use: Yes     Alcohol/week: 0 0 standard drinks     Comment: socially    Drug use: No       Review of Systems   Constitutional: Negative for chills, fatigue and fever  HENT: Negative for congestion, drooling, ear pain, sinus pain and sore throat  Eyes: Negative for pain and visual disturbance  Respiratory: Negative for apnea, cough, choking, chest tightness, shortness of breath, wheezing and stridor  Cardiovascular: Negative for chest pain and palpitations  Gastrointestinal: Positive for abdominal pain  Negative for abdominal distention, anal bleeding, anorexia, blood in stool, constipation, diarrhea, flatus, hematemesis, hematochezia, melena, nausea and vomiting  Genitourinary: Negative for dysuria, flank pain, frequency, hematuria, penile pain, testicular pain and urgency  Musculoskeletal: Negative for arthralgias, back pain, myalgias and neck pain  Skin: Negative for color change, pallor, rash and wound     Neurological: Negative for dizziness, tremors, seizures, syncope, weakness, light-headedness, numbness and headaches  Psychiatric/Behavioral: Negative for agitation and behavioral problems  All other systems reviewed and are negative  Physical Exam  Physical Exam  Vitals and nursing note reviewed  Constitutional:       General: He is not in acute distress  Appearance: Normal appearance  He is normal weight  He is not ill-appearing or toxic-appearing  HENT:      Head: Normocephalic and atraumatic  Right Ear: Tympanic membrane normal       Left Ear: Tympanic membrane normal       Nose: Nose normal  No congestion or rhinorrhea  Mouth/Throat:      Mouth: Mucous membranes are moist       Pharynx: Oropharynx is clear  No oropharyngeal exudate  Eyes:      General:         Right eye: No discharge  Left eye: No discharge  Extraocular Movements: Extraocular movements intact  Conjunctiva/sclera: Conjunctivae normal       Pupils: Pupils are equal, round, and reactive to light  Cardiovascular:      Rate and Rhythm: Normal rate and regular rhythm  Pulses: Normal pulses  Heart sounds: Normal heart sounds  No murmur heard  No friction rub  No gallop  Pulmonary:      Effort: Pulmonary effort is normal  No respiratory distress  Breath sounds: Normal breath sounds  No stridor  No wheezing, rhonchi or rales  Chest:      Chest wall: No tenderness  Abdominal:      General: Abdomen is flat  Bowel sounds are normal  There is no distension or abdominal bruit  There are no signs of injury  Palpations: Abdomen is soft  There is no hepatomegaly, splenomegaly or mass  Tenderness: There is abdominal tenderness in the right upper quadrant  There is no right CVA tenderness, left CVA tenderness, guarding or rebound  Negative signs include Hurd's sign, Rovsing's sign, McBurney's sign, psoas sign and obturator sign  Hernia: No hernia is present  Musculoskeletal:      Cervical back: Normal range of motion and neck supple   No rigidity or tenderness  Skin:     General: Skin is warm and dry  Capillary Refill: Capillary refill takes less than 2 seconds  Neurological:      General: No focal deficit present  Mental Status: He is alert and oriented to person, place, and time  Mental status is at baseline     Psychiatric:         Mood and Affect: Mood normal          Behavior: Behavior normal          Vital Signs  ED Triage Vitals [09/15/21 0345]   Temperature Pulse Respirations Blood Pressure SpO2   98 6 °F (37 °C) 95 20 144/89 97 %      Temp Source Heart Rate Source Patient Position - Orthostatic VS BP Location FiO2 (%)   Oral Monitor Lying Right arm --      Pain Score       8           Vitals:    09/15/21 0345 09/15/21 0500 09/15/21 0600   BP: 144/89 160/93 143/84   Pulse: 95 86 82   Patient Position - Orthostatic VS: Lying Lying Lying         Visual Acuity      ED Medications  Medications   sodium chloride 0 9 % bolus 1,000 mL (0 mL Intravenous Stopped 9/15/21 0509)   iohexol (OMNIPAQUE) 350 MG/ML injection (SINGLE-DOSE) 100 mL (100 mL Intravenous Given 9/15/21 0549)       Diagnostic Studies  Results Reviewed     Procedure Component Value Units Date/Time    Lipase [[id number]]  (Normal) Collected: 09/15/21 0417    Lab Status: Final result Specimen: Blood from Arm, Right Updated: 09/15/21 0454     Lipase 121 u/L     Basic metabolic panel [[id number]]  (Abnormal) Collected: 09/15/21 0417    Lab Status: Final result Specimen: Blood from Arm, Right Updated: 09/15/21 0454     Sodium 140 mmol/L      Potassium 3 5 mmol/L      Chloride 105 mmol/L      CO2 28 mmol/L      ANION GAP 7 mmol/L      BUN 17 mg/dL      Creatinine 1 16 mg/dL      Glucose 152 mg/dL      Calcium 9 2 mg/dL      eGFR 79 ml/min/1 73sq m     Narrative:      [place] guidelines for Chronic Kidney Disease (CKD):     Stage 1 with normal or high GFR (GFR > 90 mL/min/1 73 square meters)    Stage 2 Mild CKD (GFR = 60-89 mL/min/1 73 square meters)    Stage 3A Moderate CKD (GFR = 45-59 mL/min/1 73 square meters)    Stage 3B Moderate CKD (GFR = 30-44 mL/min/1 73 square meters)    Stage 4 Severe CKD (GFR = 15-29 mL/min/1 73 square meters)    Stage 5 End Stage CKD (GFR <15 mL/min/1 73 square meters)  Note: GFR calculation is accurate only with a steady state creatinine    Hepatic function panel [[id number]]  (Abnormal) Collected: 09/15/21 0417    Lab Status: Final result Specimen: Blood from Arm, Right Updated: 09/15/21 0454     Total Bilirubin 0 23 mg/dL      Bilirubin, Direct 0 09 mg/dL      Alkaline Phosphatase 62 U/L      AST 59 U/L       U/L      Total Protein 6 6 g/dL      Albumin 3 7 g/dL     CBC and differential [[id number]]  (Abnormal) Collected: 09/15/21 0417    Lab Status: Final result Specimen: Blood from Arm, Right Updated: 09/15/21 0432     WBC 4 45 Thousand/uL      RBC 4 81 Million/uL      Hemoglobin 14 3 g/dL      Hematocrit 41 9 %      MCV 87 fL      MCH 29 7 pg      MCHC 34 1 g/dL      RDW 12 4 %      MPV 9 5 fL      Platelets 385 Thousands/uL      nRBC 0 /100 WBCs      Neutrophils Relative 36 %      Immat GRANS % 0 %      Lymphocytes Relative 52 %      Monocytes Relative 7 %      Eosinophils Relative 4 %      Basophils Relative 1 %      Neutrophils Absolute 1 58 Thousands/µL      Immature Grans Absolute 0 01 Thousand/uL      Lymphocytes Absolute 2 29 Thousands/µL      Monocytes Absolute 0 32 Thousand/µL      Eosinophils Absolute 0 19 Thousand/µL      Basophils Absolute 0 06 Thousands/µL                  CT abdomen pelvis with contrast   Final Result by Zhen Gage MD (09/15 6202)      No acute pathology  Contracted gallbladder, with no calculi or inflammatory changes appreciated  Hepatomegaly with fatty infiltration  Stable nonobstructing left renal calculus        Workstation performed: [id number]                    Procedures  Procedures         ED Course  ED Course as of Sep 15 0621   Wed Sep 15, 2021   4023 Patient declining antiemetics/pain medication at this time, will reassess frequently  0455 AST/ALT mildly elevated but this is chronic and followed outpatient- baseline       0505 Patient reassessed, he states pain is actually improved without intervention  Updated on current lab results, frequent reassessment, will update with CT scan results       0606 Patient reassessed  All pain has resolved at this point  Updated on current Ct results  Patient states he would like to go home and follow-up outpatient, feel this is appropriate, will discharge  MDM  Number of Diagnoses or Management Options  Intermittent right upper quadrant abdominal pain: new and requires workup  Diagnosis management comments: Patient was seen and examined  in the emergency department for chief complaint of abdominal pain  The patient presented with approximately 5 hours right upper quadrant abdominal pain  Pain waxes and wanes  He had a fatty meal today  No fevers, chills, nausea, vomiting, diarrhea, jaundice  On exam no tenderness to palpation right upper quadrant, negative Hurd signs, no other peritoneal signs  Cardiovascular regular rate rhythm, no murmurs rubs or gallops good auscultation bilaterally no evidence of breath sounds  No reported urinary symptoms  No lower extremity symptoms appeared      Initial differential includes but is not limited to:  Cholelithiasis choledocholithiasis cholangitis cholecystitis, hepatitis, appendicitis, gastroenteritis, gastritis, PUD, GERD, gastroparesis, hepatitis, pancreatitis, colitis, enteritis, food poisoning, mesenteric adenitis, IBD, IBS, ileus, bowel obstruction, volvulus, biliary colic, perforated viscus, tumor, splenic etiology, constipation, diverticulitis, or internal hernia  Workup: Will obtain CBC, BMP, lipase, hepatic function panel, CT of the abdomen pelvis being that ultrasound unavailable  Workup generally unremarkable and reassuring  Mild elevation of liver enzymes however this is chronic  No signs of infection  Exam unremarkable  CT of the abdomen pelvis not reveal stones the contracted gallbladder  Disposition:  General impression 81-year-old male with likely biliary colic  No stones visualized in the gallbladder  No infectious signs at this time  No liver involvement  The time after CT scan of all symptoms had resolved without intervention  Patient requesting discharge at this time  Patient discharged with Gastroenterology follow-up  The patient was discharged in stable condition  Patient ambulated off the department  Extensive return to emergency department precautions were discussed  Follow up with appropriate providers including primary care physician was discussed  Patient and/or their  primary decision maker expressed understanding  Patient remained stable during entire emergency department stay  Amount and/or Complexity of Data Reviewed  Clinical lab tests: ordered and reviewed  Tests in the radiology section of CPT®: ordered and reviewed  Review and summarize past medical records: yes  Independent visualization of images, tracings, or specimens: yes    Risk of Complications, Morbidity, and/or Mortality  Presenting problems: moderate  Diagnostic procedures: moderate  Management options: moderate    Patient Progress  Patient progress: stable      Disposition  Final diagnoses:   Intermittent right upper quadrant abdominal pain     Time reflects when diagnosis was documented in both MDM as applicable and the Disposition within this note     Time User Action Codes Description Comment    9/15/2021  6:07 AM Merritt Martinez Add [R10 11] Intermittent right upper quadrant abdominal pain       ED Disposition     ED Disposition Condition Date/Time Comment    Discharge Stable Wed Sep 15, 2021  6:07 AM Britni Enciso discharge to home/self care              Follow-up Information     Follow up With Specialties Details Why Contact Info Additional Information    Henri Ruvalcaba MD Family Medicine Go to  As needed, If symptoms worsen [address]  [place] [address]  [phone]       [facility] Emergency Medicine Go to  As needed, If symptoms worsen [address] [facility] [address] Emergency Department, [address] [facility], [address]    Kandi Jose MD Gastroenterology Schedule an appointment as soon as possible for a visit  For follow-up of your right upper quadrent abdominal pain  [facility] [address] [id number]  [phone]             Discharge Medication List as of 9/15/2021  6:11 AM      CONTINUE these medications which have NOT CHANGED    Details   omeprazole (PriLOSEC) 40 MG capsule TAKE 1 CAPSULE BY MOUTH EVERY DAY, Normal      PARoxetine (PAXIL) 10 mg tablet TAKE 1 TABLET BY MOUTH EVERY DAY, Normal      valsartan (DIOVAN) 80 mg tablet Take 1 tablet (80 mg total) by mouth daily, Starting Mon 8/16/2021, Normal           No discharge procedures on file      PDMP Review       Value Time User    PDMP Reviewed  Yes 9/15/2021  3:39 AM Eb Ashby MD          ED Provider  Electronically Signed by           Alison Chavarria PA-C  09/15/21 8597

## 2021-10-05 ENCOUNTER — OFFICE VISIT (OUTPATIENT)
Dept: GASTROENTEROLOGY | Facility: CLINIC | Age: 41
End: 2021-10-05
Payer: COMMERCIAL

## 2021-10-05 VITALS
HEART RATE: 73 BPM | SYSTOLIC BLOOD PRESSURE: 157 MMHG | DIASTOLIC BLOOD PRESSURE: 93 MMHG | HEIGHT: 68 IN | BODY MASS INDEX: 30.01 KG/M2 | WEIGHT: 198 LBS

## 2021-10-05 DIAGNOSIS — R10.10 PAIN OF UPPER ABDOMEN: Primary | ICD-10-CM

## 2021-10-05 DIAGNOSIS — K76.9 LIVER LESION, LEFT LOBE: ICD-10-CM

## 2021-10-05 DIAGNOSIS — K21.9 GASTROESOPHAGEAL REFLUX DISEASE WITHOUT ESOPHAGITIS: ICD-10-CM

## 2021-10-05 DIAGNOSIS — R16.0 HEPATOMEGALY: ICD-10-CM

## 2021-10-05 DIAGNOSIS — K76.0 FATTY LIVER: ICD-10-CM

## 2021-10-05 DIAGNOSIS — K21.9 GASTROESOPHAGEAL REFLUX DISEASE, UNSPECIFIED WHETHER ESOPHAGITIS PRESENT: ICD-10-CM

## 2021-10-05 DIAGNOSIS — R79.89 ABNORMAL LFTS: ICD-10-CM

## 2021-10-05 PROCEDURE — 3008F BODY MASS INDEX DOCD: CPT | Performed by: INTERNAL MEDICINE

## 2021-10-05 PROCEDURE — 4004F PT TOBACCO SCREEN RCVD TLK: CPT | Performed by: INTERNAL MEDICINE

## 2021-10-05 PROCEDURE — 99214 OFFICE O/P EST MOD 30 MIN: CPT | Performed by: INTERNAL MEDICINE

## 2021-10-06 ENCOUNTER — TELEPHONE (OUTPATIENT)
Dept: GASTROENTEROLOGY | Facility: HOSPITAL | Age: 41
End: 2021-10-06

## 2021-10-21 ENCOUNTER — TELEPHONE (OUTPATIENT)
Dept: GASTROENTEROLOGY | Facility: HOSPITAL | Age: 41
End: 2021-10-21

## 2021-10-22 ENCOUNTER — ANESTHESIA (OUTPATIENT)
Dept: GASTROENTEROLOGY | Facility: HOSPITAL | Age: 41
End: 2021-10-22

## 2021-10-22 ENCOUNTER — ANESTHESIA EVENT (OUTPATIENT)
Dept: GASTROENTEROLOGY | Facility: HOSPITAL | Age: 41
End: 2021-10-22

## 2021-10-22 ENCOUNTER — HOSPITAL ENCOUNTER (OUTPATIENT)
Dept: GASTROENTEROLOGY | Facility: HOSPITAL | Age: 41
Setting detail: OUTPATIENT SURGERY
Discharge: HOME | End: 2021-10-22
Attending: INTERNAL MEDICINE | Admitting: INTERNAL MEDICINE
Payer: COMMERCIAL

## 2021-10-22 VITALS
HEART RATE: 79 BPM | BODY MASS INDEX: 30.16 KG/M2 | OXYGEN SATURATION: 95 % | SYSTOLIC BLOOD PRESSURE: 128 MMHG | DIASTOLIC BLOOD PRESSURE: 79 MMHG | WEIGHT: 199 LBS | RESPIRATION RATE: 12 BRPM | TEMPERATURE: 97.7 F | HEIGHT: 68 IN

## 2021-10-22 DIAGNOSIS — R10.10 PAIN OF UPPER ABDOMEN: ICD-10-CM

## 2021-10-22 DIAGNOSIS — K21.9 GASTROESOPHAGEAL REFLUX DISEASE WITHOUT ESOPHAGITIS: ICD-10-CM

## 2021-10-22 PROCEDURE — 43239 EGD BIOPSY SINGLE/MULTIPLE: CPT | Performed by: INTERNAL MEDICINE

## 2021-10-22 PROCEDURE — 88305 TISSUE EXAM BY PATHOLOGIST: CPT | Performed by: SPECIALIST

## 2021-10-22 RX ORDER — PROPOFOL 10 MG/ML
INJECTION, EMULSION INTRAVENOUS AS NEEDED
Status: DISCONTINUED | OUTPATIENT
Start: 2021-10-22 | End: 2021-10-22

## 2021-10-22 RX ORDER — SODIUM CHLORIDE, SODIUM LACTATE, POTASSIUM CHLORIDE, CALCIUM CHLORIDE 600; 310; 30; 20 MG/100ML; MG/100ML; MG/100ML; MG/100ML
INJECTION, SOLUTION INTRAVENOUS CONTINUOUS PRN
Status: DISCONTINUED | OUTPATIENT
Start: 2021-10-22 | End: 2021-10-22

## 2021-10-22 RX ORDER — LIDOCAINE HYDROCHLORIDE 10 MG/ML
INJECTION, SOLUTION EPIDURAL; INFILTRATION; INTRACAUDAL; PERINEURAL AS NEEDED
Status: DISCONTINUED | OUTPATIENT
Start: 2021-10-22 | End: 2021-10-22

## 2021-10-22 RX ADMIN — PROPOFOL 100 MG: 10 INJECTION, EMULSION INTRAVENOUS at 10:12

## 2021-10-22 RX ADMIN — LIDOCAINE HYDROCHLORIDE 100 MG: 10 INJECTION, SOLUTION EPIDURAL; INFILTRATION; INTRACAUDAL; PERINEURAL at 10:09

## 2021-10-22 RX ADMIN — PROPOFOL 100 MG: 10 INJECTION, EMULSION INTRAVENOUS at 10:09

## 2021-10-22 RX ADMIN — SODIUM CHLORIDE, SODIUM LACTATE, POTASSIUM CHLORIDE, AND CALCIUM CHLORIDE: .6; .31; .03; .02 INJECTION, SOLUTION INTRAVENOUS at 10:02

## 2021-12-01 ENCOUNTER — TELEMEDICINE (OUTPATIENT)
Dept: FAMILY MEDICINE CLINIC | Facility: CLINIC | Age: 41
End: 2021-12-01
Payer: COMMERCIAL

## 2021-12-01 VITALS — WEIGHT: 200 LBS | HEIGHT: 68 IN | BODY MASS INDEX: 30.31 KG/M2

## 2021-12-01 DIAGNOSIS — K52.9 GASTROENTERITIS: Primary | ICD-10-CM

## 2021-12-01 PROCEDURE — 3008F BODY MASS INDEX DOCD: CPT | Performed by: FAMILY MEDICINE

## 2021-12-01 PROCEDURE — 99213 OFFICE O/P EST LOW 20 MIN: CPT | Performed by: FAMILY MEDICINE

## 2021-12-01 PROCEDURE — 4004F PT TOBACCO SCREEN RCVD TLK: CPT | Performed by: FAMILY MEDICINE

## 2021-12-03 ENCOUNTER — HOSPITAL ENCOUNTER (EMERGENCY)
Facility: HOSPITAL | Age: 41
Discharge: HOME | End: 2021-12-03
Attending: EMERGENCY MEDICINE | Admitting: EMERGENCY MEDICINE
Payer: COMMERCIAL

## 2021-12-03 ENCOUNTER — TELEPHONE (OUTPATIENT)
Dept: FAMILY MEDICINE CLINIC | Facility: CLINIC | Age: 41
End: 2021-12-03

## 2021-12-03 ENCOUNTER — APPOINTMENT (EMERGENCY)
Dept: CT IMAGING | Facility: HOSPITAL | Age: 41
End: 2021-12-03
Payer: COMMERCIAL

## 2021-12-03 VITALS
SYSTOLIC BLOOD PRESSURE: 146 MMHG | DIASTOLIC BLOOD PRESSURE: 94 MMHG | RESPIRATION RATE: 18 BRPM | OXYGEN SATURATION: 98 % | TEMPERATURE: 97.8 F | HEART RATE: 91 BPM

## 2021-12-03 DIAGNOSIS — R10.9 ABDOMINAL PAIN: Primary | ICD-10-CM

## 2021-12-03 DIAGNOSIS — K76.0 HEPATIC STEATOSIS: ICD-10-CM

## 2021-12-03 DIAGNOSIS — R74.01 TRANSAMINITIS: ICD-10-CM

## 2021-12-03 LAB
ALBUMIN SERPL BCP-MCNC: 4 G/DL (ref 3.5–5)
ALP SERPL-CCNC: 65 U/L (ref 46–116)
ALT SERPL W P-5'-P-CCNC: 230 U/L (ref 12–78)
ANION GAP SERPL CALCULATED.3IONS-SCNC: 9 MMOL/L (ref 4–13)
AST SERPL W P-5'-P-CCNC: 123 U/L (ref 5–45)
BASOPHILS # BLD AUTO: 0.04 THOUSANDS/ΜL (ref 0–0.1)
BASOPHILS NFR BLD AUTO: 1 % (ref 0–1)
BILIRUB SERPL-MCNC: 0.58 MG/DL (ref 0.2–1)
BILIRUB UR QL STRIP: NEGATIVE
BUN SERPL-MCNC: 11 MG/DL (ref 5–25)
CALCIUM SERPL-MCNC: 9.3 MG/DL (ref 8.3–10.1)
CHLORIDE SERPL-SCNC: 103 MMOL/L (ref 100–108)
CLARITY UR: CLEAR
CO2 SERPL-SCNC: 23 MMOL/L (ref 21–32)
COLOR UR: YELLOW
CREAT SERPL-MCNC: 0.96 MG/DL (ref 0.6–1.3)
EOSINOPHIL # BLD AUTO: 0.07 THOUSAND/ΜL (ref 0–0.61)
EOSINOPHIL NFR BLD AUTO: 1 % (ref 0–6)
ERYTHROCYTE [DISTWIDTH] IN BLOOD BY AUTOMATED COUNT: 12.6 % (ref 11.6–15.1)
GFR SERPL CREATININE-BSD FRML MDRD: 98 ML/MIN/1.73SQ M
GLUCOSE SERPL-MCNC: 101 MG/DL (ref 65–140)
GLUCOSE UR STRIP-MCNC: NEGATIVE MG/DL
HCT VFR BLD AUTO: 48.4 % (ref 36.5–49.3)
HGB BLD-MCNC: 16.2 G/DL (ref 12–17)
HGB UR QL STRIP.AUTO: NEGATIVE
IMM GRANULOCYTES # BLD AUTO: 0.01 THOUSAND/UL (ref 0–0.2)
IMM GRANULOCYTES NFR BLD AUTO: 0 % (ref 0–2)
KETONES UR STRIP-MCNC: NEGATIVE MG/DL
LEUKOCYTE ESTERASE UR QL STRIP: NEGATIVE
LIPASE SERPL-CCNC: 127 U/L (ref 73–393)
LYMPHOCYTES # BLD AUTO: 1.87 THOUSANDS/ΜL (ref 0.6–4.47)
LYMPHOCYTES NFR BLD AUTO: 38 % (ref 14–44)
MCH RBC QN AUTO: 29.6 PG (ref 26.8–34.3)
MCHC RBC AUTO-ENTMCNC: 33.5 G/DL (ref 31.4–37.4)
MCV RBC AUTO: 88 FL (ref 82–98)
MONOCYTES # BLD AUTO: 0.47 THOUSAND/ΜL (ref 0.17–1.22)
MONOCYTES NFR BLD AUTO: 9 % (ref 4–12)
NEUTROPHILS # BLD AUTO: 2.52 THOUSANDS/ΜL (ref 1.85–7.62)
NEUTS SEG NFR BLD AUTO: 51 % (ref 43–75)
NITRITE UR QL STRIP: NEGATIVE
NRBC BLD AUTO-RTO: 0 /100 WBCS
PH UR STRIP.AUTO: 6 [PH] (ref 4.5–8)
PLATELET # BLD AUTO: 282 THOUSANDS/UL (ref 149–390)
PMV BLD AUTO: 9.3 FL (ref 8.9–12.7)
POTASSIUM SERPL-SCNC: 4.6 MMOL/L (ref 3.5–5.3)
PROT SERPL-MCNC: 7.7 G/DL (ref 6.4–8.2)
PROT UR STRIP-MCNC: NEGATIVE MG/DL
RBC # BLD AUTO: 5.48 MILLION/UL (ref 3.88–5.62)
SODIUM SERPL-SCNC: 135 MMOL/L (ref 136–145)
SP GR UR STRIP.AUTO: 1.02 (ref 1–1.03)
UROBILINOGEN UR QL STRIP.AUTO: 0.2 E.U./DL
WBC # BLD AUTO: 4.98 THOUSAND/UL (ref 4.31–10.16)

## 2021-12-03 PROCEDURE — 74177 CT ABD & PELVIS W/CONTRAST: CPT

## 2021-12-03 PROCEDURE — 81003 URINALYSIS AUTO W/O SCOPE: CPT

## 2021-12-03 PROCEDURE — 99284 EMERGENCY DEPT VISIT MOD MDM: CPT | Performed by: EMERGENCY MEDICINE

## 2021-12-03 PROCEDURE — 99284 EMERGENCY DEPT VISIT MOD MDM: CPT

## 2021-12-03 PROCEDURE — 85025 COMPLETE CBC W/AUTO DIFF WBC: CPT | Performed by: EMERGENCY MEDICINE

## 2021-12-03 PROCEDURE — G1004 CDSM NDSC: HCPCS

## 2021-12-03 PROCEDURE — 36415 COLL VENOUS BLD VENIPUNCTURE: CPT

## 2021-12-03 PROCEDURE — 83690 ASSAY OF LIPASE: CPT | Performed by: EMERGENCY MEDICINE

## 2021-12-03 PROCEDURE — 80053 COMPREHEN METABOLIC PANEL: CPT | Performed by: EMERGENCY MEDICINE

## 2021-12-03 RX ORDER — SODIUM CHLORIDE 9 MG/ML
125 INJECTION, SOLUTION INTRAVENOUS CONTINUOUS
Status: DISCONTINUED | OUTPATIENT
Start: 2021-12-03 | End: 2021-12-03 | Stop reason: HOSPADM

## 2021-12-03 RX ORDER — DICYCLOMINE HCL 20 MG
20 TABLET ORAL EVERY 6 HOURS PRN
Qty: 30 TABLET | Refills: 0 | Status: SHIPPED | OUTPATIENT
Start: 2021-12-03 | End: 2021-12-07 | Stop reason: SDUPTHER

## 2021-12-03 RX ORDER — DICYCLOMINE HCL 20 MG
20 TABLET ORAL ONCE
Status: COMPLETED | OUTPATIENT
Start: 2021-12-03 | End: 2021-12-03

## 2021-12-03 RX ADMIN — IOHEXOL 100 ML: 350 INJECTION, SOLUTION INTRAVENOUS at 15:10

## 2021-12-03 RX ADMIN — SODIUM CHLORIDE 125 ML/HR: 0.9 INJECTION, SOLUTION INTRAVENOUS at 14:53

## 2021-12-03 RX ADMIN — DICYCLOMINE HYDROCHLORIDE 20 MG: 20 TABLET ORAL at 15:19

## 2021-12-06 ENCOUNTER — TELEPHONE (OUTPATIENT)
Dept: GASTROENTEROLOGY | Facility: CLINIC | Age: 41
End: 2021-12-06

## 2021-12-15 ENCOUNTER — HOSPITAL ENCOUNTER (OUTPATIENT)
Dept: RADIOLOGY | Facility: HOSPITAL | Age: 41
Discharge: HOME | End: 2021-12-15
Attending: INTERNAL MEDICINE
Payer: COMMERCIAL

## 2021-12-15 DIAGNOSIS — R79.89 ABNORMAL LFTS: ICD-10-CM

## 2021-12-15 DIAGNOSIS — K76.9 LIVER LESION, LEFT LOBE: ICD-10-CM

## 2021-12-15 DIAGNOSIS — R10.10 PAIN OF UPPER ABDOMEN: ICD-10-CM

## 2021-12-15 PROCEDURE — 74181 MRI ABDOMEN W/O CONTRAST: CPT

## 2021-12-15 PROCEDURE — G1004 CDSM NDSC: HCPCS

## 2021-12-16 DIAGNOSIS — R10.9 ABDOMINAL PAIN: ICD-10-CM

## 2021-12-20 ENCOUNTER — TELEPHONE (OUTPATIENT)
Dept: GASTROENTEROLOGY | Facility: CLINIC | Age: 41
End: 2021-12-20

## 2021-12-21 RX ORDER — DICYCLOMINE HCL 20 MG
20 TABLET ORAL 2 TIMES DAILY PRN
Qty: 30 TABLET | Refills: 3 | Status: SHIPPED | OUTPATIENT
Start: 2021-12-21 | End: 2021-12-29

## 2021-12-23 ENCOUNTER — TELEPHONE (OUTPATIENT)
Dept: GASTROENTEROLOGY | Facility: CLINIC | Age: 41
End: 2021-12-23

## 2021-12-23 DIAGNOSIS — K76.9 LIVER LESION, LEFT LOBE: Primary | ICD-10-CM

## 2021-12-29 DIAGNOSIS — R10.9 ABDOMINAL PAIN: ICD-10-CM

## 2021-12-29 RX ORDER — DICYCLOMINE HCL 20 MG
20 TABLET ORAL 2 TIMES DAILY PRN
Qty: 30 TABLET | Refills: 3 | Status: SHIPPED | OUTPATIENT
Start: 2021-12-29 | End: 2022-01-12

## 2022-01-31 ENCOUNTER — TELEPHONE (OUTPATIENT)
Dept: FAMILY MEDICINE CLINIC | Facility: CLINIC | Age: 42
End: 2022-01-31

## 2022-01-31 NOTE — TELEPHONE ENCOUNTER
Patient called stating that he was having some stomach issues today and yesterday and is asking if he can get a note for work for yesterday and today   Please call and email the note when completed

## 2022-02-17 ENCOUNTER — OFFICE VISIT (OUTPATIENT)
Dept: FAMILY MEDICINE CLINIC | Facility: CLINIC | Age: 42
End: 2022-02-17
Payer: COMMERCIAL

## 2022-02-17 VITALS
HEART RATE: 78 BPM | SYSTOLIC BLOOD PRESSURE: 126 MMHG | OXYGEN SATURATION: 97 % | HEIGHT: 68 IN | RESPIRATION RATE: 16 BRPM | TEMPERATURE: 97.6 F | DIASTOLIC BLOOD PRESSURE: 88 MMHG | BODY MASS INDEX: 30.31 KG/M2 | WEIGHT: 200 LBS

## 2022-02-17 DIAGNOSIS — E78.00 HYPERCHOLESTEROLEMIA: ICD-10-CM

## 2022-02-17 DIAGNOSIS — R79.89 ABNORMAL LFTS: ICD-10-CM

## 2022-02-17 DIAGNOSIS — K21.9 GASTROESOPHAGEAL REFLUX DISEASE WITHOUT ESOPHAGITIS: ICD-10-CM

## 2022-02-17 DIAGNOSIS — I10 ESSENTIAL HYPERTENSION: Primary | ICD-10-CM

## 2022-02-17 DIAGNOSIS — F32.A CHRONIC DEPRESSION: ICD-10-CM

## 2022-02-17 PROBLEM — K52.9 GASTROENTERITIS: Status: RESOLVED | Noted: 2021-12-01 | Resolved: 2022-02-17

## 2022-02-17 PROCEDURE — 99214 OFFICE O/P EST MOD 30 MIN: CPT | Performed by: FAMILY MEDICINE

## 2022-02-17 RX ORDER — VALSARTAN 160 MG/1
160 TABLET ORAL DAILY
Qty: 90 TABLET | Refills: 3 | Status: SHIPPED | OUTPATIENT
Start: 2022-02-17

## 2022-02-17 NOTE — ASSESSMENT & PLAN NOTE
BP has been borderline  Continue valsartan but increase to 160 mg qd  Pt advised to continue low Na diet and to exercise on a regular basis

## 2022-02-17 NOTE — ASSESSMENT & PLAN NOTE
Liver enzymes still higher in Dec 2021  Pt saw Dr Denilson Hardin in Oct 2021  Had complete lab work-up in past and was ok  Etiology is thought to be due to fatty liver  Pt was told to follow low fat diet and to exercise on a regular basis  Pt had MRI in Dec 2021 and has severe fatty liver and right lobe lesion  Will repeat MRI in 3-6 mths

## 2022-02-17 NOTE — PROGRESS NOTES
BMI Counseling: Body mass index is 30 41 kg/m²  The BMI is above normal  Nutrition recommendations include decreasing portion sizes, encouraging healthy choices of fruits and vegetables, consuming healthier snacks and moderation in carbohydrate intake  Exercise recommendations include exercising 3-5 times per week  No pharmacotherapy was ordered  Rationale for BMI follow-up plan is due to patient being overweight or obese  Assessment/Plan:         Problem List Items Addressed This Visit        Digestive    GERD (gastroesophageal reflux disease)     No recent symptoms  Continue omeprazole 40 mg qd and GERD diet  Pt had EGD in Oct 2021 by Dr Vinicius Morris and was ok  Cardiovascular and Mediastinum    Essential hypertension - Primary     BP has been good  Continue valsartan 80 mg qd  Pt advised to continue low Na diet and to exercise on a regular basis  Other    Hypercholesterolemia     Recheck lipids  Advised pt to follow a low cholesterol diet and to exercise on a regular basis  Recheck in 6 months  Chronic depression     Mood has been ok  Continue paroxetine 10 mg qd  Abnormal LFTs     Liver enzymes still higher in Dec 2021  Pt saw Dr Vinicius Morris in Oct 2021  Had complete lab work-up in past and was ok  Etiology is thought to be due to fatty liver  Pt was told to follow low fat diet and to exercise on a regular basis  Pt had MRI in Dec 2021 and has severe fatty liver and right lobe lesion  Will repeat MRI in 3-6 mths  Subjective:      Patient ID: Radha Vu is a 37 y o  male  Pt here for f/u HTN, HL, GERD, Elevated LFTs, Chronic depression  Doing ok  No cp/sob  Gets headaches at times  Pt had EGD in Oct 2021 and was ok  Had CT and MRI of abd in Dec 2021  Pt found to have lesion right lobe of liver  Going to have f/u MRI  Sees Dr Vinicius Morris  Eating healthier and walks         The following portions of the patient's history were reviewed and updated as appropriate: Past Medical History:  He has a past medical history of Depression, Elevated liver enzymes, Fatty liver, Heartburn, Hypertension (4/2021), Psychiatric disorder, and Renal disorder  ,  _______________________________________________________________________  Medical Problems:  does not have any pertinent problems on file ,  _______________________________________________________________________  Past Surgical History:   has a past surgical history that includes Tooth extraction; pr fragment kidney stone/ eswl (Left, 11/4/2016); pr colonoscopy flx dx w/collj spec when pfrmd (N/A, 6/11/2018); Colonoscopy; and Upper gastrointestinal endoscopy  ,  _______________________________________________________________________  Family History:  family history includes Cancer in his father; Crohn's disease in his father; Depression in his father and mother; Diabetes in his paternal grandmother; Liver disease in his brother; Migraines in his mother; Tourette syndrome in his brother ,  _______________________________________________________________________  Social History:   reports that he has been smoking cigarettes  He started smoking about 21 years ago  He has a 5 00 pack-year smoking history  He has never used smokeless tobacco  He reports current alcohol use  He reports that he does not use drugs  ,  _______________________________________________________________________  Allergies:  has No Known Allergies     _______________________________________________________________________  Current Outpatient Medications   Medication Sig Dispense Refill    dicyclomine (BENTYL) 20 mg tablet TAKE 1 TABLET (20 MG TOTAL) BY MOUTH 2 (TWO) TIMES A DAY AS NEEDED (ABDOMINAL CRAMPING) 30 tablet 9    omeprazole (PriLOSEC) 40 MG capsule TAKE 1 CAPSULE BY MOUTH EVERY DAY 90 capsule 2    PARoxetine (PAXIL) 10 mg tablet TAKE 1 TABLET BY MOUTH EVERY DAY 90 tablet 2    valsartan (DIOVAN) 80 mg tablet Take 1 tablet (80 mg total) by mouth daily 90 tablet 2     No current facility-administered medications for this visit      _______________________________________________________________________  Review of Systems   Constitutional: Negative for fatigue and unexpected weight change  Respiratory: Negative for cough and shortness of breath  Cardiovascular: Negative for chest pain  Gastrointestinal: Positive for abdominal pain  Negative for constipation, diarrhea and vomiting  Musculoskeletal: Negative for arthralgias  Neurological: Negative for dizziness and headaches  Psychiatric/Behavioral: Negative for dysphoric mood  The patient is not nervous/anxious  Objective:  Vitals:    02/17/22 1356   BP: 138/92   BP Location: Left arm   Patient Position: Sitting   Cuff Size: Standard   Pulse: 78   Resp: 16   Temp: 97 6 °F (36 4 °C)   TempSrc: Temporal   SpO2: 97%   Weight: 90 7 kg (200 lb)   Height: 5' 8" (1 727 m)     Body mass index is 30 41 kg/m²  Physical Exam  Vitals and nursing note reviewed  Constitutional:       Appearance: Normal appearance  He is well-developed and normal weight  Neck:      Thyroid: No thyromegaly  Cardiovascular:      Rate and Rhythm: Normal rate and regular rhythm  Heart sounds: Normal heart sounds  No murmur heard  Pulmonary:      Effort: Pulmonary effort is normal  No respiratory distress  Breath sounds: Normal breath sounds  No wheezing  Abdominal:      General: Abdomen is flat  Palpations: Abdomen is soft  Tenderness: There is no abdominal tenderness  Musculoskeletal:      Cervical back: Normal range of motion and neck supple  Right lower leg: No edema  Left lower leg: No edema  Lymphadenopathy:      Cervical: No cervical adenopathy  Neurological:      Mental Status: He is alert and oriented to person, place, and time  Cranial Nerves: No cranial nerve deficit     Psychiatric:         Mood and Affect: Mood normal          Behavior: Behavior normal  Thought Content:  Thought content normal          Judgment: Judgment normal

## 2022-02-17 NOTE — ASSESSMENT & PLAN NOTE
No recent symptoms  Continue omeprazole 40 mg qd and GERD diet  Pt had EGD in Oct 2021 by Dr Amy Early and was ok

## 2022-02-22 ENCOUNTER — OFFICE VISIT (OUTPATIENT)
Dept: GASTROENTEROLOGY | Facility: CLINIC | Age: 42
End: 2022-02-22
Payer: COMMERCIAL

## 2022-02-22 VITALS
HEIGHT: 68 IN | BODY MASS INDEX: 30.71 KG/M2 | HEART RATE: 78 BPM | WEIGHT: 202.6 LBS | SYSTOLIC BLOOD PRESSURE: 142 MMHG | DIASTOLIC BLOOD PRESSURE: 95 MMHG

## 2022-02-22 DIAGNOSIS — K44.9 HIATAL HERNIA: ICD-10-CM

## 2022-02-22 DIAGNOSIS — K76.9 LIVER LESION: ICD-10-CM

## 2022-02-22 DIAGNOSIS — K76.0 FATTY LIVER: Primary | ICD-10-CM

## 2022-02-22 DIAGNOSIS — K21.9 GASTROESOPHAGEAL REFLUX DISEASE WITHOUT ESOPHAGITIS: ICD-10-CM

## 2022-02-22 DIAGNOSIS — R10.10 UPPER ABDOMINAL PAIN: ICD-10-CM

## 2022-02-22 DIAGNOSIS — R16.0 HEPATOMEGALY: ICD-10-CM

## 2022-02-22 PROCEDURE — 99214 OFFICE O/P EST MOD 30 MIN: CPT | Performed by: INTERNAL MEDICINE

## 2022-02-22 PROCEDURE — 3008F BODY MASS INDEX DOCD: CPT | Performed by: INTERNAL MEDICINE

## 2022-02-22 PROCEDURE — 4004F PT TOBACCO SCREEN RCVD TLK: CPT | Performed by: INTERNAL MEDICINE

## 2022-02-22 NOTE — PROGRESS NOTES
[facility] - Outpatient Follow-up Note  Moon Rudd 37 y o  male MRN: [id number]  Encounter: [id number]          ASSESSMENT AND PLAN:      1  Fatty liver  -     MRI abdomen w wo contrast; Future; Expected date: 06/01/2022  -     Basic metabolic panel; Future    2  Hepatomegaly  -     MRI abdomen w wo contrast; Future; Expected date: 06/01/2022    3  Liver lesion  -     MRI abdomen w wo contrast; Future; Expected date: 06/01/2022    4  Gastroesophageal reflux disease without esophagitis    5  Hiatal hernia    6  Upper abdominal pain  -     MRI abdomen w wo contrast; Future; Expected date: 06/01/2022      History of elevated LFTs, AST ALT of 123-230 respectively, GGT was elevated as well  In the past extensive evaluation to include hepatitis BC, CARMEN SMA ceruloplasmin alpha-1 antitrypsin levels, AMA levels all unrevealing  Most likely fatty liver, encouraged him to watch his diet and lose some weight, repeat and labs and follow-up with me in 6 months  5 mm lesion in the liver most likely nonspecific, radiologist recommended a follow-up MRI, to be done 6 months in June  ______________________________________________________________________    SUBJECTIVE:     Patient came in for follow-up of history of elevated LFTs, his clinically doing well, denies any nausea vomiting abdominal pain chest pain shortness of breath dysphagia coughing choking spells, has occasional heartburn indigestion  Denies any blood in stools  Has occasional discomfort upper abdomen which seems to have improved  Denies any chest pain shortness of breath fever chills rash  ENT  problems, diet medications more than 10 pertinent systems on the prior records reviewed  REVIEW OF SYSTEMS IS OTHERWISE NEGATIVE        Historical Information   Past Medical History:   Diagnosis Date    Depression     Elevated liver enzymes     Fatty liver     Heartburn     Hypertension 4/2021    Psychiatric disorder depression    Renal disorder     stones     Past Surgical History:   Procedure Laterality Date    COLONOSCOPY      SD COLONOSCOPY FLX DX W/COLLJ SPEC WHEN PFRMD N/A 6/11/2018    Procedure: EGD AND COLONOSCOPY;  Surgeon: Lul Moise MD;  [facility]; Service: Gastroenterology    SD FRAGMENT KIDNEY STONE/ ESWL Left 11/4/2016    Procedure: Froilan Cheondoism SHOCKWAVE (ESWL); Surgeon: Johnny Christy MD;  [facility];  Service: Urology    TOOTH EXTRACTION      UPPER GASTROINTESTINAL ENDOSCOPY       Social History   Social History     Substance and Sexual Activity   Alcohol Use Yes    Alcohol/week: 0 0 standard drinks    Comment: socially     Social History     Substance and Sexual Activity   Drug Use No     Social History     Tobacco Use   Smoking Status Light Tobacco Smoker    Packs/day: 0 25    Years: 20 00    Pack years: 5 00    Types: Cigarettes    Start date: 2001   Smokeless Tobacco Never Used     Family History   Problem Relation Age of Onset   Wild [place]dy Migraines Mother     Depression Mother         Duane    Crohn's disease Father     Cancer Father     Depression Father     Liver disease Brother     Tourette syndrome Brother     Diabetes Paternal Grandmother        Meds/Allergies       Current Outpatient Medications:     dicyclomine (BENTYL) 20 mg tablet    omeprazole (PriLOSEC) 40 MG capsule    PARoxetine (PAXIL) 10 mg tablet    valsartan (DIOVAN) 160 mg tablet    No Known Allergies        Objective     Blood pressure 142/95, pulse 78, height 5' 8" (1 727 m), weight 91 9 kg (202 lb 9 6 oz)  Body mass index is 30 81 kg/m²  PHYSICAL EXAM:      General Appearance:   Alert, cooperative, no distress   HEENT:   Normocephalic, atraumatic, anicteric  Neck:  Supple, symmetrical, trachea midline   Lungs:   Clear to auscultation bilaterally; no rales, rhonchi or wheezing; respirations unlabored    Heart[de-identified]   Regular rate and rhythm; no murmur     Abdomen: Soft, non-tender, non-distended; normal bowel sounds; no masses, no organomegaly    Genitalia:   Deferred    Rectal:   Deferred    Extremities:  No cyanosis, clubbing or edema    Skin:  No jaundice, rashes, or lesions    Lymph nodes:  No palpable cervical lymphadenopathy        Lab Results:   No visits with results within 1 Day(s) from this visit     Latest known visit with results is:   Admission on 12/03/2021, Discharged on 12/03/2021   Component Date Value    WBC 12/03/2021 4 98     RBC 12/03/2021 5 48     Hemoglobin 12/03/2021 16 2     Hematocrit 12/03/2021 48 4     MCV 12/03/2021 88     MCH 12/03/2021 29 6     MCHC 12/03/2021 33 5     RDW 12/03/2021 12 6     MPV 12/03/2021 9 3     Platelets 42/69/9503 282     nRBC 12/03/2021 0     Neutrophils Relative 12/03/2021 51     Immat GRANS % 12/03/2021 0     Lymphocytes Relative 12/03/2021 38     Monocytes Relative 12/03/2021 9     Eosinophils Relative 12/03/2021 1     Basophils Relative 12/03/2021 1     Neutrophils Absolute 12/03/2021 2 52     Immature Grans Absolute 12/03/2021 0 01     Lymphocytes Absolute 12/03/2021 1 87     Monocytes Absolute 12/03/2021 0 47     Eosinophils Absolute 12/03/2021 0 07     Basophils Absolute 12/03/2021 0 04     Sodium 12/03/2021 135*    Potassium 12/03/2021 4 6     Chloride 12/03/2021 103     CO2 12/03/2021 23     ANION GAP 12/03/2021 9     BUN 12/03/2021 11     Creatinine 12/03/2021 0 96     Glucose 12/03/2021 101     Calcium 12/03/2021 9 3     AST 12/03/2021 123*    ALT 12/03/2021 230*    Alkaline Phosphatase 12/03/2021 65     Total Protein 12/03/2021 7 7     Albumin 12/03/2021 4 0     Total Bilirubin 12/03/2021 0 58     eGFR 12/03/2021 98     Lipase 12/03/2021 127     Color, UA 12/03/2021 Yellow     Clarity, UA 12/03/2021 Clear     pH, UA 12/03/2021 6 0     Leukocytes, UA 12/03/2021 Negative     Nitrite, UA 12/03/2021 Negative     Protein, UA 12/03/2021 Negative     Glucose, UA 12/03/2021 Negative     Ketones, UA 12/03/2021 Negative     Urobilinogen, UA 12/03/2021 0 2     Bilirubin, UA 12/03/2021 Negative     Blood, UA 12/03/2021 Negative     Specific Gravity, UA 12/03/2021 1 020          Radiology Results:   No results found

## 2022-04-28 ENCOUNTER — OFFICE VISIT (OUTPATIENT)
Dept: GASTROENTEROLOGY | Facility: CLINIC | Age: 42
End: 2022-04-28
Payer: COMMERCIAL

## 2022-04-28 VITALS
SYSTOLIC BLOOD PRESSURE: 155 MMHG | BODY MASS INDEX: 31.22 KG/M2 | WEIGHT: 206 LBS | DIASTOLIC BLOOD PRESSURE: 88 MMHG | HEIGHT: 68 IN | HEART RATE: 90 BPM

## 2022-04-28 DIAGNOSIS — K21.9 GASTROESOPHAGEAL REFLUX DISEASE WITHOUT ESOPHAGITIS: Primary | ICD-10-CM

## 2022-04-28 DIAGNOSIS — K58.0 IRRITABLE BOWEL SYNDROME WITH DIARRHEA: ICD-10-CM

## 2022-04-28 DIAGNOSIS — R10.10 PAIN LOCALIZED TO UPPER ABDOMEN: ICD-10-CM

## 2022-04-28 DIAGNOSIS — K76.9 LIVER LESION: ICD-10-CM

## 2022-04-28 DIAGNOSIS — K76.0 FATTY LIVER: ICD-10-CM

## 2022-04-28 PROCEDURE — 4004F PT TOBACCO SCREEN RCVD TLK: CPT | Performed by: NURSE PRACTITIONER

## 2022-04-28 PROCEDURE — 99214 OFFICE O/P EST MOD 30 MIN: CPT | Performed by: NURSE PRACTITIONER

## 2022-04-28 PROCEDURE — 3008F BODY MASS INDEX DOCD: CPT | Performed by: NURSE PRACTITIONER

## 2022-04-28 RX ORDER — DICYCLOMINE HCL 20 MG
20 TABLET ORAL 3 TIMES DAILY
Qty: 90 TABLET | Refills: 3 | Status: SHIPPED | OUTPATIENT
Start: 2022-04-28 | End: 2022-06-14

## 2022-04-28 RX ORDER — OMEPRAZOLE 40 MG/1
40 CAPSULE, DELAYED RELEASE ORAL
Qty: 30 CAPSULE | Refills: 5 | Status: SHIPPED | OUTPATIENT
Start: 2022-04-28 | End: 2022-07-23

## 2022-04-28 NOTE — PATIENT INSTRUCTIONS
Follow anti-reflux diet and measures also recommend high-fiber diet  Take 2 heaping tsp of Metamucil daily in 8 oz of water or juice  If you become constipated decrease how often you taking the dicyclomine

## 2022-04-28 NOTE — PROGRESS NOTES
[facility] - Outpatient Follow-up Note  Yanci Vegas 37 y o  male MRN: [id number]  Encounter: [id number]          ASSESSMENT AND PLAN:      1  Gastroesophageal reflux disease without esophagitis  2  Pain localized to upper abdomen  Patient reports that approximately 1-2 days ago he started experiencing pain in upper abdomen after eating fatty food  Patient does get intermittent episodes of acid reflux and heartburn  Patient has not been adhering to anti-reflux diet and measures as previously instructed  Patient does take omeprazole 40 mg daily with minimal relief  Differential diagnosis include GERD, gastritis, esophagitis, or underlying gallbladder etiology  - Increase omeprazole (PriLOSEC) 40 MG capsule; Take 1 capsule (40 mg total) by mouth 2 (two) times a day before meals  Dispense: 30 capsule; Refill: 5  -Patient instructed to follow anti-reflux diet and anti-reflux measures  - US abdomen limited for further evaluation of gallbladder    3  Irritable bowel syndrome with diarrhea   Patient also reports he has 3-4 bowel loose bowel movements daily  Patient does have a history of irritable bowel syndrome diarrhea  Patient denies blood in stool, blood from rectal area, or black tarry stool  Patient denies any history of constipation  Patient reported that he was not taking dicyclomine but  started taking medication over the last week 2 times daily   - Increase dicyclomine (BENTYL) 20 mg tablet; Take 1 tablet (20 mg total) by mouth 3 (three) times a day  Dispense: 90 tablet; Refill: 3  -Start Metamucil 2 heaping tsp daily in 8 oz of water or juice  -High-fiber diet    4  Fatty liver  Patient has history of fatty liver  Patient advised to lose weight with healthy eating and exercise  Patient's weight has been stable  5  Liver lesion  Patient has history of small liver lesion seen on MRI done 12/15/2021  Patient is scheduled for repeat MRI June 2022      Follow-up with   Araceli after repeat MRI     ______________________________________________________________________    SUBJECTIVE:  This is a 27-year-old male with a past medical history of hypertension, fatty liver, GERD, IBS diarrhea, depression, and liver lesion who presents to office for follow-up  Patient reports that approximately 1-2 days ago he started experiencing pain in upper abdomen after eating fatty food  Patient does get intermittent episodes of acid reflux and heartburn  Patient has not been adhering to anti-reflux diet and measures as previously instructed  Patient does take omeprazole 40 mg daily with minimal relief  Patient also reports he has 3-4 bowel loose bowel movements daily  Patient reported that he was not taking dicyclomine but  started taking medication over the last week 2 times daily     Patient does have a history of irritable bowel syndrome diarrhea  Patient denies blood in stool, blood from rectal area, or black tarry stool  Patient denies any history of constipation  Patient denies nausea, vomiting, or dysphagia  Patient had previous colonoscopy in 2018 by Dr Esperanza Haider secondary to diarrhea which was normal except for internal hemorrhoids  Patient also had a previous EGD 10/22/2021 which showed small sliding hiatal hernia type 1 and erythematous mucosa in antrum  Biopsy showed chronic inactive oxyntic and antral gastritis  Negative for H pylori, negative intestinal metaplasia, dysplasia, or carcinoma  Patient does smoke  Patient drinks alcohol socially  Positive family history colon cancer father and positive family history Crohn's disease father  REVIEW OF SYSTEMS IS OTHERWISE NEGATIVE        Historical Information   Past Medical History:   Diagnosis Date    Depression     Elevated liver enzymes     Fatty liver     Heartburn     Hypertension 4/2021    Psychiatric disorder     depression    Renal disorder     stones     Past Surgical History:   Procedure Laterality Date    COLONOSCOPY      [place] COLONOSCOPY FLX DX W/COLLJ SPEC WHEN PFRMD N/A 6/11/2018    Procedure: EGD AND COLONOSCOPY;  Surgeon: Eric Estrada MD;  [facility]; Service: Gastroenterology    [place] FRAGMENT KIDNEY STONE/ ESWL Left 11/4/2016    Procedure: Rosa Maria Cummings SHOCKWAVE (ESWL); Surgeon: Sadia Titus MD;  [facility];  Service: Urology    TOOTH EXTRACTION      UPPER GASTROINTESTINAL ENDOSCOPY       Social History   Social History     Substance and Sexual Activity   Alcohol Use Yes    Alcohol/week: 0 0 standard drinks    Comment: socially     Social History     Substance and Sexual Activity   Drug Use No     Social History     Tobacco Use   Smoking Status Light Tobacco Smoker    Packs/day: 0 25    Years: 20 00    Pack years: 5 00    Types: Cigarettes    Start date: 2001   Smokeless Tobacco Never Used     Family History   Problem Relation Age of Onset   Mad[place] Credit Migraines Mother     Depression Mother         Duane    Crohn's disease Father     Depression Father     Colon cancer Father     Liver disease Brother     Tourette syndrome Brother     Diabetes Paternal Grandmother        Meds/Allergies       Current Outpatient Medications:     PARoxetine (PAXIL) 10 mg tablet    valsartan (DIOVAN) 160 mg tablet    dicyclomine (BENTYL) 20 mg tablet    omeprazole (PriLOSEC) 40 MG capsule    No Known Allergies        Objective     Blood pressure 155/88, pulse 90, height 5' 8" (1 727 m), weight 93 4 kg (206 lb)  Body mass index is 31 32 kg/m²  PHYSICAL EXAM:      General Appearance:   Alert, cooperative, no distress   HEENT:   Normocephalic, atraumatic, anicteric      Neck:  Supple, symmetrical, trachea midline   Lungs:   Clear to auscultation bilaterally; no rales, rhonchi or wheezing; respirations unlabored    Heart[de-identified]   Regular rate and rhythm; no murmur, rub, or gallop     Abdomen:   Soft, mild tenderness in epigastric area with palpation, non-distended; normal bowel sounds; no masses, no organomegaly    Genitalia:   Deferred    Rectal:   Deferred    Extremities:  No cyanosis, clubbing or edema    Pulses:  2+ and symmetric    Skin:  No jaundice, rashes, or lesions    Lymph nodes:  No palpable cervical lymphadenopathy        Lab Results:   No visits with results within 1 Day(s) from this visit     Latest known visit with results is:   Admission on 12/03/2021, Discharged on 12/03/2021   Component Date Value    WBC 12/03/2021 4 98     RBC 12/03/2021 5 48     Hemoglobin 12/03/2021 16 2     Hematocrit 12/03/2021 48 4     MCV 12/03/2021 88     MCH 12/03/2021 29 6     MCHC 12/03/2021 33 5     RDW 12/03/2021 12 6     MPV 12/03/2021 9 3     Platelets 72/91/2397 282     nRBC 12/03/2021 0     Neutrophils Relative 12/03/2021 51     Immat GRANS % 12/03/2021 0     Lymphocytes Relative 12/03/2021 38     Monocytes Relative 12/03/2021 9     Eosinophils Relative 12/03/2021 1     Basophils Relative 12/03/2021 1     Neutrophils Absolute 12/03/2021 2 52     Immature Grans Absolute 12/03/2021 0 01     Lymphocytes Absolute 12/03/2021 1 87     Monocytes Absolute 12/03/2021 0 47     Eosinophils Absolute 12/03/2021 0 07     Basophils Absolute 12/03/2021 0 04     Sodium 12/03/2021 135*    Potassium 12/03/2021 4 6     Chloride 12/03/2021 103     CO2 12/03/2021 23     ANION GAP 12/03/2021 9     BUN 12/03/2021 11     Creatinine 12/03/2021 0 96     Glucose 12/03/2021 101     Calcium 12/03/2021 9 3     AST 12/03/2021 123*    ALT 12/03/2021 230*    Alkaline Phosphatase 12/03/2021 65     Total Protein 12/03/2021 7 7     Albumin 12/03/2021 4 0     Total Bilirubin 12/03/2021 0 58     eGFR 12/03/2021 98     Lipase 12/03/2021 127     Color, UA 12/03/2021 Yellow     Clarity, UA 12/03/2021 Clear     pH, UA 12/03/2021 6 0     Leukocytes, UA 12/03/2021 Negative     Nitrite, UA 12/03/2021 Negative     Protein, UA 12/03/2021 Negative     Glucose, UA 12/03/2021 Negative     Ketones, UA 12/03/2021 Negative     Urobilinogen, UA 12/03/2021 0 2     Bilirubin, UA 12/03/2021 Negative     Blood, UA 12/03/2021 Negative     Specific Gravity, UA 12/03/2021 1 020          Radiology Results:   No results found

## 2022-05-09 ENCOUNTER — HOSPITAL ENCOUNTER (OUTPATIENT)
Dept: ULTRASOUND IMAGING | Facility: HOSPITAL | Age: 42
Discharge: HOME | End: 2022-05-09
Payer: COMMERCIAL

## 2022-05-09 DIAGNOSIS — R10.10 PAIN LOCALIZED TO UPPER ABDOMEN: ICD-10-CM

## 2022-05-09 PROCEDURE — 76705 ECHO EXAM OF ABDOMEN: CPT

## 2022-05-20 ENCOUNTER — APPOINTMENT (OUTPATIENT)
Dept: LAB | Facility: CLINIC | Age: 42
End: 2022-05-20
Payer: COMMERCIAL

## 2022-05-20 DIAGNOSIS — R79.89 ABNORMAL LFTS: ICD-10-CM

## 2022-05-20 DIAGNOSIS — E78.00 HYPERCHOLESTEROLEMIA: ICD-10-CM

## 2022-05-20 DIAGNOSIS — K76.0 FATTY LIVER: ICD-10-CM

## 2022-05-20 DIAGNOSIS — I10 ESSENTIAL HYPERTENSION: ICD-10-CM

## 2022-05-20 LAB
ALBUMIN SERPL BCP-MCNC: 4.3 G/DL (ref 3.5–5)
ALP SERPL-CCNC: 50 U/L (ref 34–104)
ALT SERPL W P-5'-P-CCNC: 130 U/L (ref 7–52)
ANION GAP SERPL CALCULATED.3IONS-SCNC: 8 MMOL/L (ref 4–13)
AST SERPL W P-5'-P-CCNC: 63 U/L (ref 13–39)
BILIRUB SERPL-MCNC: 0.79 MG/DL (ref 0.2–1)
BUN SERPL-MCNC: 20 MG/DL (ref 5–25)
CALCIUM SERPL-MCNC: 9.3 MG/DL (ref 8.4–10.2)
CHLORIDE SERPL-SCNC: 106 MMOL/L (ref 96–108)
CHOLEST SERPL-MCNC: 230 MG/DL
CO2 SERPL-SCNC: 26 MMOL/L (ref 21–32)
CREAT SERPL-MCNC: 1.02 MG/DL (ref 0.6–1.3)
GFR SERPL CREATININE-BSD FRML MDRD: 91 ML/MIN/1.73SQ M
GLUCOSE P FAST SERPL-MCNC: 87 MG/DL (ref 65–99)
HDLC SERPL-MCNC: 45 MG/DL
LDLC SERPL CALC-MCNC: 152 MG/DL (ref 0–100)
NONHDLC SERPL-MCNC: 185 MG/DL
POTASSIUM SERPL-SCNC: 3.9 MMOL/L (ref 3.5–5.3)
PROT SERPL-MCNC: 6.8 G/DL (ref 6.4–8.4)
SODIUM SERPL-SCNC: 140 MMOL/L (ref 135–147)
TRIGL SERPL-MCNC: 163 MG/DL

## 2022-05-20 PROCEDURE — 36415 COLL VENOUS BLD VENIPUNCTURE: CPT

## 2022-05-20 PROCEDURE — 80061 LIPID PANEL: CPT

## 2022-05-20 PROCEDURE — 80053 COMPREHEN METABOLIC PANEL: CPT

## 2022-06-05 ENCOUNTER — HOSPITAL ENCOUNTER (OUTPATIENT)
Dept: MRI IMAGING | Facility: HOSPITAL | Age: 42
Discharge: HOME | End: 2022-06-05
Attending: INTERNAL MEDICINE
Payer: COMMERCIAL

## 2022-06-05 DIAGNOSIS — R16.0 HEPATOMEGALY: ICD-10-CM

## 2022-06-05 DIAGNOSIS — K76.9 LIVER LESION: ICD-10-CM

## 2022-06-05 DIAGNOSIS — K76.0 FATTY LIVER: ICD-10-CM

## 2022-06-05 DIAGNOSIS — R10.10 UPPER ABDOMINAL PAIN: ICD-10-CM

## 2022-06-05 PROCEDURE — 74183 MRI ABD W/O CNTR FLWD CNTR: CPT

## 2022-06-05 PROCEDURE — A9585 GADOBUTROL INJECTION: HCPCS | Performed by: INTERNAL MEDICINE

## 2022-06-05 PROCEDURE — G1004 CDSM NDSC: HCPCS

## 2022-06-05 RX ADMIN — GADOBUTROL 9 ML: 604.72 INJECTION INTRAVENOUS at 10:36

## 2022-06-09 NOTE — RESULT ENCOUNTER NOTE
Please call the patient regarding his abnormal result  MRI does not show any mass tumor in the liver, small hemangioma/cluster of blood vessel seen, nothing to worry about  Somewhat enlarged liver may be from fatty liver, associated liver disease    Follow up in my office as needed

## 2022-06-14 ENCOUNTER — TELEMEDICINE (OUTPATIENT)
Dept: FAMILY MEDICINE CLINIC | Facility: CLINIC | Age: 42
End: 2022-06-14
Payer: COMMERCIAL

## 2022-06-14 VITALS — WEIGHT: 206 LBS | BODY MASS INDEX: 31.22 KG/M2 | HEIGHT: 68 IN

## 2022-06-14 DIAGNOSIS — U07.1 COVID-19: Primary | ICD-10-CM

## 2022-06-14 LAB
SARS-COV-2 AG UPPER RESP QL IA: POSITIVE
VALID CONTROL: ABNORMAL

## 2022-06-14 PROCEDURE — 4004F PT TOBACCO SCREEN RCVD TLK: CPT

## 2022-06-14 PROCEDURE — 3008F BODY MASS INDEX DOCD: CPT

## 2022-06-14 PROCEDURE — 87811 SARS-COV-2 COVID19 W/OPTIC: CPT

## 2022-06-14 PROCEDURE — 99213 OFFICE O/P EST LOW 20 MIN: CPT

## 2022-06-14 NOTE — PROGRESS NOTES
COVID-19 Outpatient Progress Note    Assessment/Plan:    Problem List Items Addressed This Visit        Other    COVID-19 - Primary     Pt did at home test yesterday that was positive  Meets criteria for paxlovid, ordered and advised to quarantine for 5 days  Symptom management for cough using robitussin  F/u in ER for difficulty breathing, fever > 101 or worsening symptoms  Call office if no improvement in symptoms after 5 days  Relevant Medications    molnupiravir 200 mg capsule    Other Relevant Orders    COVID Only - Office Collect         Disposition:     I recommended the patient to come to our office to perform rapid antigen testing for COVID-19  Recommended patient to come to the office to test for COVID-19  Patient is fully vaccinated and I recommended self quarantine for 5 days followed by strict mask use for an additional 5 days  If patient were to develop symptoms, they should immediately self isolate and call our office for further guidance  Discussed symptom directed medication options with patient  Pt states that his job is requiring antigen test  Will come to office for testing     Patient meets criteria for PAXLOVID and they have been counseled appropriately according to EUA documentation released by the FDA  After discussion, patient agrees to treatment  Vinod Bye is an investigational medicine used to treat mild-to-moderate COVID-19 in adults and children (15years of age and older weighing at least 80 pounds (40 kg)) with positive results of direct SARS-CoV-2 viral testing, and who are at high risk for progression to severe COVID-19, including hospitalization or death  PAXLOVID is investigational because it is still being studied  There is limited information about the safety and effectiveness of using PAXLOVID to treat people with mild-to-moderate COVID-19      The FDA has authorized the emergency use of PAXLOVID for the treatment of mild-tomoderate COVID-19 in adults and children (15years of age and older weighing at least 80 pounds (40 kg)) with a positive test for the virus that causes COVID-19, and who are at high risk for progression to severe COVID-19, including hospitalization or death, under an EUA  What should I tell my healthcare provider before I take PAXLOVID? Tell your healthcare provider if you:  - Have any allergies  - Have liver or kidney disease  - Are pregnant or plan to become pregnant  - Are breastfeeding a child  - Have any serious illnesses    Tell your healthcare provider about all the medicines you take, including prescription and over-the-counter medicines, vitamins, and herbal supplements  Some medicines may interact with PAXLOVID and may cause serious side effects  Keep a list of your medicines to show your healthcare provider and pharmacist when you get a new medicine  You can ask your healthcare provider or pharmacist for a list of medicines that interact with PAXLOVID  Do not start taking a new medicine without telling your healthcare provider  Your healthcare provider can tell you if it is safe to take PAXLOVID with other medicines  Tell your healthcare provider if you are taking combined hormonal contraceptive  PAXLOVID may affect how your birth control pills work  Females who are able to become pregnant should use another effective alternative form of contraception or an additional barrier method of contraception  Talk to your healthcare provider if you have any questions about contraceptive methods that might be right for you  How do I take PAXLOVID? PAXLOVID consists of 2 medicines: nirmatrelvir and ritonavir  - Take 2 pink tablets of nirmatrelvir with 1 white tablet of ritonavir by mouth 2 times each day (in the morning and in the evening) for 5 days  For each dose, take all 3 tablets at the same time  - If you have kidney disease, talk to your healthcare provider  You may need a different dose  - Swallow the tablets whole  Do not chew, break, or crush the tablets  - Take PAXLOVID with or without food  - Do not stop taking PAXLOVID without talking to your healthcare provider, even if you feel better  - If you miss a dose of PAXLOVID within 8 hours of the time it is usually taken, take it as soon as you remember  If you miss a dose by more than 8 hours, skip the missed dose and take the next dose at your regular time  Do not take 2 doses of PAXLOVID at the same time  - If you take too much PAXLOVID, call your healthcare provider or go to the nearest hospital emergency room right away  - If you are taking a ritonavir- or cobicistat-containing medicine to treat hepatitis C or Human Immunodeficiency Virus (HIV), you should continue to take your medicine as prescribed by your healthcare provider   - Talk to your healthcare provider if you do not feel better or if you feel worse after 5 days  Who should generally not take PAXLOVID? Do not take PAXLOVID if:  You are allergic to nirmatrelvir, ritonavir, or any of the ingredients in PAXLOVID  You are taking any of the following medicines:  - Alfuzosin  - Pethidine, piroxicam, propoxyphene  - Ranolazine  - Amiodarone, dronedarone, flecainide, propafenone, quinidine  - Colchicine  - Lurasidone, pimozide, clozapine  - Dihydroergotamine, ergotamine, methylergonovine  - Lovastatin, simvastatin  - Sildenafil (Revatio®) for pulmonary arterial hypertension (PAH)  - Triazolam, oral midazolam  - Apalutamide  - Carbamazepine, phenobarbital, phenytoin  - Rifampin  - St  Germains Wort (hypericum perforatum)    What are the important possible side effects of PAXLOVID? Possible side effects of PAXLOVID are:  - Liver Problems  Tell your healthcare provider right away if you have any of these signs and symptoms of liver problems: loss of appetite, yellowing of your skin and the whites of eyes (jaundice), dark-colored urine, pale colored stools and itchy skin, stomach area (abdominal) pain    - Resistance to HIV Medicines  If you have untreated HIV infection, PAXLOVID may lead to some HIV medicines not working as well in the future  - Other possible side effects include: altered sense of taste, diarrhea, high blood pressure, or muscle aches    These are not all the possible side effects of PAXLOVID  Not many people have taken PAXLOVID  Serious and unexpected side effects may happen  Jerica Bras is still being studied, so it is possible that all of the risks are not known at this time  What other treatment choices are there? Like Saul Carrasquillo may allow for the emergency use of other medicines to treat people with COVID-19  Go to https://StyleSeat/ for information on the emergency use of other medicines that are authorized by FDA to treat people with COVID-19  Your healthcare provider may talk with you about clinical trials for which you may be eligible  It is your choice to be treated or not to be treated with PAXLOVID  Should you decide not to receive it or for your child not to receive it, it will not change your standard medical care  What if I am pregnant or breastfeeding? There is no experience treating pregnant women or breastfeeding mothers with PAXLOVID  For a mother and unborn baby, the benefit of taking PAXLOVID may be greater than the risk from the treatment  If you are pregnant, discuss your options and specific situation with your healthcare provider  It is recommended that you use effective barrier contraception or do not have sexual activity while taking PAXLOVID  If you are breastfeeding, discuss your options and specific situation with your healthcare provider  How do I report side effects with PAXLOVID? Contact your healthcare provider if you have any side effects that bother you or do not go away      Report side effects to FDA MedWatch at www fda gov/medwatch or call 9-252-JXH9152 or you can report side effects to [facility]  at the contact information provided below  Website Fax number Telephone number   [employer] [phone]     How should I store Manuelito Bravo? Store PAXLOVID tablets at room temperature between 68°F to 77°F (20°C to 25°C)  Full fact sheet for patients, parents, and caregivers can be found at: Karen hammer    I have spent 15 minutes directly with the patient  Encounter provider Brando Carbajal, 10 [facility]    Provider located at [address] [place] [id number] [phone]    Recent Visits  No visits were found meeting these conditions  Showing recent visits within past 7 days and meeting all other requirements  Today's Visits  Date Type Provider Dept   06/14/22 Telemedicine Anibal Leo, 612 [facility] today's visits and meeting all other requirements  Future Appointments  No visits were found meeting these conditions  Showing future appointments within next 150 days and meeting all other requirements     This virtual check-in was done via [facility] and patient was informed that this is a secure, HIPAA-compliant platform  He agrees to proceed  Patient agrees to participate in a virtual check in via telephone or video visit instead of presenting to the office to address urgent/immediate medical needs  Patient is aware this is a billable service  After connecting through [facility], the patient was identified by name and date of birth  Yanci Vegas was informed that this was a telemedicine visit and that the exam was being conducted confidentially over secure lines  My office door was closed  No one else was in the room  Yanci Vegas acknowledged consent and understanding of privacy and security of the telemedicine visit   I informed the patient that I have reviewed his record in [employer] and presented the opportunity for him to ask any questions regarding the visit today  The patient agreed to participate  Verification of patient location:  Patient is located in the following state in which I hold an active license: [place]    Subjective:   Katie Fernando is a 37 y o  male who is concerned about COVID-19  Patient's symptoms include fatigue, malaise, nasal congestion, rhinorrhea, sore throat, cough, myalgias and headache  Patient denies fever, chills, anosmia, loss of taste, shortness of breath, chest tightness, abdominal pain, nausea, vomiting and diarrhea       - Date of symptom onset: 6/12/2022      COVID-19 vaccination status: Fully vaccinated (primary series)    Exposure:   Contact with a person who is under investigation (PUI) for or who is positive for COVID-19 within the last 14 days?: Yes    Hospitalized recently for fever and/or lower respiratory symptoms?: No      Currently a healthcare worker that is involved in direct patient care?: No      Works in a special setting where the risk of COVID-19 transmission may be high? (this may include long-term care, correctional and nursing home facilities; homeless shelters; assisted-living facilities and group homes ): No      Resident in a special setting where the risk of COVID-19 transmission may be high? (this may include long-term care, correctional and nursing home facilities; homeless shelters; assisted-living facilities and group homes ): No      Pt unsure of date of contact    Lab Results   Component Value Date    SARSCOV2 Negative 03/01/2021    Karen Dion Not Detected 12/30/2020     Past Medical History:   Diagnosis Date    Depression     Elevated liver enzymes     Fatty liver     Heartburn     Hypertension 4/2021    Psychiatric disorder     depression    Renal disorder     stones     Past Surgical History:   Procedure Laterality Date    COLONOSCOPY      [place] COLONOSCOPY FLX DX W/COLLJ SPEC WHEN PFRMD N/A 6/11/2018 Procedure: EGD AND COLONOSCOPY;  Surgeon: Earl Betancur MD;  [facility]; Service: Gastroenterology    [place] FRAGMENT KIDNEY STONE/ ESWL Left 11/4/2016    Procedure: Ashley Luciano SHOCKWAVE (ESWL); Surgeon: Marco Sousa MD;  Location:  MAIN OR;  Service: Urology    TOOTH EXTRACTION      UPPER GASTROINTESTINAL ENDOSCOPY       Current Outpatient Medications   Medication Sig Dispense Refill    dicyclomine (BENTYL) 20 mg tablet Take 1 tablet (20 mg total) by mouth 3 (three) times a day 90 tablet 3    molnupiravir 200 mg capsule Take 4 capsules (800 mg total) by mouth every 12 (twelve) hours for 5 days 40 capsule 0    omeprazole (PriLOSEC) 40 MG capsule Take 1 capsule (40 mg total) by mouth 2 (two) times a day before meals 30 capsule 5    PARoxetine (PAXIL) 10 mg tablet TAKE 1 TABLET BY MOUTH EVERY DAY 90 tablet 2    valsartan (DIOVAN) 160 mg tablet Take 1 tablet (160 mg total) by mouth daily 90 tablet 3     No current facility-administered medications for this visit  No Known Allergies    Review of Systems   Constitutional: Positive for fatigue  Negative for activity change, chills and fever  HENT: Positive for congestion, rhinorrhea, sneezing and sore throat  Negative for facial swelling, postnasal drip and trouble swallowing  Respiratory: Positive for cough  Negative for chest tightness and shortness of breath  Gastrointestinal: Negative for abdominal pain, diarrhea, nausea and vomiting  Musculoskeletal: Positive for myalgias  Neurological: Positive for headaches  Negative for dizziness  Objective:    Vitals:    06/14/22 1059   Weight: 93 4 kg (206 lb)   Height: 5' 8" (1 727 m)       Physical Exam  Constitutional:       General: He is not in acute distress  Appearance: He is ill-appearing  HENT:      Nose: Congestion and rhinorrhea present  Mouth/Throat: Tonsils: No tonsillar exudate     Eyes:      Conjunctiva/sclera: Conjunctivae normal  Pulmonary:      Effort: Pulmonary effort is normal  No respiratory distress  Neurological:      Mental Status: He is alert  VIRTUAL VISIT DISCLAIMER    Marlena Woodward verbally agrees to participate in [facility]  Pt is aware that [facility] could be limited without vital signs or the ability to perform a full hands-on physical Marden Senate understands he or the provider may request at any time to terminate the video visit and request the patient to seek care or treatment in person

## 2022-06-14 NOTE — ASSESSMENT & PLAN NOTE
Pt did at home test yesterday that was positive  Will have rapid test done in clinic  Meets criteria for paxlovid, ordered and advised to quarantine for 5 days  Symptom management for cough using robitussin  F/u in ER for difficulty breathing, fever > 101 or worsening symptoms  Call office if no improvement in symptoms after 5 days

## 2022-06-14 NOTE — LETTER
June 14, 2022    Patient: Mariia Means  YOB: 1980  Date of Last Encounter: 2/17/2022      To whom it may concern:     Mariia Means has tested positive for COVID-19 (Coronavirus)  He may return to work on 6/17/2022, which is 5 days from illness onset (provided symptoms are improving) and 24 hours without fever      Sincerely,         RICHIE Zurita

## 2022-06-14 NOTE — PROGRESS NOTES
COVID-19 Outpatient Progress Note    Assessment/Plan:    Problem List Items Addressed This Visit    None     Visit Diagnoses     COVID-19    -  Primary    Relevant Medications    molnupiravir 200 mg capsule         COVID-19 Plan   Encounter provider RICHIE Koch    Provider located at [address] [place] [id number] [phone]    Recent Visits  No visits were found meeting these conditions  Showing recent visits within past 7 days and meeting all other requirements  Today's Visits  Date Type Provider Dept   06/14/22 [facility]medicine [facility] today's visits and meeting all other requirements  Future Appointments  No visits were found meeting these conditions  Showing future appointments within next 150 days and meeting all other requirements     COVID-19 HPI  Lab Results   Component Value Date    SARSCOV2 Negative 03/01/2021    SARSCOV2 Not Detected 12/30/2020     Past Medical History:   Diagnosis Date    Depression     Elevated liver enzymes     Fatty liver     Heartburn     Hypertension 4/2021    Psychiatric disorder     depression    Renal disorder     stones     Past Surgical History:   Procedure Laterality Date    COLONOSCOPY      [place] COLONOSCOPY FLX DX W/COLLJ SPEC WHEN PFRMD N/A 6/11/2018    Procedure: EGD AND COLONOSCOPY;  Surgeon: Rabia Card MD;  Location: [facility]; Service: Gastroenterology    [place] FRAGMENT KIDNEY STONE/ ESWL Left 11/4/2016    Procedure: Nena Parker SHOCKWAVE (ESWL);   Surgeon: Reginald Sanchez MD;  Location: [facility] MAIN OR;  Service: Urology    TOOTH EXTRACTION      UPPER GASTROINTESTINAL ENDOSCOPY       Current Outpatient Medications   Medication Sig Dispense Refill    dicyclomine (BENTYL) 20 mg tablet Take 1 tablet (20 mg total) by mouth 3 (three) times a day 90 tablet 3    omeprazole (PriLOSEC) 40 MG capsule Take 1 capsule (40 mg total) by mouth 2 (two) times a day before meals 30 capsule 5    PARoxetine (PAXIL) 10 mg tablet TAKE 1 TABLET BY MOUTH EVERY DAY 90 tablet 2    valsartan (DIOVAN) 160 mg tablet Take 1 tablet (160 mg total) by mouth daily 90 tablet 3    molnupiravir 200 mg capsule Take 4 capsules (800 mg total) by mouth every 12 (twelve) hours for 5 days 40 capsule 0     No current facility-administered medications for this visit  No Known Allergies    Review of Systems  Objective:    Vitals:    06/14/22 1059   Weight: 93 4 kg (206 lb)   Height: 5' 8" (1 727 m)       Physical Exam    VIRTUAL VISIT Kassi Rueda verbally agrees to participate in [employer]  Pt is aware that [employer] could be limited without vital signs or the ability to perform a full hands-on physical Leidymahesh Ham understands he or the provider may request at any time to terminate the video visit and request the patient to seek care or treatment in person  Virtual Regular Visit    Verification of patient location:    Patient is located in the following state in which I hold an active license {[facility] virtual patient location:[address]}      Assessment/Plan:    Problem List Items Addressed This Visit    None     Visit Diagnoses     COVID-19    -  Primary    Relevant Medications    molnupiravir 200 mg capsule               Reason for visit is   Chief Complaint   Patient presents with    Sore Throat    Cough    sneezing    COVID-19     Tested positive last night    COVID-19    Virtual Regular Visit        Encounter provider Farhana Glass De Aristeo, 10 [facility]    Provider located at [address] [place] [id number] [phone]      Recent Visits  No visits were found meeting these conditions    Showing recent visits within past 7 days and meeting all other requirements  Today's Visits  Date Type Provider Dept   06/14/22 Telemedicine RICHIE Morales [facility] [place] [facility]    Showing today's visits and meeting all other requirements  Future Appointments  No visits were found meeting these conditions  Showing future appointments within next 150 days and meeting all other requirements       The patient was identified by name and date of birth  Shubham Cox was informed that this is a telemedicine visit and that the visit is being conducted through {AMB VIRTUAL VISIT NXKFVB:66278}  {Telemedicine confidentiality :74527} {Telemedicine participants:76644}  He acknowledged consent and understanding of privacy and security of the video platform  The patient has agreed to participate and understands they can discontinue the visit at any time  Patient is aware this is a billable service  Subjective  Shubham Cox is a 37 y o  male ***   HPI     Past Medical History:   Diagnosis Date    Depression     Elevated liver enzymes     Fatty liver     Heartburn     Hypertension 4/2021    Psychiatric disorder     depression    Renal disorder     stones       Past Surgical History:   Procedure Laterality Date    COLONOSCOPY      [place] COLONOSCOPY FLX DX W/COLLJ SPEC WHEN PFRMD N/A 6/11/2018    Procedure: EGD AND COLONOSCOPY;  Surgeon: Reji Gallego MD;  [facility]: [facility]; Service: Gastroenterology    [place] FRAGMENT KIDNEY STONE/ ESWL Left 11/4/2016    Procedure: Mart Bess SHOCKWAVE (ESWL);   Surgeon: Dinora Rendon MD;  [facility];  Service: Urology    TOOTH EXTRACTION      UPPER GASTROINTESTINAL ENDOSCOPY         Current Outpatient Medications   Medication Sig Dispense Refill    dicyclomine (BENTYL) 20 mg tablet Take 1 tablet (20 mg total) by mouth 3 (three) times a day 90 tablet 3    omeprazole (PriLOSEC) 40 MG capsule Take 1 capsule (40 mg total) by mouth 2 (two) times a day before meals 30 capsule 5    PARoxetine (PAXIL) 10 mg tablet TAKE 1 TABLET BY MOUTH EVERY DAY 90 tablet 2  valsartan (DIOVAN) 160 mg tablet Take 1 tablet (160 mg total) by mouth daily 90 tablet 3    molnupiravir 200 mg capsule Take 4 capsules (800 mg total) by mouth every 12 (twelve) hours for 5 days 40 capsule 0     No current facility-administered medications for this visit  No Known Allergies        Video Exam    Vitals:    06/14/22 1059   Weight: 93 4 kg (206 lb)   Height: 5' 8" (1 727 m)       Physical Exam     {Time Spent:80555}    VIRTUAL VISIT DISCLAIMER      Vitaliy Bob verbally agrees to participate in [employer]  Pt is aware that [employer] could be limited without vital signs or the ability to perform a full hands-on physical Yves Cole understands he or the provider may request at any time to terminate the video visit and request the patient to seek care or treatment in person

## 2022-06-17 ENCOUNTER — TELEPHONE (OUTPATIENT)
Dept: GASTROENTEROLOGY | Facility: CLINIC | Age: 42
End: 2022-06-17

## 2022-06-17 NOTE — TELEPHONE ENCOUNTER
Called and lmom asking pt to call back to reschedule appt  Dr Sally Cabello is not in the office this day  Pt needed to be cancelled out  I will try calling him again in 1 week if we don't hear back from him before then  If he calls back, please get him rescheduled

## 2022-06-23 NOTE — TELEPHONE ENCOUNTER
Called and lmom for pt to call back  We had to cancel his appt that was scheduled for 6/21  I was calling to see if he wants to get rescheduled  At this point we will wait to hear back from him  If he calls back, please get him rescheduled  Thank you

## 2022-07-23 DIAGNOSIS — K21.9 GASTROESOPHAGEAL REFLUX DISEASE WITHOUT ESOPHAGITIS: ICD-10-CM

## 2022-07-23 RX ORDER — OMEPRAZOLE 40 MG/1
CAPSULE, DELAYED RELEASE ORAL
Qty: 180 CAPSULE | Refills: 0 | Status: SHIPPED | OUTPATIENT
Start: 2022-07-23

## 2022-09-02 DIAGNOSIS — F32.A CHRONIC DEPRESSION: ICD-10-CM

## 2022-09-02 RX ORDER — PAROXETINE 10 MG/1
TABLET, FILM COATED ORAL
Qty: 90 TABLET | Refills: 2 | Status: SHIPPED | OUTPATIENT
Start: 2022-09-02 | End: 2022-10-20 | Stop reason: SDUPTHER

## 2022-09-22 DIAGNOSIS — K58.0 IRRITABLE BOWEL SYNDROME WITH DIARRHEA: ICD-10-CM

## 2022-09-22 RX ORDER — DICYCLOMINE HCL 20 MG
TABLET ORAL
Qty: 270 TABLET | Refills: 1 | Status: SHIPPED | OUTPATIENT
Start: 2022-09-22

## 2022-10-10 DIAGNOSIS — K21.9 GASTROESOPHAGEAL REFLUX DISEASE WITHOUT ESOPHAGITIS: ICD-10-CM

## 2022-10-10 RX ORDER — OMEPRAZOLE 40 MG/1
40 CAPSULE, DELAYED RELEASE ORAL DAILY
Qty: 90 CAPSULE | Refills: 3 | Status: SHIPPED | OUTPATIENT
Start: 2022-10-10 | End: 2023-02-02

## 2022-10-20 DIAGNOSIS — F32.A CHRONIC DEPRESSION: ICD-10-CM

## 2022-10-20 RX ORDER — PAROXETINE 10 MG/1
10 TABLET, FILM COATED ORAL DAILY
Qty: 90 TABLET | Refills: 0 | Status: SHIPPED | OUTPATIENT
Start: 2022-10-20

## 2022-11-17 NOTE — ASSESSMENT & PLAN NOTE
No recent symptoms  Continue omeprazole 40 mg qd and GERD diet  Current Diet: Diet, DASH/TLC:   Sodium & Cholesterol Restricted  Supplement Feeding Modality:  Oral  Ensure Max Cans or Servings Per Day:  1       Frequency:  Three Times a day  Special Instructions for Nursing:  Sodium & Cholesterol Restricted (11-11-22 @ 21:12)    PO intake:  < 50% [ x ]   50-75%  [ ]   %  [ ]  other :    Source for PO intake [ x ] Patient [ x ] family [ ] chart [ ] staff [ ] other    Weight History:  (11/17) 88.1lbs  (11/16) 86.2lbs  (11/15) 85.3lbs  (11/14) 111.5lbs  (11/13) 84.2lbs  (11/11) 83.9lbs  (11/10) 83.7lbs  (11/9) 111.9lbs    % Weight Change: family reports ~100lb loss x ~x 1 year; unable to determine UBW    Pertinent Medications: MEDICATIONS  (STANDING):  albuterol    90 MICROgram(s) HFA Inhaler 2 Puff(s) Inhalation four times a day  apixaban 10 milliGRAM(s) Oral every 12 hours  ascorbic acid 500 milliGRAM(s) Oral daily  atorvastatin 80 milliGRAM(s) Oral at bedtime  budesonide 160 MICROgram(s)/formoterol 4.5 MICROgram(s) Inhaler 2 Puff(s) Inhalation two times a day  chlorhexidine 2% Cloths 1 Application(s) Topical daily  guaiFENesin  milliGRAM(s) Oral every 12 hours  influenza  Vaccine (HIGH DOSE) 0.7 milliLiter(s) IntraMuscular once  lidocaine   4% Patch 2 Patch Transdermal daily  multivitamin/minerals 1 Tablet(s) Oral daily  tiotropium 18 MICROgram(s) Capsule 1 Capsule(s) Inhalation daily    MEDICATIONS  (PRN):  acetaminophen     Tablet .. 650 milliGRAM(s) Oral every 8 hours PRN Mild Pain (1 - 3), Moderate Pain (4 - 6)  albuterol/ipratropium for Nebulization 3 milliLiter(s) Nebulizer every 6 hours PRN Shortness of Breath and/or Wheezing  melatonin 5 milliGRAM(s) Oral at bedtime PRN Insomnia  oxycodone    5 mG/acetaminophen 325 mG 1 Tablet(s) Oral every 6 hours PRN Moderate Pain (4 - 6)    Pertinent Labs: CBC Full  -  ( 16 Nov 2022 04:40 )  WBC Count : 12.01 K/uL  RBC Count : 3.83 M/uL  Hemoglobin : 11.2 g/dL  Hematocrit : 33.0 %  Platelet Count - Automated : 284 K/uL  Mean Cell Volume : 86.2 fl  Mean Cell Hemoglobin : 29.2 pg  Mean Cell Hemoglobin Concentration : 33.9 gm/dL      Skin: Stage 2 Coccyx  Edema: 1+ L and R wrist    Nutrition focused physical exam previously conducted - found signs of malnutrition [ ]absent [ x]present    Subcutaneous fat loss: [ x] Orbital fat pads region, [x ]Buccal fat region, [ ]Triceps region,  [ x]Ribs region    Muscle wasting: [x ]Temples region, [x ]Clavicle region, [ x]Shoulder region, [ ]Scapula region, [ ]Interosseous region,  [ ]thigh region, [ ]Calf region      CURRENT NUTRITION DIAGNOSIS: Pt remains at nutrition risk secondary to Severe Protein Calorie Malnutrition (chronic) related to inability to meet sufficient protein-energy needs with poor appetite in setting of large pericardial effusion, left lung mass (suspicious for malignancy) and skin breakdown as evidenced by pt with severe muscle wasting/fat loss and meeting <75% estimated nutrient needs >1 month.  Patient and patient son present for interview. Reports poor PO intake since admission and PTA. Son states that patient PO intake and status has decreased since he moved out of state. Patient lives alone. Patient reports liking Ensure drinks with ~100% consumption. Encouraged patient to order meals/snacks and provided kitchen contact information. Clarified that patient is with increased nutrition needs 2/2 current clinical status and malignancy. Patient and patient son in good understanding. RD to remain available.     Recommendations:   1) Continue Ensure TID (1050kcal,60gpro)  2) Continue supplementation MVI, Vit C daily  3) Provide encouragement with meals/snacks  4) Monitor nutrition related labs and hydration status; Monitor wts    Monitoring and Evaluation:   [x ] PO intake [x ] Tolerance to diet prescription [X] Weights  [X] Follow up per protocol [X] Labs: Current Diet: Diet, DASH/TLC:   Sodium & Cholesterol Restricted  Supplement Feeding Modality:  Oral  Ensure Max Cans or Servings Per Day:  1       Frequency:  Three Times a day  Special Instructions for Nursing:  Sodium & Cholesterol Restricted (11-11-22 @ 21:12)    PO intake:  < 50% [ x ]   50-75%  [ ]   %  [ ]  other :    Source for PO intake [ x ] Patient [ x ] family [ ] chart [ ] staff [ ] other    Weight History:  (11/17) 88.1lbs  (11/16) 86.2lbs  (11/15) 85.3lbs  (11/14) 111.5lbs  (11/13) 84.2lbs  (11/11) 83.9lbs  (11/10) 83.7lbs  (11/9) 111.9lbs    % Weight Change: family reports ~100lb loss x ~x 1 year; unable to determine UBW    Pertinent Medications: MEDICATIONS  (STANDING):  albuterol    90 MICROgram(s) HFA Inhaler 2 Puff(s) Inhalation four times a day  apixaban 10 milliGRAM(s) Oral every 12 hours  ascorbic acid 500 milliGRAM(s) Oral daily  atorvastatin 80 milliGRAM(s) Oral at bedtime  budesonide 160 MICROgram(s)/formoterol 4.5 MICROgram(s) Inhaler 2 Puff(s) Inhalation two times a day  chlorhexidine 2% Cloths 1 Application(s) Topical daily  guaiFENesin  milliGRAM(s) Oral every 12 hours  influenza  Vaccine (HIGH DOSE) 0.7 milliLiter(s) IntraMuscular once  lidocaine   4% Patch 2 Patch Transdermal daily  multivitamin/minerals 1 Tablet(s) Oral daily  tiotropium 18 MICROgram(s) Capsule 1 Capsule(s) Inhalation daily    MEDICATIONS  (PRN):  acetaminophen     Tablet .. 650 milliGRAM(s) Oral every 8 hours PRN Mild Pain (1 - 3), Moderate Pain (4 - 6)  albuterol/ipratropium for Nebulization 3 milliLiter(s) Nebulizer every 6 hours PRN Shortness of Breath and/or Wheezing  melatonin 5 milliGRAM(s) Oral at bedtime PRN Insomnia  oxycodone    5 mG/acetaminophen 325 mG 1 Tablet(s) Oral every 6 hours PRN Moderate Pain (4 - 6)    Pertinent Labs: CBC Full  -  ( 16 Nov 2022 04:40 )  WBC Count : 12.01 K/uL  RBC Count : 3.83 M/uL  Hemoglobin : 11.2 g/dL  Hematocrit : 33.0 %  Platelet Count - Automated : 284 K/uL  Mean Cell Volume : 86.2 fl  Mean Cell Hemoglobin : 29.2 pg  Mean Cell Hemoglobin Concentration : 33.9 gm/dL      Skin: Stage 2 Coccyx  Edema: 1+ L and R wrist    Nutrition focused physical exam previously conducted - found signs of malnutrition [ ]absent [ x]present    Subcutaneous fat loss: [ x] Orbital fat pads region, [x ]Buccal fat region, [ ]Triceps region,  [ x]Ribs region    Muscle wasting: [x ]Temples region, [x ]Clavicle region, [ x]Shoulder region, [ ]Scapula region, [ ]Interosseous region,  [ ]thigh region, [ ]Calf region      CURRENT NUTRITION DIAGNOSIS: Pt remains at nutrition risk secondary to Severe Protein Calorie Malnutrition (chronic) related to inability to meet sufficient protein-energy needs with poor appetite in setting of large pericardial effusion, left lung mass (suspicious for malignancy) and skin breakdown as evidenced by pt with severe muscle wasting/fat loss and meeting <75% estimated nutrient needs >1 month.  Patient and patient son present for interview. Patient presents with severe depletion, underweight and emaciated. Reports poor PO intake since admission and PTA. Son states that patient PO intake and status has decreased since he moved out of state. Patient lives alone. Patient reports liking Ensure drinks with ~100% consumption. Encouraged patient to order meals/snacks and provided kitchen contact information. Clarified that patient is with increased nutrition needs 2/2 current clinical status and malignancy. Patient and patient son in good understanding. RD to remain available.     Recommendations:   1) Continue Ensure TID (1050kcal,60gpro)  2) Continue supplementation MVI, Vit C daily  3) Provide encouragement with meals/snacks  4) Monitor nutrition related labs and hydration status; Monitor wts    Monitoring and Evaluation:   [x ] PO intake [x ] Tolerance to diet prescription [X] Weights  [X] Follow up per protocol [X] Labs:

## 2022-11-22 ENCOUNTER — TELEPHONE (OUTPATIENT)
Dept: FAMILY MEDICINE CLINIC | Facility: CLINIC | Age: 42
End: 2022-11-22

## 2022-11-30 ENCOUNTER — HOSPITAL ENCOUNTER (EMERGENCY)
Facility: HOSPITAL | Age: 42
Discharge: HOME | End: 2022-11-30
Attending: EMERGENCY MEDICINE

## 2022-11-30 ENCOUNTER — APPOINTMENT (EMERGENCY)
Dept: CT IMAGING | Facility: HOSPITAL | Age: 42
End: 2022-11-30

## 2022-11-30 VITALS
BODY MASS INDEX: 30.54 KG/M2 | TEMPERATURE: 98.3 F | HEART RATE: 83 BPM | OXYGEN SATURATION: 99 % | DIASTOLIC BLOOD PRESSURE: 71 MMHG | WEIGHT: 200.84 LBS | RESPIRATION RATE: 16 BRPM | SYSTOLIC BLOOD PRESSURE: 139 MMHG

## 2022-11-30 DIAGNOSIS — J06.9 URI (UPPER RESPIRATORY INFECTION): ICD-10-CM

## 2022-11-30 DIAGNOSIS — R31.9 HEMATURIA: ICD-10-CM

## 2022-11-30 DIAGNOSIS — R10.9 LEFT SIDED ABDOMINAL PAIN: Primary | ICD-10-CM

## 2022-11-30 DIAGNOSIS — N20.0 NEPHROLITHIASIS: ICD-10-CM

## 2022-11-30 LAB
ALBUMIN SERPL BCP-MCNC: 4.3 G/DL (ref 3.5–5)
ALP SERPL-CCNC: 61 U/L (ref 34–104)
ALT SERPL W P-5'-P-CCNC: 95 U/L (ref 7–52)
ANION GAP SERPL CALCULATED.3IONS-SCNC: 9 MMOL/L (ref 4–13)
AST SERPL W P-5'-P-CCNC: 47 U/L (ref 13–39)
BACTERIA UR QL AUTO: ABNORMAL /HPF
BASOPHILS # BLD AUTO: 0.03 THOUSANDS/ÂΜL (ref 0–0.1)
BASOPHILS NFR BLD AUTO: 1 % (ref 0–1)
BILIRUB SERPL-MCNC: 0.46 MG/DL (ref 0.2–1)
BILIRUB UR QL STRIP: NEGATIVE
BUN SERPL-MCNC: 18 MG/DL (ref 5–25)
CALCIUM SERPL-MCNC: 9.8 MG/DL (ref 8.4–10.2)
CAOX CRY URNS QL MICRO: ABNORMAL /HPF
CHLORIDE SERPL-SCNC: 105 MMOL/L (ref 96–108)
CLARITY UR: CLEAR
CO2 SERPL-SCNC: 25 MMOL/L (ref 21–32)
COLOR UR: YELLOW
CREAT SERPL-MCNC: 1 MG/DL (ref 0.6–1.3)
EOSINOPHIL # BLD AUTO: 0.1 THOUSAND/ÂΜL (ref 0–0.61)
EOSINOPHIL NFR BLD AUTO: 2 % (ref 0–6)
ERYTHROCYTE [DISTWIDTH] IN BLOOD BY AUTOMATED COUNT: 12.2 % (ref 11.6–15.1)
GFR SERPL CREATININE-BSD FRML MDRD: 93 ML/MIN/1.73SQ M
GLUCOSE SERPL-MCNC: 105 MG/DL (ref 65–140)
GLUCOSE UR STRIP-MCNC: NEGATIVE MG/DL
HCT VFR BLD AUTO: 43.9 % (ref 36.5–49.3)
HGB BLD-MCNC: 14.9 G/DL (ref 12–17)
HGB UR QL STRIP.AUTO: ABNORMAL
IMM GRANULOCYTES # BLD AUTO: 0.03 THOUSAND/UL (ref 0–0.2)
IMM GRANULOCYTES NFR BLD AUTO: 1 % (ref 0–2)
KETONES UR STRIP-MCNC: NEGATIVE MG/DL
LEUKOCYTE ESTERASE UR QL STRIP: NEGATIVE
LIPASE SERPL-CCNC: 28 U/L (ref 11–82)
LYMPHOCYTES # BLD AUTO: 2.18 THOUSANDS/ÂΜL (ref 0.6–4.47)
LYMPHOCYTES NFR BLD AUTO: 36 % (ref 14–44)
MCH RBC QN AUTO: 29.3 PG (ref 26.8–34.3)
MCHC RBC AUTO-ENTMCNC: 33.9 G/DL (ref 31.4–37.4)
MCV RBC AUTO: 86 FL (ref 82–98)
MONOCYTES # BLD AUTO: 0.44 THOUSAND/ÂΜL (ref 0.17–1.22)
MONOCYTES NFR BLD AUTO: 7 % (ref 4–12)
MUCOUS THREADS UR QL AUTO: ABNORMAL
NEUTROPHILS # BLD AUTO: 3.29 THOUSANDS/ÂΜL (ref 1.85–7.62)
NEUTS SEG NFR BLD AUTO: 53 % (ref 43–75)
NITRITE UR QL STRIP: NEGATIVE
NON-SQ EPI CELLS URNS QL MICRO: ABNORMAL /HPF
NRBC BLD AUTO-RTO: 0 /100 WBCS
PH UR STRIP.AUTO: 5.5 [PH]
PLATELET # BLD AUTO: 286 THOUSANDS/UL (ref 149–390)
PMV BLD AUTO: 9.4 FL (ref 8.9–12.7)
POTASSIUM SERPL-SCNC: 4.1 MMOL/L (ref 3.5–5.3)
PROT SERPL-MCNC: 7 G/DL (ref 6.4–8.4)
PROT UR STRIP-MCNC: ABNORMAL MG/DL
RBC # BLD AUTO: 5.08 MILLION/UL (ref 3.88–5.62)
RBC #/AREA URNS AUTO: ABNORMAL /HPF
SODIUM SERPL-SCNC: 139 MMOL/L (ref 135–147)
SP GR UR STRIP.AUTO: 1.04 (ref 1–1.03)
UROBILINOGEN UR STRIP-ACNC: <2 MG/DL
WBC # BLD AUTO: 6.07 THOUSAND/UL (ref 4.31–10.16)
WBC #/AREA URNS AUTO: ABNORMAL /HPF

## 2022-11-30 RX ORDER — KETOROLAC TROMETHAMINE 30 MG/ML
15 INJECTION, SOLUTION INTRAMUSCULAR; INTRAVENOUS ONCE
Status: COMPLETED | OUTPATIENT
Start: 2022-11-30 | End: 2022-11-30

## 2022-11-30 RX ORDER — NAPROXEN 500 MG/1
500 TABLET ORAL 2 TIMES DAILY PRN
Qty: 15 TABLET | Refills: 0 | Status: SHIPPED | OUTPATIENT
Start: 2022-11-30

## 2022-11-30 RX ADMIN — SODIUM CHLORIDE 1000 ML: 0.9 INJECTION, SOLUTION INTRAVENOUS at 06:00

## 2022-11-30 RX ADMIN — KETOROLAC TROMETHAMINE 15 MG: 30 INJECTION, SOLUTION INTRAMUSCULAR; INTRAVENOUS at 08:03

## 2022-11-30 NOTE — ED NOTES
This RN apologized to patient regarding the wait time to see a provider  No needs at this time, call bell within reach       Lakisha Timmons RN  11/30/22 9318

## 2022-11-30 NOTE — DISCHARGE INSTRUCTIONS
You may take naproxen 500 mg orally twice daily to help with discomfort  Drink plenty of fluids to stay well hydrated  Schedule follow-up appointment with your primary care physician and return as needed for any worsening

## 2022-11-30 NOTE — ED PROVIDER NOTES
History  Chief Complaint   Patient presents with   • Flank Pain     Pt reports intermittent left flank pain since yesterday, worse with movement  Reports cough and congestion x1 week also  (-)N/v/d     Patient is a 45-year-old male presents to the emergency department for evaluation with sharp abdominal discomfort  He gestures from the left upper abdomen to the left lower abdomen and relates that pain started yesterday without identified provocation  He describes the pain is “often on ” He does not notice anything that triggers the discomfort or helps to alleviate it  He did take 400 mg of ibuprofen approximately 4 hours ago which lessen the discomfort slightly  He does not notice any associated changes with urination such as increased frequency, presence of dysuria or hematuria  He denies having had any fevers, nausea, vomiting or bowel abnormality  He has had a nonproductive cough over the last week and relates that he has been experiencing a cold for awhile  This has not been worsening  He does not have any chest discomfort or dyspnea with this  Past medical history significant for hypertension and hepatic steatosis as well as nephrolithiasis  He describes having required lithotripsy on 1 occasion  He has never required intervention such as stenting  Sharp nature of pain is somewhat reminiscent to that of stones for him  Prior to Admission Medications   Prescriptions Last Dose Informant Patient Reported? Taking? PARoxetine (PAXIL) 10 mg tablet   No No   Sig: Take 1 tablet (10 mg total) by mouth daily   dicyclomine (BENTYL) 20 mg tablet   No No   Sig: TAKE 1 TABLET BY MOUTH 3 TIMES A DAY  omeprazole (PriLOSEC) 40 MG capsule   No No   Sig: TAKE 1 CAPSULE BY MOUTH 2 TIMES A DAY BEFORE MEALS     omeprazole (PriLOSEC) 40 MG capsule   No No   Sig: Take 1 capsule (40 mg total) by mouth daily   valsartan (DIOVAN) 160 mg tablet   No No   Sig: Take 1 tablet (160 mg total) by mouth daily Facility-Administered Medications: None       Past Medical History:   Diagnosis Date   • Depression    • Elevated liver enzymes    • Fatty liver    • Heartburn    • Hypertension 4/2021   • Psychiatric disorder     depression   • Renal disorder     stones       Past Surgical History:   Procedure Laterality Date   • COLONOSCOPY     • MA COLONOSCOPY FLX DX W/COLLJ SPEC WHEN PFRMD N/A 6/11/2018    Procedure: EGD AND COLONOSCOPY;  Surgeon: Charisma Miller MD;  Location: [facility]; Service: Gastroenterology   • MA FRAGMENT KIDNEY STONE/ ESWL Left 11/4/2016    Procedure: Rex Liz SHOCKWAVE (ESWL); Surgeon: Nichole Mendoza MD;  Location: [facility];  Service: Urology   • TOOTH EXTRACTION     • UPPER GASTROINTESTINAL ENDOSCOPY         Family History   Problem Relation Age of Onset   • Migraines Mother    • Depression Mother         Duane   • Crohn's disease Father    • Depression Father    • Colon cancer Father    • Cancer Father    • Liver disease Brother    • Tourette syndrome Brother    • Diabetes Paternal Grandmother      I have reviewed and agree with the history as documented  E-Cigarette/Vaping   • E-Cigarette Use Never User      E-Cigarette/Vaping Substances   • Nicotine No    • THC No    • CBD No    • Flavoring No    • Other No    • Unknown No      Social History     Tobacco Use   • Smoking status: Light Smoker     Packs/day: 0 25     Years: 20 00     Pack years: 5 00     Types: Cigarettes     Start date: 1/1/2001   • Smokeless tobacco: Never   Vaping Use   • Vaping Use: Never used   Substance Use Topics   • Alcohol use: Yes     Comment: socially   • Drug use: No       Review of Systems   All other systems reviewed and are negative  Physical Exam  Physical Exam  Vitals and nursing note reviewed  Constitutional:       Appearance: Normal appearance  He is not ill-appearing  Comments: Appears mildly uncomfortable   HENT:      Head: Normocephalic        Mouth/Throat: Mouth: Mucous membranes are moist    Eyes:      General: No scleral icterus  Extraocular Movements: Extraocular movements intact  Conjunctiva/sclera: Conjunctivae normal    Cardiovascular:      Rate and Rhythm: Normal rate and regular rhythm  Heart sounds: Normal heart sounds  Pulmonary:      Effort: Pulmonary effort is normal       Breath sounds: Normal breath sounds  Comments: Occasional nonproductive cough  Abdominal:      General: Bowel sounds are normal       Palpations: Abdomen is soft  Tenderness: There is abdominal tenderness (Diffuse abdominal-maximal left lower quadrant)  There is no right CVA tenderness or left CVA tenderness  Musculoskeletal:         General: Normal range of motion  Cervical back: Normal range of motion and neck supple  Skin:     General: Skin is warm and dry  Neurological:      General: No focal deficit present  Mental Status: He is alert and oriented to person, place, and time     Psychiatric:         Mood and Affect: Mood normal          Behavior: Behavior normal          Vital Signs  ED Triage Vitals [11/30/22 0533]   Temperature Pulse Respirations Blood Pressure SpO2   98 3 °F (36 8 °C) 93 16 139/71 96 %      Temp Source Heart Rate Source Patient Position - Orthostatic VS BP Location FiO2 (%)   Oral Monitor -- -- --      Pain Score       7           Vitals:    11/30/22 0533 11/30/22 0700   BP: 139/71    Pulse: 93 83         Visual Acuity      ED Medications  Medications   sodium chloride 0 9 % bolus 1,000 mL (0 mL Intravenous Stopped 11/30/22 0848)   ketorolac (TORADOL) injection 15 mg (15 mg Intravenous Given 11/30/22 0803)       Diagnostic Studies  Results Reviewed     Procedure Component Value Units Date/Time    Comprehensive metabolic panel [[id number]]  (Abnormal) Collected: 11/30/22 4810    Lab Status: Final result Specimen: Blood from Arm, Right Updated: 11/30/22 1063     Sodium 139 mmol/L      Potassium 4 1 mmol/L      Chloride 105 mmol/L      CO2 25 mmol/L      ANION GAP 9 mmol/L      BUN 18 mg/dL      Creatinine 1 00 mg/dL      Glucose 105 mg/dL      Calcium 9 8 mg/dL      AST 47 U/L      ALT 95 U/L      Alkaline Phosphatase 61 U/L      Total Protein 7 0 g/dL      Albumin 4 3 g/dL      Total Bilirubin 0 46 mg/dL      eGFR 93 ml/min/1 73sq m     Narrative:      National Kidney Disease Foundation guidelines for Chronic Kidney Disease (CKD):   •  Stage 1 with normal or high GFR (GFR > 90 mL/min/1 73 square meters)  •  Stage 2 Mild CKD (GFR = 60-89 mL/min/1 73 square meters)  •  Stage 3A Moderate CKD (GFR = 45-59 mL/min/1 73 square meters)  •  Stage 3B Moderate CKD (GFR = 30-44 mL/min/1 73 square meters)  •  Stage 4 Severe CKD (GFR = 15-29 mL/min/1 73 square meters)  •  Stage 5 End Stage CKD (GFR <15 mL/min/1 73 square meters)  Note: GFR calculation is accurate only with a steady state creatinine    Lipase [[id number]]  (Normal) Collected: 11/30/22 0557    Lab Status: Final result Specimen: Blood from Arm, Right Updated: 11/30/22 0632     Lipase 28 u/L     Urine Microscopic [[id number]]  (Abnormal) Collected: 11/30/22 0557    Lab Status: Final result Specimen: Urine, Clean Catch Updated: 11/30/22 0627     RBC, UA Innumerable /hpf      WBC, UA 2-4 /hpf      Epithelial Cells Occasional /hpf      Bacteria, UA None Seen /hpf      MUCUS THREADS Occasional     Ca Oxalate Anabelle, UA Occasional /hpf     UA w Reflex to Microscopic w Reflex to Culture [[id number]]  (Abnormal) Collected: 11/30/22 0557    Lab Status: Final result Specimen: Urine, Clean Catch Updated: 11/30/22 0620     Color, UA Yellow     Clarity, UA Clear     Specific Gravity, UA 1 040     pH, UA 5 5     Leukocytes, UA Negative     Nitrite, UA Negative     Protein, UA 30 (1+) mg/dl      Glucose, UA Negative mg/dl      Ketones, UA Negative mg/dl      Urobilinogen, UA <2 0 mg/dl      Bilirubin, UA Negative     Occult Blood, UA Large    CBC and differential [[id number]] Collected: 11/30/22 0557 Lab Status: Final result Specimen: Blood from Arm, Right Updated: 11/30/22 0609     WBC 6 07 Thousand/uL      RBC 5 08 Million/uL      Hemoglobin 14 9 g/dL      Hematocrit 43 9 %      MCV 86 fL      MCH 29 3 pg      MCHC 33 9 g/dL      RDW 12 2 %      MPV 9 4 fL      Platelets 196 Thousands/uL      nRBC 0 /100 WBCs      Neutrophils Relative 53 %      Immat GRANS % 1 %      Lymphocytes Relative 36 %      Monocytes Relative 7 %      Eosinophils Relative 2 %      Basophils Relative 1 %      Neutrophils Absolute 3 29 Thousands/µL      Immature Grans Absolute 0 03 Thousand/uL      Lymphocytes Absolute 2 18 Thousands/µL      Monocytes Absolute 0 44 Thousand/µL      Eosinophils Absolute 0 10 Thousand/µL      Basophils Absolute 0 03 Thousands/µL                  CT renal stone study abdomen pelvis without contrast   Final Result by Caitlin Reyes MD (11/30 2276)      No obstructive uropathy  4 mm left intrarenal calculus  Fatty liver  Workstation performed: [id number]                    Procedures  Procedures         ED Course  ED Course as of 11/30/22 2312   Wed Nov 30, 2022   0705 UA reveals innumerable red blood cell presence no evidence of infection-negative leukocyte esterase and nitrites without any bacteria presents  Renal function normal   LFTs elevated though 2 lesser degree than on many prior tests  History of hepatic steatosis   0711 Concern greatest for possible recurrent ureterolithiasis  Obtaining CT scan  Other intra-abdominal processes remain on differential given diffuse tenderness  2183 Patient feeling improved at this time  Study results reviewed  He is aware that he does have a left renal stone  Uncertain whether he may have passed a small stone to account for hematuria/discomfort  No other concerning findings  Feels well for discharge  Will follow-up PCP and return p r n  worsening                                               MDM    Disposition  Final diagnoses:   Left sided abdominal pain   Hematuria   Nephrolithiasis   URI (upper respiratory infection)     Time reflects when diagnosis was documented in both MDM as applicable and the Disposition within this note     Time User Action Codes Description Comment    11/30/2022  8:24 AM Martha Peat A Add [R10 9] Left sided abdominal pain     11/30/2022  8:24 AM Martha Peat A Add [R31 9] Hematuria     11/30/2022  8:24 AM Martha Peat A Add [N20 0] Nephrolithiasis     11/30/2022  8:24 AM Martha Peat A Add [J06 9] URI (upper respiratory infection)       ED Disposition     ED Disposition   Discharge    Condition   Stable    Date/Time   Wed Nov 30, 2022  8:24 AM    Comment   Nathanel Merle Duane discharge to home/self care  Follow-up Information     Follow up With Specialties Details Efraín Scott MD Family Medicine Schedule an appointment as soon as possible for a visit   [address]  [address] [address]  [phone]            Discharge Medication List as of 11/30/2022  8:26 AM      START taking these medications    Details   naproxen (Naprosyn) 500 mg tablet Take 1 tablet (500 mg total) by mouth 2 (two) times a day as needed for moderate pain, Starting Wed 11/30/2022, Normal         CONTINUE these medications which have NOT CHANGED    Details   dicyclomine (BENTYL) 20 mg tablet TAKE 1 TABLET BY MOUTH 3 TIMES A DAY , Normal      !! omeprazole (PriLOSEC) 40 MG capsule TAKE 1 CAPSULE BY MOUTH 2 TIMES A DAY BEFORE MEALS , Normal      !! omeprazole (PriLOSEC) 40 MG capsule Take 1 capsule (40 mg total) by mouth daily, Starting Mon 10/10/2022, Normal      PARoxetine (PAXIL) 10 mg tablet Take 1 tablet (10 mg total) by mouth daily, Starting Thu 10/20/2022, Normal      valsartan (DIOVAN) 160 mg tablet Take 1 tablet (160 mg total) by mouth daily, Starting Thu 2/17/2022, Normal       !! - Potential duplicate medications found   Please discuss with provider  No discharge procedures on file      PDMP Review       Value Time User    PDMP Reviewed  Yes 9/15/2021  3:39 AM Wei Carroll MD          ED Provider  Electronically Signed by           Cruz Jones MD  11/30/22 8241

## 2022-11-30 NOTE — Clinical Note
Hamilton Jackson was seen and treated in our emergency department on 11/30/2022  Diagnosis:     Edna Matt  may return to work on return date  He may return on this date: 12/01/2022         If you have any questions or concerns, please don't hesitate to call        Rosa Maria Casey RN    ______________________________           _______________          _______________  [facility] [place]                              Date                                Time

## 2023-01-08 DIAGNOSIS — K21.9 GASTROESOPHAGEAL REFLUX DISEASE WITHOUT ESOPHAGITIS: ICD-10-CM

## 2023-01-09 RX ORDER — OMEPRAZOLE 40 MG/1
40 CAPSULE, DELAYED RELEASE ORAL
Qty: 180 CAPSULE | Refills: 0 | Status: SHIPPED | OUTPATIENT
Start: 2023-01-09

## 2023-01-17 DIAGNOSIS — F32.A CHRONIC DEPRESSION: ICD-10-CM

## 2023-01-17 RX ORDER — PAROXETINE 10 MG/1
TABLET, FILM COATED ORAL
Qty: 90 TABLET | Refills: 0 | Status: SHIPPED | OUTPATIENT
Start: 2023-01-17

## 2023-01-23 ENCOUNTER — OFFICE VISIT (OUTPATIENT)
Dept: FAMILY MEDICINE CLINIC | Facility: CLINIC | Age: 43
End: 2023-01-23

## 2023-01-23 VITALS
WEIGHT: 200 LBS | TEMPERATURE: 98.4 F | HEART RATE: 92 BPM | SYSTOLIC BLOOD PRESSURE: 140 MMHG | OXYGEN SATURATION: 98 % | HEIGHT: 68 IN | RESPIRATION RATE: 16 BRPM | BODY MASS INDEX: 30.31 KG/M2 | DIASTOLIC BLOOD PRESSURE: 88 MMHG

## 2023-01-23 DIAGNOSIS — J06.9 ACUTE URI: Primary | ICD-10-CM

## 2023-01-23 RX ORDER — AZITHROMYCIN 250 MG/1
TABLET, FILM COATED ORAL
Qty: 6 TABLET | Refills: 0 | Status: SHIPPED | OUTPATIENT
Start: 2023-01-23 | End: 2023-01-28

## 2023-01-23 NOTE — PROGRESS NOTES
BMI Counseling: Body mass index is 30 41 kg/m²  The BMI is above normal  Nutrition recommendations include decreasing portion sizes, encouraging healthy choices of fruits and vegetables, consuming healthier snacks and moderation in carbohydrate intake  Exercise recommendations include exercising 3-5 times per week  No pharmacotherapy was ordered  Rationale for BMI follow-up plan is due to patient being overweight or obese  Assessment/Plan:         Problem List Items Addressed This Visit        Respiratory    Acute URI - Primary     Pt has had sxs for past 2 weeks  Had COVID test 2 days ago and was negative  Pt to inc rest and fluids  Take advil prn sore throat and mucinex DM bid  Relevant Medications    azithromycin (Zithromax) 250 mg tablet         Subjective:      Patient ID: Yazmin Chowdary is a 40 y o  male  Pt here for cough, sore throat, sinus congestion for past 2 weeks and not getting better  No fever or chills  No aches  Taking OTC meds and not helping  No sob  No HA  No N/V/D  The following portions of the patient's history were reviewed and updated as appropriate:   Past Medical History:  He has a past medical history of Depression, Elevated liver enzymes, Fatty liver, Heartburn, Hypertension (4/2021), Psychiatric disorder, and Renal disorder  ,  _______________________________________________________________________  Medical Problems:  does not have any pertinent problems on file ,  _______________________________________________________________________  Past Surgical History:   has a past surgical history that includes Tooth extraction; pr lithotripsy xtrcorp shock wave (Left, 11/4/2016); pr colonoscopy flx dx w/collj spec when pfrmd (N/A, 6/11/2018); Colonoscopy; and Upper gastrointestinal endoscopy  ,  _______________________________________________________________________  Family History:  family history includes Cancer in his father; Colon cancer in his father; Crohn's disease in his father; Depression in his father and mother; Diabetes in his paternal grandmother; Liver disease in his brother; Migraines in his mother; Tourette syndrome in his brother ,  _______________________________________________________________________  Social History:   reports that he has been smoking cigarettes and cigarettes  He started smoking about 22 years ago  He has a 5 00 pack-year smoking history  He has never used smokeless tobacco  He reports current alcohol use  He reports that he does not use drugs  ,  _______________________________________________________________________  Allergies:  has No Known Allergies     _______________________________________________________________________  Current Outpatient Medications   Medication Sig Dispense Refill   • azithromycin (Zithromax) 250 mg tablet Take 2 tablets (500 mg total) by mouth daily for 1 day, THEN 1 tablet (250 mg total) daily for 4 days  6 tablet 0   • dicyclomine (BENTYL) 20 mg tablet TAKE 1 TABLET BY MOUTH 3 TIMES A DAY  270 tablet 1   • naproxen (Naprosyn) 500 mg tablet Take 1 tablet (500 mg total) by mouth 2 (two) times a day as needed for moderate pain 15 tablet 0   • omeprazole (PriLOSEC) 40 MG capsule Take 1 capsule (40 mg total) by mouth 2 (two) times a day before meals 180 capsule 0   • PARoxetine (PAXIL) 10 mg tablet TAKE 1 TABLET BY MOUTH EVERY DAY 90 tablet 0   • valsartan (DIOVAN) 160 mg tablet Take 1 tablet (160 mg total) by mouth daily 90 tablet 3   • omeprazole (PriLOSEC) 40 MG capsule Take 1 capsule (40 mg total) by mouth daily 90 capsule 3     No current facility-administered medications for this visit      _______________________________________________________________________  Review of Systems   Constitutional: Negative for chills, fatigue and fever  HENT: Positive for congestion and sore throat  Negative for ear pain, postnasal drip, rhinorrhea, sinus pressure, sinus pain and trouble swallowing  Respiratory: Positive for cough  Negative for chest tightness, shortness of breath and wheezing  Cardiovascular: Negative for chest pain  Gastrointestinal: Negative for abdominal pain, diarrhea, nausea and vomiting  Musculoskeletal: Negative for arthralgias  Skin: Negative for rash  Neurological: Negative for dizziness and headaches  Objective:  Vitals:    01/23/23 1117   BP: 140/88   BP Location: Left arm   Patient Position: Sitting   Cuff Size: Standard   Pulse: 92   Resp: 16   Temp: 98 4 °F (36 9 °C)   TempSrc: Tympanic   SpO2: 98%   Weight: 90 7 kg (200 lb)   Height: 5' 8" (1 727 m)     Body mass index is 30 41 kg/m²  Physical Exam  Vitals and nursing note reviewed  Constitutional:       Appearance: He is well-developed  He is not ill-appearing or toxic-appearing  HENT:      Right Ear: Tympanic membrane and ear canal normal       Left Ear: Tympanic membrane and ear canal normal       Nose: Congestion present  No rhinorrhea  Mouth/Throat:      Mouth: Mucous membranes are moist  No oral lesions  Pharynx: Oropharynx is clear  Uvula midline  Posterior oropharyngeal erythema present  No oropharyngeal exudate or uvula swelling  Tonsils: No tonsillar exudate  Cardiovascular:      Rate and Rhythm: Normal rate and regular rhythm  Heart sounds: Normal heart sounds  No murmur heard  Pulmonary:      Effort: Pulmonary effort is normal  No respiratory distress  Breath sounds: Normal breath sounds  No wheezing or rhonchi  Musculoskeletal:      Cervical back: Normal range of motion and neck supple  Lymphadenopathy:      Cervical: No cervical adenopathy

## 2023-01-23 NOTE — ASSESSMENT & PLAN NOTE
Pt has had sxs for past 2 weeks  Had COVID test 2 days ago and was negative  Pt to inc rest and fluids  Take advil prn sore throat and mucinex DM bid

## 2023-02-02 ENCOUNTER — OFFICE VISIT (OUTPATIENT)
Dept: FAMILY MEDICINE CLINIC | Facility: CLINIC | Age: 43
End: 2023-02-02

## 2023-02-02 VITALS
HEART RATE: 86 BPM | HEIGHT: 68 IN | TEMPERATURE: 97.2 F | OXYGEN SATURATION: 98 % | RESPIRATION RATE: 14 BRPM | WEIGHT: 204 LBS | SYSTOLIC BLOOD PRESSURE: 160 MMHG | DIASTOLIC BLOOD PRESSURE: 108 MMHG | BODY MASS INDEX: 30.92 KG/M2

## 2023-02-02 DIAGNOSIS — I16.0 HYPERTENSIVE URGENCY: ICD-10-CM

## 2023-02-02 DIAGNOSIS — G43.119 INTRACTABLE MIGRAINE WITH AURA WITHOUT STATUS MIGRAINOSUS: Primary | ICD-10-CM

## 2023-02-02 DIAGNOSIS — I10 ESSENTIAL HYPERTENSION: ICD-10-CM

## 2023-02-02 RX ORDER — RIZATRIPTAN BENZOATE 10 MG/1
10 TABLET ORAL AS NEEDED
Qty: 18 TABLET | Refills: 0 | Status: SHIPPED | OUTPATIENT
Start: 2023-02-02 | End: 2023-02-09 | Stop reason: SDUPTHER

## 2023-02-02 RX ORDER — VALSARTAN 320 MG/1
320 TABLET ORAL DAILY
Qty: 90 TABLET | Refills: 0 | Status: SHIPPED | OUTPATIENT
Start: 2023-02-02

## 2023-02-03 NOTE — PROGRESS NOTES
Subjective:      Patient ID: Yevgeniy Corrales is a 40 y o  male  With history of hypertension presents with unilateral right sided headache for 3 days, has sensitivity to light,no previous history of migraines, however other family members (sister and mother)have migraines   Tried OTC NSAIDS without benefit    Migraine  Pertinent negatives include no abdominal pain, diarrhea, dizziness, eye redness, fever, numbness, rhinorrhea, sore throat or weakness  Past Medical History:   Diagnosis Date   • Depression    • Elevated liver enzymes    • Fatty liver    • Heartburn    • Hypertension 4/2021   • Psychiatric disorder     depression   • Renal disorder     stones       Family History   Problem Relation Age of Onset   • Migraines Mother    • Depression Mother         Duane   • Crohn's disease Father    • Depression Father    • Colon cancer Father    • Cancer Father    • Liver disease Brother    • Tourette syndrome Brother    • Diabetes Paternal Grandmother        Past Surgical History:   Procedure Laterality Date   • COLONOSCOPY     • [place] COLONOSCOPY FLX DX W/COLLJ SPEC WHEN PFRMD N/A 6/11/2018    Procedure: EGD AND COLONOSCOPY;  Surgeon: Anita Aguirre MD;  Location: [facility]; Service: Gastroenterology   • WA LITHOTRIPSY XTRCORP SHOCK WAVE Left 11/4/2016    Procedure: Aaliyah Oconnell SHOCKWAVE (ESWL); Surgeon: Venus Leung MD;  Location: [facility] OR;  Service: Urology   • TOOTH EXTRACTION     • UPPER GASTROINTESTINAL ENDOSCOPY          reports that he has been smoking cigarettes and cigarettes  He started smoking about 22 years ago  He has a 5 00 pack-year smoking history  He has never used smokeless tobacco  He reports current alcohol use  He reports that he does not use drugs        Current Outpatient Medications:   •  dicyclomine (BENTYL) 20 mg tablet, TAKE 1 TABLET BY MOUTH 3 TIMES A DAY , Disp: 270 tablet, Rfl: 1  •  naproxen (Naprosyn) 500 mg tablet, Take 1 tablet (500 mg total) by mouth 2 (two) times a day as needed for moderate pain, Disp: 15 tablet, Rfl: 0  •  omeprazole (PriLOSEC) 40 MG capsule, Take 1 capsule (40 mg total) by mouth 2 (two) times a day before meals, Disp: 180 capsule, Rfl: 0  •  PARoxetine (PAXIL) 10 mg tablet, TAKE 1 TABLET BY MOUTH EVERY DAY, Disp: 90 tablet, Rfl: 0  •  rizatriptan (Maxalt) 10 mg tablet, Take 1 tablet (10 mg total) by mouth as needed for migraine Take at the onset of migraine; if symptoms continue or return, may take another dose at least 2 hours after first dose  Take no more than 2 doses in a day , Disp: 18 tablet, Rfl: 0  •  valsartan (DIOVAN) 320 MG tablet, Take 1 tablet (320 mg total) by mouth daily, Disp: 90 tablet, Rfl: 0    The following portions of the patient's history were reviewed and updated as appropriate: allergies, current medications, past family history, past medical history, past social history, past surgical history and problem list     Review of Systems   Constitutional: Negative for chills and fever  HENT: Negative for congestion, rhinorrhea and sore throat  Eyes: Negative for discharge, redness and itching  Respiratory: Negative for chest tightness, shortness of breath and wheezing  Cardiovascular: Negative for chest pain and palpitations  Gastrointestinal: Negative for abdominal pain, constipation and diarrhea  Genitourinary: Negative for dysuria  Skin: Negative for pallor and rash  Neurological: Positive for headaches  Negative for dizziness, weakness and numbness           PHQ-2/9 Depression Screening    Little interest or pleasure in doing things: 0 - not at all  Feeling down, depressed, or hopeless: 0 - not at all  Trouble falling or staying asleep, or sleeping too much: 0 - not at all  Feeling tired or having little energy: 0 - not at all  Poor appetite or overeatin - not at all  Feeling bad about yourself - or that you are a failure or have let yourself or your family down: 0 - not at all  Trouble concentrating on things, such as reading the newspaper or watching television: 0 - not at all  Moving or speaking so slowly that other people could have noticed  Or the opposite - being so fidgety or restless that you have been moving around a lot more than usual: 0 - not at all  Thoughts that you would be better off dead, or of hurting yourself in some way: 0 - not at all  PHQ-9 Score: 0   PHQ-9 Interpretation: No or Minimal depression              Objective:    BP (!) 160/108 (BP Location: Left arm, Patient Position: Sitting, Cuff Size: Adult)   Pulse 86   Temp (!) 97 2 °F (36 2 °C) (Tympanic)   Resp 14   Ht 5' 8" (1 727 m)   Wt 92 5 kg (204 lb)   SpO2 98%   BMI 31 02 kg/m²      Physical Exam  Vitals and nursing note reviewed  Constitutional:       Appearance: Normal appearance  He is well-developed  HENT:      Head: Normocephalic and atraumatic  Right Ear: External ear normal       Left Ear: External ear normal       Nose: Nose normal    Eyes:      Conjunctiva/sclera: Conjunctivae normal       Pupils: Pupils are equal, round, and reactive to light  Cardiovascular:      Rate and Rhythm: Normal rate and regular rhythm  Heart sounds: Normal heart sounds  No murmur heard  No gallop  Pulmonary:      Effort: Pulmonary effort is normal  No respiratory distress  Breath sounds: Normal breath sounds  No wheezing or rales  Abdominal:      General: There is no distension  Palpations: Abdomen is soft  Tenderness: There is no abdominal tenderness  Musculoskeletal:         General: No tenderness or deformity  Cervical back: Normal range of motion and neck supple  Right lower leg: No edema  Left lower leg: No edema  Lymphadenopathy:      Cervical: No cervical adenopathy  Skin:     Coloration: Skin is not pale  Findings: No erythema or rash  Neurological:      General: No focal deficit present  Mental Status: He is alert  Mental status is at baseline  Cranial Nerves: No cranial nerve deficit  Motor: No weakness        Coordination: Coordination normal       Gait: Gait normal       Deep Tendon Reflexes: Reflexes normal    Psychiatric:         Mood and Affect: Mood normal          Behavior: Behavior normal            Recent Results (from the past 8736 hour(s))   Comprehensive metabolic panel    Collection Time: 05/20/22  4:10 PM   Result Value Ref Range    Sodium 140 135 - 147 mmol/L    Potassium 3 9 3 5 - 5 3 mmol/L    Chloride 106 96 - 108 mmol/L    CO2 26 21 - 32 mmol/L    ANION GAP 8 4 - 13 mmol/L    BUN 20 5 - 25 mg/dL    Creatinine 1 02 0 60 - 1 30 mg/dL    Glucose, Fasting 87 65 - 99 mg/dL    Calcium 9 3 8 4 - 10 2 mg/dL    AST 63 (H) 13 - 39 U/L     (H) 7 - 52 U/L    Alkaline Phosphatase 50 34 - 104 U/L    Total Protein 6 8 6 4 - 8 4 g/dL    Albumin 4 3 3 5 - 5 0 g/dL    Total Bilirubin 0 79 0 20 - 1 00 mg/dL    eGFR 91 ml/min/1 73sq m   Lipid panel    Collection Time: 05/20/22  4:10 PM   Result Value Ref Range    Cholesterol 230 (H) See Comment mg/dL    Triglycerides 163 (H) See Comment mg/dL    HDL, Direct 45 >=40 mg/dL    LDL Calculated 152 (H) 0 - 100 mg/dL    Non-HDL-Chol (CHOL-HDL) 185 mg/dl   POCT Rapid Covid Ag    Collection Time: 06/14/22  1:39 PM   Result Value Ref Range    POCT SARS-CoV-2 Ag Positive (A) Negative    VALID CONTROL Valid    CBC and differential    Collection Time: 11/30/22  5:57 AM   Result Value Ref Range    WBC 6 07 4 31 - 10 16 Thousand/uL    RBC 5 08 3 88 - 5 62 Million/uL    Hemoglobin 14 9 12 0 - 17 0 g/dL    Hematocrit 43 9 36 5 - 49 3 %    MCV 86 82 - 98 fL    MCH 29 3 26 8 - 34 3 pg    MCHC 33 9 31 4 - 37 4 g/dL    RDW 12 2 11 6 - 15 1 %    MPV 9 4 8 9 - 12 7 fL    Platelets 416 693 - 246 Thousands/uL    nRBC 0 /100 WBCs    Neutrophils Relative 53 43 - 75 %    Immat GRANS % 1 0 - 2 %    Lymphocytes Relative 36 14 - 44 %    Monocytes Relative 7 4 - 12 %    Eosinophils Relative 2 0 - 6 %    Basophils Relative 1 0 - 1 %    Neutrophils Absolute 3 29 1 85 - 7 62 Thousands/µL    Immature Grans Absolute 0 03 0 00 - 0 20 Thousand/uL    Lymphocytes Absolute 2 18 0 60 - 4 47 Thousands/µL    Monocytes Absolute 0 44 0 17 - 1 22 Thousand/µL    Eosinophils Absolute 0 10 0 00 - 0 61 Thousand/µL    Basophils Absolute 0 03 0 00 - 0 10 Thousands/µL   Comprehensive metabolic panel    Collection Time: 11/30/22  5:57 AM   Result Value Ref Range    Sodium 139 135 - 147 mmol/L    Potassium 4 1 3 5 - 5 3 mmol/L    Chloride 105 96 - 108 mmol/L    CO2 25 21 - 32 mmol/L    ANION GAP 9 4 - 13 mmol/L    BUN 18 5 - 25 mg/dL    Creatinine 1 00 0 60 - 1 30 mg/dL    Glucose 105 65 - 140 mg/dL    Calcium 9 8 8 4 - 10 2 mg/dL    AST 47 (H) 13 - 39 U/L    ALT 95 (H) 7 - 52 U/L    Alkaline Phosphatase 61 34 - 104 U/L    Total Protein 7 0 6 4 - 8 4 g/dL    Albumin 4 3 3 5 - 5 0 g/dL    Total Bilirubin 0 46 0 20 - 1 00 mg/dL    eGFR 93 ml/min/1 73sq m   Lipase    Collection Time: 11/30/22  5:57 AM   Result Value Ref Range    Lipase 28 11 - 82 u/L   UA w Reflex to Microscopic w Reflex to Culture    Collection Time: 11/30/22  5:57 AM    Specimen: Urine, Clean Catch   Result Value Ref Range    Color, UA Yellow     Clarity, UA Clear     Specific Gravity, UA 1 040 (H) 1 003 - 1 030    pH, UA 5 5 4 5, 5 0, 5 5, 6 0, 6 5, 7 0, 7 5, 8 0    Leukocytes, UA Negative Negative    Nitrite, UA Negative Negative    Protein, UA 30 (1+) (A) Negative mg/dl    Glucose, UA Negative Negative mg/dl    Ketones, UA Negative Negative mg/dl    Urobilinogen, UA <2 0 <2 0 mg/dl mg/dl    Bilirubin, UA Negative Negative    Occult Blood, UA Large (A) Negative   Urine Microscopic    Collection Time: 11/30/22  5:57 AM   Result Value Ref Range    RBC, UA Innumerable (A) None Seen, 1-2 /hpf    WBC, UA 2-4 (A) None Seen, 1-2 /hpf    Epithelial Cells Occasional None Seen, Occasional /hpf    Bacteria, UA None Seen None Seen, Occasional /hpf    MUCUS THREADS Occasional (A) None Seen    Ca Oxalate Anabelle, UA Occasional (A) None Seen /hpf       Laboratory Results: I have personally reviewed the pertinent laboratory results/reports     Radiology/Other Diagnostic Testing Results: I have personally reviewed pertinent reports  CT renal stone study abdomen pelvis without contrast    Result Date: 11/30/2022  CT ABDOMEN AND PELVIS WITHOUT IV CONTRAST - LOW DOSE RENAL STONE INDICATION:   Flank pain, kidney stone suspected Sharp left upper abdominal discomfort, hematuria, history nephrolithiasis  COMPARISON:  CT 12/3/2021  TECHNIQUE:  Low radiation dose thin section CT examination of the abdomen and pelvis was performed without intravenous or oral contrast according to a protocol specifically designed to evaluate for urinary tract calculus  Axial, sagittal, and coronal 2D  reformatted images were created from the source data and submitted for interpretation  Evaluation for pathology in the abdomen and pelvis that is unrelated to urinary tract calculi is limited  Radiation dose length product (DLP) for this visit:  172 mGy-cm   This examination, like all CT scans performed in the [facility], was performed utilizing techniques to minimize radiation dose exposure, including the use of iterative reconstruction and automated exposure control  URINARY TRACT FINDINGS: RIGHT KIDNEY AND URETER:  No urinary tract calculi  No hydronephrosis or hydroureter  LEFT KIDNEY AND URETER:  4 mm calculus in the lower pole  No ureteral calculi  No hydronephrosis or hydroureter  URINARY BLADDER:  Unremarkable  ADDITIONAL FINDINGS: LOWER CHEST:  No clinically significant abnormality identified in the visualized lower chest  SOLID VISCERA: Visualized portion of the liver appears to be diffusely decreased in density suggestive of steatosis  Otherwise, limited low radiation dose CT demonstrates no clinically significant abnormality of visualized solid abdominal viscera   GALLBLADDER/BILIARY TREE:  No calcified gallstones  No pericholecystic inflammatory change  No biliary dilatation  STOMACH AND BOWEL:  Unremarkable  APPENDIX:  No findings to suggest appendicitis  ABDOMINOPELVIC CAVITY:  No ascites  No pneumoperitoneum  No lymphadenopathy  REPRODUCTIVE ORGANS:  Unremarkable for patient's age  ABDOMINAL WALL/INGUINAL REGIONS:  Unremarkable  OSSEOUS STRUCTURES:  No acute fracture or destructive osseous lesion  No obstructive uropathy  4 mm left intrarenal calculus  Fatty liver  Workstation performed: [id number]        Assessment/Plan:  Problem List Items Addressed This Visit        Cardiovascular and Mediastinum    Essential hypertension    Relevant Medications    valsartan (DIOVAN) 320 MG tablet   Other Visit Diagnoses     Intractable migraine with aura without status migrainosus    -  Primary    Relevant Medications    rizatriptan (Maxalt) 10 mg tablet    Hypertensive urgency        Relevant Medications    valsartan (DIOVAN) 320 MG tablet              Normal neuro exam, BP is elevated 160/108- increase valsartan to 320 mg, he will take additional 160 mg after going home  Possible that BP is elevated due to NSAIDs use vs elevated due to pain  Advised to take rizatriptan first dose immediately and thereafter repeat in 2 hours if necessary if this does not abort the presumed migraine headache he should go to [address]  Patient verbalized understanding         Read package inserts for all medications before starting a new medications, call me if you have any questions  Patient was given opportunity to ask questions and all questions were answered  Disclaimer: Portions of the record may have been created with voice recognition software  Occasional wrong word or "sound a like" substitutions may have occurred due to the inherent limitations of voice recognition software  Read the chart carefully and recognize, using context, where substitutions have occurred   I have used the Epic copy/forward function to compose this note  I have reviewed my current note to ensure it reflects the current patient status, exam, assessment and plan

## 2023-02-06 ENCOUNTER — HOSPITAL ENCOUNTER (EMERGENCY)
Facility: HOSPITAL | Age: 43
Discharge: HOME | End: 2023-02-06
Attending: EMERGENCY MEDICINE

## 2023-02-06 ENCOUNTER — APPOINTMENT (EMERGENCY)
Dept: CT IMAGING | Facility: HOSPITAL | Age: 43
End: 2023-02-06

## 2023-02-06 ENCOUNTER — TELEPHONE (OUTPATIENT)
Dept: FAMILY MEDICINE CLINIC | Facility: CLINIC | Age: 43
End: 2023-02-06

## 2023-02-06 VITALS
SYSTOLIC BLOOD PRESSURE: 130 MMHG | RESPIRATION RATE: 16 BRPM | HEART RATE: 82 BPM | WEIGHT: 204.15 LBS | BODY MASS INDEX: 31.04 KG/M2 | OXYGEN SATURATION: 96 % | DIASTOLIC BLOOD PRESSURE: 59 MMHG | TEMPERATURE: 98.6 F

## 2023-02-06 DIAGNOSIS — R51.9 HEADACHE: Primary | ICD-10-CM

## 2023-02-06 RX ORDER — ONDANSETRON 4 MG/1
4 TABLET, ORALLY DISINTEGRATING ORAL ONCE
Status: COMPLETED | OUTPATIENT
Start: 2023-02-06 | End: 2023-02-06

## 2023-02-06 RX ORDER — IBUPROFEN 600 MG/1
600 TABLET ORAL ONCE
Status: COMPLETED | OUTPATIENT
Start: 2023-02-06 | End: 2023-02-06

## 2023-02-06 RX ORDER — ACETAMINOPHEN 325 MG/1
975 TABLET ORAL ONCE
Status: COMPLETED | OUTPATIENT
Start: 2023-02-06 | End: 2023-02-06

## 2023-02-06 RX ADMIN — ONDANSETRON 4 MG: 4 TABLET, ORALLY DISINTEGRATING ORAL at 01:23

## 2023-02-06 RX ADMIN — ACETAMINOPHEN 975 MG: 325 TABLET ORAL at 01:23

## 2023-02-06 RX ADMIN — IBUPROFEN 600 MG: 600 TABLET, FILM COATED ORAL at 01:23

## 2023-02-06 NOTE — DISCHARGE INSTRUCTIONS
Call your primary care provider in the morning to notify him of your visit to the ER  Please also call the number listed for neurology so they may schedule appointment for further evaluation with their specialty  Use 650 mg Tylenol and 600 mg of ibuprofen for mild to moderate headaches  Use your prescribed rizatriptan as directed for a moderate to severe headaches  Return to the ER if you develop fever, facial swelling, difficulty opening or closing your jaw, pain with chewing, vomiting, inability tolerate fluids, chest pain, lightheadedness, dizziness, or pain that is not responding to the medications you are prescribed

## 2023-02-06 NOTE — ED PROVIDER NOTES
History  Chief Complaint   Patient presents with   • Headache     Pt reports left sided headache since 1/30 that radiates into his jaw  Patient seen at [facility] and given rizatriptan  Patient States "my headache goes away when I take the medication, but always comes back"  Patient is a 81-year-old male with PMH of HTN presenting for evaluation of 1 week of left-sided headache  Patient notes he was seen by primary care and diagnosed with migraine headaches for which she was prescribed rizatriptan  He notes since being prescribed a medication that when he takes it it resolves his headaches  However, the headache returns which is concerning for him  On review of his chart, it was noted he was hypertensive at the primary care visit for which his valsartan was increased to 320 mg  Patient's been taking that dosage and denies new lightheadedness/dizziness, vision changes, chest pain, or shortness of breath  He describes his left-sided headache as at the temple radiating down to the left cheek, intermittent, aching/throbbing, and relieved with rizatriptan  He notes he was at work this evening when the headache started for which she took the rizatriptan  On arrival to the ED currently, patient notes the headache is most fully resolved  He denies recent illnesses, fevers, chills, sore throat, cough, abdominal pain, N/V/D, and skin changes  Expresses concern for dental infection given the pain radiates down to his left cheek  He has multiple teeth removed in the past and he has not seen a dentist recently        History provided by:  Patient   used: No    Migraine  Location:  L temple to L cheek  Quality:  Aching/throbbing  Severity:  Moderate  Onset quality:  Unable to specify  Duration:  1 week  Timing:  Intermittent  Progression:  Waxing and waning  Chronicity:  New  Relieved by:  Rizatriptan  Worsened by:  Not applicable  Associated symptoms: headaches and nausea    Associated symptoms: no abdominal pain, no chest pain (NA), no congestion, no cough, no diarrhea, no ear pain, no fatigue, no fever, no loss of consciousness, no myalgias, no rash, no rhinorrhea, no shortness of breath, no sore throat and no vomiting    Headaches:     Severity:  Moderate    Onset quality:  Unable to specify    Duration:  1 week    Timing:  Intermittent    Progression:  Waxing and waning    Chronicity:  New  Nausea:     Severity:  Mild    Onset quality:  Unable to specify    Duration:  1 week    Timing:  Intermittent    Progression:  Waxing and waning      Prior to Admission Medications   Prescriptions Last Dose Informant Patient Reported? Taking? PARoxetine (PAXIL) 10 mg tablet   No No   Sig: TAKE 1 TABLET BY MOUTH EVERY DAY   dicyclomine (BENTYL) 20 mg tablet   No No   Sig: TAKE 1 TABLET BY MOUTH 3 TIMES A DAY  naproxen (Naprosyn) 500 mg tablet   No No   Sig: Take 1 tablet (500 mg total) by mouth 2 (two) times a day as needed for moderate pain   omeprazole (PriLOSEC) 40 MG capsule   No No   Sig: Take 1 capsule (40 mg total) by mouth 2 (two) times a day before meals   rizatriptan (Maxalt) 10 mg tablet   No No   Sig: Take 1 tablet (10 mg total) by mouth as needed for migraine Take at the onset of migraine; if symptoms continue or return, may take another dose at least 2 hours after first dose  Take no more than 2 doses in a day     valsartan (DIOVAN) 320 MG tablet   No No   Sig: Take 1 tablet (320 mg total) by mouth daily      Facility-Administered Medications: None       Past Medical History:   Diagnosis Date   • Depression    • Elevated liver enzymes    • Fatty liver    • Heartburn    • Hypertension 4/2021   • Psychiatric disorder     depression   • Renal disorder     stones       Past Surgical History:   Procedure Laterality Date   • COLONOSCOPY     • NV COLONOSCOPY FLX DX W/COLLJ SPEC WHEN PFRMD N/A 6/11/2018    Procedure: EGD AND COLONOSCOPY;  Surgeon: Alverto Acosta MD;  Location: [facility]; Service: Gastroenterology   • HI LITHOTRIPSY XTRCORP SHOCK WAVE Left 11/4/2016    [facility]); Surgeon: Markell Snow MD;  Location: [facility];  Service: Urology   • TOOTH EXTRACTION     • UPPER GASTROINTESTINAL ENDOSCOPY         Family History   Problem Relation Age of Onset   • Migraines Mother    • Depression Mother         Duane   • Crohn's disease Father    • Depression Father    • Colon cancer Father    • Cancer Father    • Liver disease Brother    • Tourette syndrome Brother    • Diabetes Paternal Grandmother      I have reviewed and agree with the history as documented  E-Cigarette/Vaping   • E-Cigarette Use Never User      E-Cigarette/Vaping Substances   • Nicotine No    • THC No    • CBD No    • Flavoring No    • Other No    • Unknown No      Social History     Tobacco Use   • Smoking status: Light Smoker     Packs/day: 0 25     Years: 20 00     Pack years: 5 00     Types: Cigarettes     Start date: 1/1/2001   • Smokeless tobacco: Never   Vaping Use   • Vaping Use: Never used   Substance Use Topics   • Alcohol use: Yes     Comment: socially   • Drug use: No       Review of Systems   Constitutional: Negative for appetite change, chills, fatigue and fever  HENT: Positive for dental problem (Expresses concern for dental problem since pain in left cheek, however denies pain in mouth or with chewing)  Negative for congestion, ear pain, facial swelling, rhinorrhea, sinus pressure, sinus pain, sore throat and trouble swallowing  Respiratory: Negative for cough and shortness of breath  Cardiovascular: Negative for chest pain (NA)  Gastrointestinal: Positive for nausea  Negative for abdominal pain, diarrhea and vomiting  Musculoskeletal: Negative for back pain, gait problem and myalgias  Skin: Negative for color change, rash and wound  Allergic/Immunologic: Negative for immunocompromised state  Neurological: Positive for headaches   Negative for dizziness, tremors, seizures, loss of consciousness, syncope, facial asymmetry, speech difficulty, weakness, light-headedness and numbness  All other systems reviewed and are negative  Physical Exam  Physical Exam  Vitals and nursing note reviewed  Constitutional:       General: He is not in acute distress  Appearance: Normal appearance  He is well-developed and normal weight  He is not ill-appearing, toxic-appearing or diaphoretic  HENT:      Head: Normocephalic and atraumatic  Jaw: There is normal jaw occlusion  No trismus, swelling or pain on movement  Right Ear: Tympanic membrane, ear canal and external ear normal       Left Ear: Tympanic membrane, ear canal and external ear normal       Nose: Nose normal       Mouth/Throat:      Lips: Pink  No lesions  Mouth: Mucous membranes are moist       Pharynx: Oropharynx is clear  Uvula midline  Eyes:      General: Lids are normal  Vision grossly intact  Gaze aligned appropriately  No visual field deficit  Extraocular Movements: Extraocular movements intact  Right eye: Normal extraocular motion and no nystagmus  Left eye: Normal extraocular motion and no nystagmus  Conjunctiva/sclera: Conjunctivae normal       Pupils: Pupils are equal, round, and reactive to light  Neck:      Trachea: Trachea and phonation normal  No abnormal tracheal secretions  Cardiovascular:      Rate and Rhythm: Normal rate  Pulses: Normal pulses  Radial pulses are 2+ on the right side and 2+ on the left side  Dorsalis pedis pulses are 2+ on the right side and 2+ on the left side  Heart sounds: Normal heart sounds, S1 normal and S2 normal  No murmur heard  Pulmonary:      Effort: Pulmonary effort is normal  No tachypnea or respiratory distress  Breath sounds: Normal breath sounds and air entry  No stridor, decreased air movement or transmitted upper airway sounds  No decreased breath sounds     Abdominal: Palpations: Abdomen is soft  Tenderness: There is no abdominal tenderness  Musculoskeletal:         General: Normal range of motion  Cervical back: Full passive range of motion without pain, normal range of motion and neck supple  No rigidity  Right lower leg: No edema  Left lower leg: No edema  Skin:     General: Skin is warm and dry  Capillary Refill: Capillary refill takes less than 2 seconds  Findings: No rash or wound  Neurological:      General: No focal deficit present  Mental Status: He is alert and oriented to person, place, and time  Mental status is at baseline  GCS: GCS eye subscore is 4  GCS verbal subscore is 5  GCS motor subscore is 6  Cranial Nerves: Cranial nerves 2-12 are intact  Sensory: Sensation is intact  Motor: Motor function is intact  Coordination: Coordination is intact  Gait: Gait is intact           Vital Signs  ED Triage Vitals [02/06/23 0034]   Temperature Pulse Respirations Blood Pressure SpO2   98 6 °F (37 °C) 97 18 146/95 97 %      Temp Source Heart Rate Source Patient Position - Orthostatic VS BP Location FiO2 (%)   Oral Monitor Sitting Right arm --      Pain Score       No Pain           Vitals:    02/06/23 0034 02/06/23 0115 02/06/23 0145 02/06/23 0200   BP: 146/95 133/87 156/71 130/59   Pulse: 97 92 88 82   Patient Position - Orthostatic VS: Sitting Sitting Sitting Sitting         Visual Acuity  Visual Acuity    Flowsheet Row Most Recent Value   L Pupil Size (mm) 3   R Pupil Size (mm) 3          ED Medications  Medications   acetaminophen (TYLENOL) tablet 975 mg (975 mg Oral Given 2/6/23 0123)   ibuprofen (MOTRIN) tablet 600 mg (600 mg Oral Given 2/6/23 0123)   ondansetron (ZOFRAN-ODT) dispersible tablet 4 mg (4 mg Oral Given 2/6/23 0123)       Diagnostic Studies  Results Reviewed     None                 CT head without contrast   Final Result by David Partida MD (02/06 0147)      No acute intracranial abnormality  Workstation performed: [id number]                    Procedures  Procedures         ED Course  ED Course as of 02/08/23 0728   Mon Feb 06, 2023   0157 CT head without contrast  IMPRESSION:     No acute intracranial abnormality  0208 Patient updated on CT head results  He currently rates his headache as 2 out of 10  Plan made for discharge home with close follow-up with primary care as well as giving phone number for neurology if headaches persist   Patient agreeable plan has no further questions at this time  SBIRT 20yo+    Flowsheet Row Most Recent Value   SBIRT (25 yo +)    In order to provide better care to our patients, we are screening all of our patients for alcohol and drug use  Would it be okay to ask you these screening questions? No Filed at: 02/06/2023 0148                    Medical Decision Making  DDx including but not limited to: tension headache, cluster headache, migraine, sinusitis; doubt ICH, SAH, tumor, giant cell arteritis    Patient is a 16-year-old male with 1 week of left-sided temporal headaches  He was seen by his primary care provider who started him on rizatriptan for abortive therapy and increased his valsartan given he was hypertensive in the office setting  On evaluation today, his vital signs are stable and he is with improved BP readings  On arrival to the ED, patient had already taken his rizatriptan and notes that his headache is already starting to resolve  However, he is concerned for the frequency of these headaches  Given new finding of migraine headache x1 week with frequent recurrences, plan made for CT head imaging to rule out acute intracranial pathology such as tumor or hemorrhage  On exam, patient is neurologically intact  Giant cell arteritis very unlikely given absence of fever, visual disturbances, jaw claudication, and absence of tenderness to palpation of temporal arteries    Given patient's headache is improving and he has no other complaints or systemic signs or symptoms of infection, no indication to obtain basic blood work at this time  Last blood work obtained 3 months prior to arrival without acute abnormalities  Plan for Tylenol, ibuprofen, and Zofran given complaint of mild nausea and resolving headache  Headache: acute illness or injury  Amount and/or Complexity of Data Reviewed  Radiology: ordered  Decision-making details documented in ED Course  Risk  OTC drugs  Prescription drug management  Disposition  Final diagnoses:   Headache     Time reflects when diagnosis was documented in both MDM as applicable and the Disposition within this note     Time User Action Codes Description Comment    2/6/2023  2:13 AM Kristopher Ribeiro Add [R51 9] Headache       ED Disposition     ED Disposition   Discharge    Condition   Stable    Date/Time   Mon Feb 6, 2023  2:13 AM    Comment   Reita Power Duane discharge to home/self care                 Follow-up Information     Follow up With Specialties Details Why Contact Info Additional Information    Winston Ferguson MD [facility] Medicine Schedule an appointment as soon as possible for a visit in 2 days  [address]  [id number] [facility] [id number] [place] [address] Neurology [address] [facility] Neurology Call today  [address]  [address] [facility] Neurology [address] [facility], [address] [facility] [address], [place], [place], [place] [facility] Emergency Medicine Go to  If symptoms worsen [address] [facility] [address] [facility] Department, [address], [place], [place], [address]          Discharge Medication List as of 2/6/2023  2:16 AM      CONTINUE these medications which have NOT CHANGED    Details   dicyclomine (BENTYL) 20 mg tablet TAKE 1 TABLET BY MOUTH 3 TIMES A DAY , Normal      naproxen (Naprosyn) 500 mg tablet Take 1 tablet (500 mg total) by mouth 2 (two) times a day as needed for moderate pain, Starting Wed 11/30/2022, Normal      omeprazole (PriLOSEC) 40 MG capsule Take 1 capsule (40 mg total) by mouth 2 (two) times a day before meals, Starting Mon 1/9/2023, Normal      PARoxetine (PAXIL) 10 mg tablet TAKE 1 TABLET BY MOUTH EVERY DAY, Normal      rizatriptan (Maxalt) 10 mg tablet Take 1 tablet (10 mg total) by mouth as needed for migraine Take at the onset of migraine; if symptoms continue or return, may take another dose at least 2 hours after first dose  Take no more than 2 doses in a day , Starting u 2/2/2023, Normal      valsartan (DIOVAN) 320 MG tablet Take 1 tablet (320 mg total) by mouth daily, Starting Thu 2/2/2023, Normal             No discharge procedures on file      PDMP Review       Value Time User    PDMP Reviewed  Yes 2/2/2023  2:22 PM Stephen Colon MD          ED Provider  Electronically Signed by           Benny Millard  02/08/23 0339

## 2023-02-06 NOTE — Clinical Note
Abby Andrews was seen and treated in our emergency department on 2/6/2023  Diagnosis:     Janette Vines  is off the rest of the shift today, may return to work on return date  He may return on this date: 02/07/2023         If you have any questions or concerns, please don't hesitate to call        Brittani White    ______________________________           _______________          _______________  Hospital Representative                              Date                                Time

## 2023-02-09 DIAGNOSIS — G43.119 INTRACTABLE MIGRAINE WITH AURA WITHOUT STATUS MIGRAINOSUS: ICD-10-CM

## 2023-02-09 RX ORDER — RIZATRIPTAN BENZOATE 10 MG/1
10 TABLET ORAL AS NEEDED
Qty: 18 TABLET | Refills: 0 | Status: SHIPPED | OUTPATIENT
Start: 2023-02-09 | End: 2023-02-14 | Stop reason: SDUPTHER

## 2023-02-10 ENCOUNTER — NURSE TRIAGE (OUTPATIENT)
Dept: OTHER | Facility: OTHER | Age: 43
End: 2023-02-10

## 2023-02-10 DIAGNOSIS — K21.9 GASTROESOPHAGEAL REFLUX DISEASE WITHOUT ESOPHAGITIS: ICD-10-CM

## 2023-02-10 RX ORDER — OMEPRAZOLE 40 MG/1
CAPSULE, DELAYED RELEASE ORAL
Qty: 180 CAPSULE | Refills: 0 | Status: SHIPPED | OUTPATIENT
Start: 2023-02-10

## 2023-02-11 NOTE — TELEPHONE ENCOUNTER
Patient calling because he states that the pharmacy told him his refill of his migraine meds from yesterday is on hold  I asked him why it is on hold and he did not know  He has three pills left from the refill on 2/2/2023 when he got 18 pills  I told him to call the pharmacy and find out why and if it is something that we can help with to call us back  He will comply

## 2023-02-11 NOTE — TELEPHONE ENCOUNTER
Regarding: urgent med refill  ----- Message from Venkatesh Cano sent at 2/10/2023  7:02 PM EST -----  "I need my migraine pills, [facility] said [facility] on [facility] I really need it"

## 2023-02-14 ENCOUNTER — TELEPHONE (OUTPATIENT)
Dept: FAMILY MEDICINE CLINIC | Facility: CLINIC | Age: 43
End: 2023-02-14

## 2023-02-14 DIAGNOSIS — G43.119 INTRACTABLE MIGRAINE WITH AURA WITHOUT STATUS MIGRAINOSUS: ICD-10-CM

## 2023-02-14 RX ORDER — RIZATRIPTAN BENZOATE 10 MG/1
10 TABLET ORAL AS NEEDED
Qty: 9 TABLET | Refills: 0 | Status: SHIPPED | OUTPATIENT
Start: 2023-02-14

## 2023-02-14 NOTE — TELEPHONE ENCOUNTER
Insurance won't pay for Maxalt (too soon to be filled) patient wanted to know if there was anything we could do

## 2023-02-14 NOTE — TELEPHONE ENCOUNTER
Has a Neuro appt on 2/24, but in the mean time needs migraine medication refilled  Pharmacy won't refill, stating it's too soon  Can you refill Maxalt 10mg    [facility]

## 2023-02-16 ENCOUNTER — TELEPHONE (OUTPATIENT)
Dept: NEUROLOGY | Facility: CLINIC | Age: 43
End: 2023-02-16

## 2023-02-16 NOTE — TELEPHONE ENCOUNTER
Called and left a detailed message with patient rescheduling his appointment with Ena to 4/4/23 at 8:45 instead of 2/24/23  [place] [facility] doesn't have NP availability for 2/24/23

## 2023-02-24 ENCOUNTER — OFFICE VISIT (OUTPATIENT)
Dept: FAMILY MEDICINE CLINIC | Facility: CLINIC | Age: 43
End: 2023-02-24

## 2023-02-24 VITALS
HEART RATE: 88 BPM | DIASTOLIC BLOOD PRESSURE: 100 MMHG | WEIGHT: 203 LBS | OXYGEN SATURATION: 98 % | TEMPERATURE: 97.7 F | HEIGHT: 68 IN | RESPIRATION RATE: 18 BRPM | SYSTOLIC BLOOD PRESSURE: 140 MMHG | BODY MASS INDEX: 30.77 KG/M2

## 2023-02-24 DIAGNOSIS — G89.29 CHRONIC NONINTRACTABLE HEADACHE, UNSPECIFIED HEADACHE TYPE: Primary | ICD-10-CM

## 2023-02-24 DIAGNOSIS — E78.00 HYPERCHOLESTEROLEMIA: ICD-10-CM

## 2023-02-24 DIAGNOSIS — R51.9 CHRONIC NONINTRACTABLE HEADACHE, UNSPECIFIED HEADACHE TYPE: Primary | ICD-10-CM

## 2023-02-24 DIAGNOSIS — K21.9 GASTROESOPHAGEAL REFLUX DISEASE WITHOUT ESOPHAGITIS: ICD-10-CM

## 2023-02-24 DIAGNOSIS — I10 ESSENTIAL HYPERTENSION: ICD-10-CM

## 2023-02-24 PROBLEM — J06.9 ACUTE URI: Status: RESOLVED | Noted: 2021-03-02 | Resolved: 2023-02-24

## 2023-02-24 RX ORDER — DICLOFENAC POTASSIUM 50 MG/1
TABLET, FILM COATED ORAL
Qty: 30 TABLET | Refills: 1 | Status: SHIPPED | OUTPATIENT
Start: 2023-02-24

## 2023-02-24 RX ORDER — AMLODIPINE BESYLATE 5 MG/1
5 TABLET ORAL DAILY
Qty: 30 TABLET | Refills: 3 | Status: SHIPPED | OUTPATIENT
Start: 2023-02-24

## 2023-02-24 NOTE — PROGRESS NOTES
Assessment/Plan:         Problem List Items Addressed This Visit        Digestive    GERD (gastroesophageal reflux disease)     Stable  Continue omeprazole 40 mg bid and GERD diet  Pt had EGD in Oct 2021 by Dr Abby Iniguez and was ok  Cardiovascular and Mediastinum    Essential hypertension     BP has been high  Continue valsartan 320 mg qd and will add amlodipine 5 mg qd  Pt advised to continue low Na diet and to exercise on a regular basis  Relevant Medications    amLODIPine (NORVASC) 5 mg tablet    Other Relevant Orders    Comprehensive metabolic panel       Other    Hypercholesterolemia     Recheck lipids  Advised pt to follow a low cholesterol diet and to exercise on a regular basis  Relevant Orders    Lipid panel    Comprehensive metabolic panel    Chronic headaches - Primary     Pt has had HA daily for past 3-4 weeks and not going away  Was seen in ER on 2/6 and CT head was normal  Pt has been taking maxalt 10 mg daily and helps for awhile and RAMIREZ comes back  Pt also has left jaw pain in TMJ area and HA always on left side  At this point, I do not think that pt is having daily migraine and taking maxalt could be causing some component of rebound HA  Pt told to stop maxalt and will try diclofenac 50 mg tid for 1 week and then prn  Pt to see Neuro on 3/20  Relevant Medications    amLODIPine (NORVASC) 5 mg tablet    diclofenac potassium (CATAFLAM) 50 mg tablet         Subjective:      Patient ID: Delvin Galindo is a 40 y o  male  Pt here for daily headaches for past 3-4 weeks  Pt has been taking maxalt daily for them  Helps but HA comes back the next day  Pt has also been under increased stress due to the HA  Went to ER on 2/6 and had CT of head  It was normal  Has appt with Neuro on 3/20  BP has also been high and pt has left jaw pain         The following portions of the patient's history were reviewed and updated as appropriate:   Past Medical History:  He has a past medical history of Depression, Elevated liver enzymes, Fatty liver, Heartburn, Hypertension (4/2021), Psychiatric disorder, and Renal disorder  ,  _______________________________________________________________________  Medical Problems:  does not have any pertinent problems on file ,  _______________________________________________________________________  Past Surgical History:   has a past surgical history that includes Tooth extraction; pr lithotripsy xtrcorp shock wave (Left, 11/4/2016); pr colonoscopy flx dx w/collj spec when pfrmd (N/A, 6/11/2018); Colonoscopy; and Upper gastrointestinal endoscopy  ,  _______________________________________________________________________  Family History:  family history includes Cancer in his father; Colon cancer in his father; Crohn's disease in his father; Depression in his father and mother; Diabetes in his paternal grandmother; Liver disease in his brother; Migraines in his mother; Tourette syndrome in his brother ,  _______________________________________________________________________  Social History:   reports that he has been smoking cigarettes and cigarettes  He started smoking about 22 years ago  He has a 5 00 pack-year smoking history  He has never used smokeless tobacco  He reports current alcohol use  He reports that he does not use drugs  ,  _______________________________________________________________________  Allergies:  has No Known Allergies     _______________________________________________________________________  Current Outpatient Medications   Medication Sig Dispense Refill   • amLODIPine (NORVASC) 5 mg tablet Take 1 tablet (5 mg total) by mouth daily 30 tablet 3   • diclofenac potassium (CATAFLAM) 50 mg tablet Pt to take every 8 hrs for 7 days and then every 8 hrs as need for pain  Take with food  30 tablet 1   • dicyclomine (BENTYL) 20 mg tablet TAKE 1 TABLET BY MOUTH 3 TIMES A DAY   270 tablet 1   • omeprazole (PriLOSEC) 40 MG capsule TAKE 1 CAPSULE BY MOUTH 2 TIMES A DAY BEFORE MEALS  180 capsule 0   • PARoxetine (PAXIL) 10 mg tablet TAKE 1 TABLET BY MOUTH EVERY DAY 90 tablet 0   • rizatriptan (Maxalt) 10 mg tablet Take 1 tablet (10 mg total) by mouth as needed for migraine Take at the onset of migraine; if symptoms continue or return, may take another dose at least 2 hours after first dose  Take no more than 2 doses in a day  9 tablet 0   • valsartan (DIOVAN) 320 MG tablet Take 1 tablet (320 mg total) by mouth daily 90 tablet 0     No current facility-administered medications for this visit      _______________________________________________________________________  Review of Systems   Constitutional: Negative for fatigue and unexpected weight change  HENT:        Left jaw clicking and pain   Respiratory: Negative for cough and shortness of breath  Cardiovascular: Negative for chest pain  Gastrointestinal: Negative for abdominal pain, constipation, diarrhea and vomiting  Musculoskeletal: Negative for arthralgias  Neurological: Positive for headaches  Negative for dizziness  Psychiatric/Behavioral: Negative for dysphoric mood  The patient is nervous/anxious  Objective:  Vitals:    02/24/23 0953   BP: 140/100   BP Location: Left arm   Patient Position: Sitting   Cuff Size: Adult   Pulse: 88   Resp: 18   Temp: 97 7 °F (36 5 °C)   TempSrc: Tympanic   SpO2: 98%   Weight: 92 1 kg (203 lb)   Height: 5' 8" (1 727 m)     Body mass index is 30 87 kg/m²  Physical Exam  Vitals and nursing note reviewed  Constitutional:       Appearance: Normal appearance  He is well-developed and normal weight  HENT:      Mouth/Throat:      Comments: TTP left TMJ area  Neck:      Thyroid: No thyromegaly  Cardiovascular:      Rate and Rhythm: Normal rate and regular rhythm  Heart sounds: Normal heart sounds  No murmur heard  Pulmonary:      Effort: Pulmonary effort is normal  No respiratory distress  Breath sounds: Normal breath sounds  No wheezing  Musculoskeletal:      Cervical back: Normal range of motion and neck supple  Right lower leg: No edema  Left lower leg: No edema  Lymphadenopathy:      Cervical: No cervical adenopathy  Neurological:      Mental Status: He is alert and oriented to person, place, and time  Cranial Nerves: No cranial nerve deficit  Psychiatric:         Mood and Affect: Mood normal          Behavior: Behavior normal          Thought Content:  Thought content normal          Judgment: Judgment normal

## 2023-02-24 NOTE — ASSESSMENT & PLAN NOTE
Stable  Continue omeprazole 40 mg bid and GERD diet  Pt had EGD in Oct 2021 by Dr Alejo Falcon and was ok

## 2023-02-24 NOTE — ASSESSMENT & PLAN NOTE
BP has been high  Continue valsartan 320 mg qd and will add amlodipine 5 mg qd  Pt advised to continue low Na diet and to exercise on a regular basis

## 2023-02-24 NOTE — ASSESSMENT & PLAN NOTE
Pt has had HA daily for past 3-4 weeks and not going away  Was seen in ER on 2/6 and CT head was normal  Pt has been taking maxalt 10 mg daily and helps for awhile and RAMIREZ comes back  Pt also has left jaw pain in TMJ area and HA always on left side  At this point, I do not think that pt is having daily migraine and taking maxalt could be causing some component of rebound HA  Pt told to stop maxalt and will try diclofenac 50 mg tid for 1 week and then prn  Pt to see Neuro on 3/20

## 2023-03-03 ENCOUNTER — TELEPHONE (OUTPATIENT)
Dept: FAMILY MEDICINE CLINIC | Facility: CLINIC | Age: 43
End: 2023-03-03

## 2023-03-03 NOTE — TELEPHONE ENCOUNTER
Patient following medication regimen from last week, Sunday had a bad one, yesterday had a bad one  Today he is doing ok  He just wanted to report in

## 2023-03-06 ENCOUNTER — APPOINTMENT (OUTPATIENT)
Dept: LAB | Facility: CLINIC | Age: 43
End: 2023-03-06

## 2023-03-06 DIAGNOSIS — I10 ESSENTIAL HYPERTENSION: ICD-10-CM

## 2023-03-06 DIAGNOSIS — E78.00 HYPERCHOLESTEROLEMIA: ICD-10-CM

## 2023-03-06 LAB
ALBUMIN SERPL BCP-MCNC: 4.6 G/DL (ref 3.5–5)
ALP SERPL-CCNC: 48 U/L (ref 34–104)
ALT SERPL W P-5'-P-CCNC: 92 U/L (ref 7–52)
ANION GAP SERPL CALCULATED.3IONS-SCNC: 6 MMOL/L (ref 4–13)
AST SERPL W P-5'-P-CCNC: 43 U/L (ref 13–39)
BILIRUB SERPL-MCNC: 0.66 MG/DL (ref 0.2–1)
BUN SERPL-MCNC: 20 MG/DL (ref 5–25)
CALCIUM SERPL-MCNC: 9.6 MG/DL (ref 8.4–10.2)
CHLORIDE SERPL-SCNC: 103 MMOL/L (ref 96–108)
CHOLEST SERPL-MCNC: 249 MG/DL
CO2 SERPL-SCNC: 29 MMOL/L (ref 21–32)
CREAT SERPL-MCNC: 0.83 MG/DL (ref 0.6–1.3)
GFR SERPL CREATININE-BSD FRML MDRD: 109 ML/MIN/1.73SQ M
GLUCOSE P FAST SERPL-MCNC: 86 MG/DL (ref 65–99)
HDLC SERPL-MCNC: 43 MG/DL
NONHDLC SERPL-MCNC: 206 MG/DL
POTASSIUM SERPL-SCNC: 3.9 MMOL/L (ref 3.5–5.3)
PROT SERPL-MCNC: 7.3 G/DL (ref 6.4–8.4)
SODIUM SERPL-SCNC: 138 MMOL/L (ref 135–147)
TRIGL SERPL-MCNC: 438 MG/DL

## 2023-03-07 DIAGNOSIS — E78.00 HYPERCHOLESTEROLEMIA: Primary | ICD-10-CM

## 2023-03-07 RX ORDER — ATORVASTATIN CALCIUM 10 MG/1
10 TABLET, FILM COATED ORAL DAILY
COMMUNITY
End: 2023-03-07 | Stop reason: SDUPTHER

## 2023-03-07 RX ORDER — ATORVASTATIN CALCIUM 10 MG/1
10 TABLET, FILM COATED ORAL DAILY
Qty: 30 TABLET | Refills: 5 | Status: SHIPPED | OUTPATIENT
Start: 2023-03-07

## 2023-03-14 DIAGNOSIS — G43.119 INTRACTABLE MIGRAINE WITH AURA WITHOUT STATUS MIGRAINOSUS: ICD-10-CM

## 2023-03-14 RX ORDER — RIZATRIPTAN BENZOATE 10 MG/1
10 TABLET ORAL AS NEEDED
Qty: 9 TABLET | Refills: 0 | Status: SHIPPED | OUTPATIENT
Start: 2023-03-14

## 2023-03-21 ENCOUNTER — TELEMEDICINE (OUTPATIENT)
Dept: FAMILY MEDICINE CLINIC | Facility: CLINIC | Age: 43
End: 2023-03-21

## 2023-03-21 VITALS — BODY MASS INDEX: 30.87 KG/M2 | TEMPERATURE: 99.8 F | HEIGHT: 68 IN

## 2023-03-21 DIAGNOSIS — J06.9 ACUTE URI: Primary | ICD-10-CM

## 2023-03-21 RX ORDER — BROMPHENIRAMINE MALEATE, PSEUDOEPHEDRINE HYDROCHLORIDE, AND DEXTROMETHORPHAN HYDROBROMIDE 2; 30; 10 MG/5ML; MG/5ML; MG/5ML
5 SYRUP ORAL 4 TIMES DAILY PRN
Qty: 120 ML | Refills: 0 | Status: SHIPPED | OUTPATIENT
Start: 2023-03-21

## 2023-03-21 RX ORDER — AZITHROMYCIN 250 MG/1
TABLET, FILM COATED ORAL
Qty: 6 TABLET | Refills: 0 | Status: SHIPPED | OUTPATIENT
Start: 2023-03-21 | End: 2023-03-26

## 2023-03-21 NOTE — PROGRESS NOTES
Virtual Regular Visit    Verification of patient location:    Patient is located in the following state in which I hold an active license [place]      Assessment/Plan:    Problem List Items Addressed This Visit        Respiratory    Acute URI - Primary     Negative COVID test yesterday, has fever, nasal congestion with discolored sputum and sinus pressure and pain  Educate retest for COVID tomorrow as was less than 48 hrs since onset of symptom  Will send abx to pharmacy to start if test is negative  Recommend rest, increased fluids and f/u for worsening sxs  Work excuse provided  Relevant Medications    brompheniramine-pseudoephedrine-DM 30-2-10 MG/5ML syrup    azithromycin (Zithromax) 250 mg tablet            Reason for visit is   Chief Complaint   Patient presents with   • Cough   • Sinus Problem   • Fever   • Virtual Regular Visit        Encounter provider Farhana Glass [facility], [facility]    Provider located at [address] [facility]  [address] [place], [place] [id number] [phone]      Recent Visits  No visits were found meeting these conditions  Showing recent visits within past 7 days and meeting all other requirements  Today's Visits  Date Type Provider Dept   03/21/23 Telemedicine Anibal Isbell [place], [facility] today's visits and meeting all other requirements  Future Appointments  No visits were found meeting these conditions  Showing future appointments within next 150 days and meeting all other requirements       The patient was identified by name and date of birth  Tamra Renteria was informed that this is a telemedicine visit and that the visit is being conducted through the [employer]  He agrees to proceed     My office door was closed  No one else was in the room  He acknowledged consent and understanding of privacy and security of the video platform   The patient has agreed to participate and understands they can discontinue the visit at any time  Patient is aware this is a billable service  Subjective  Radha Vu is a 40 y o  male   Has h/a, sinus pressure and pain, fever, nasal congestion with yellow discharge and cough since yesterday  He has been using OTC medications which is not helping  He also tested for COVID and was negative  Educate need to retest after 48 hrs       Past Medical History:   Diagnosis Date   • Depression    • Elevated liver enzymes    • Fatty liver    • Heartburn    • Hypertension 4/2021   • Psychiatric disorder     depression   • Renal disorder     stones       Past Surgical History:   Procedure Laterality Date   • COLONOSCOPY     • PA COLONOSCOPY FLX DX W/COLLJ SPEC WHEN PFRMD N/A 6/11/2018    Procedure: EGD AND COLONOSCOPY;  Surgeon: Logan Wilcox MD;  Location: [facility]; Service: Gastroenterology   • PA LITHOTRIPSY XTRCORP SHOCK WAVE Left 11/4/2016    Procedure: Elke Bolton SHOCKWAVE (ESWL); Surgeon: Nicolasa Burleson MD;  Location: [facility];  Service: Urology   • TOOTH EXTRACTION     • UPPER GASTROINTESTINAL ENDOSCOPY         Current Outpatient Medications   Medication Sig Dispense Refill   • amLODIPine (NORVASC) 5 mg tablet Take 1 tablet (5 mg total) by mouth daily 30 tablet 3   • atorvastatin (LIPITOR) 10 mg tablet Take 1 tablet (10 mg total) by mouth daily 30 tablet 5   • azithromycin (Zithromax) 250 mg tablet Take 2 tablets (500 mg total) by mouth daily for 1 day, THEN 1 tablet (250 mg total) daily for 4 days  6 tablet 0   • brompheniramine-pseudoephedrine-DM 30-2-10 MG/5ML syrup Take 5 mL by mouth 4 (four) times a day as needed for congestion or cough 120 mL 0   • diclofenac potassium (CATAFLAM) 50 mg tablet Pt to take every 8 hrs for 7 days and then every 8 hrs as need for pain  Take with food  30 tablet 1   • dicyclomine (BENTYL) 20 mg tablet TAKE 1 TABLET BY MOUTH 3 TIMES A DAY   270 tablet 1   • omeprazole (PriLOSEC) 40 MG capsule TAKE 1 CAPSULE BY MOUTH 2 TIMES A DAY BEFORE MEALS  180 capsule 0   • PARoxetine (PAXIL) 10 mg tablet TAKE 1 TABLET BY MOUTH EVERY DAY 90 tablet 0   • rizatriptan (MAXALT) 10 mg tablet TAKE 1 TABLET (10 MG TOTAL) BY MOUTH AS NEEDED FOR MIGRAINE TAKE AT THE ONSET OF MIGRAINE; IF SYMPTOMS CONTINUE OR RETURN, MAY TAKE ANOTHER DOSE AT LEAST 2 HOURS AFTER FIRST DOSE  TAKE NO MORE THAN 2 DOSES IN A DAY  9 tablet 0   • valsartan (DIOVAN) 320 MG tablet Take 1 tablet (320 mg total) by mouth daily 90 tablet 0     No current facility-administered medications for this visit  No Known Allergies    Review of Systems   Constitutional: Positive for fatigue and fever  HENT: Positive for congestion, postnasal drip, sinus pressure and sinus pain  Respiratory: Positive for cough  Negative for chest tightness, shortness of breath and wheezing  Cardiovascular: Negative for chest pain and palpitations  Gastrointestinal: Negative for abdominal pain, nausea and vomiting  Musculoskeletal: Positive for myalgias  Neurological: Positive for headaches  Negative for dizziness  Video Exam    Vitals:    03/21/23 1317   Temp: 99 8 °F (37 7 °C)   TempSrc: Oral   Height: 5' 8" (1 727 m)       Physical Exam  Nursing note reviewed  Constitutional:       General: He is not in acute distress  Appearance: Normal appearance  He is obese  He is ill-appearing  He is not toxic-appearing or diaphoretic  HENT:      Nose: Congestion present  Eyes:      Conjunctiva/sclera: Conjunctivae normal    Pulmonary:      Effort: Pulmonary effort is normal  No respiratory distress  Neurological:      Mental Status: He is alert and oriented to person, place, and time     Psychiatric:         Mood and Affect: Mood normal          Behavior: Behavior normal           I spent 6 minutes directly with the patient during this visit

## 2023-03-21 NOTE — ASSESSMENT & PLAN NOTE
Negative COVID test yesterday, has fever, nasal congestion with discolored sputum and sinus pressure and pain  Educate retest for COVID tomorrow as was less than 48 hrs since onset of symptom  Will send abx to pharmacy to start if test is negative  Recommend rest, increased fluids and f/u for worsening sxs  Work excuse provided

## 2023-03-21 NOTE — LETTER
March 21, 2023     Patient: Erich Lao  YOB: 1980  Date of Visit: 3/21/2023      To Whom it May Concern:    Elizabeth Cao is under my professional care  Nadeen Due was seen in my office on 3/21/2023  Please excuse from work 03/21-03/22  Nan Due may return to work on thursday 03/23/23  If you have any questions or concerns, please don't hesitate to call           Sincerely,          RICHIE Adan

## 2023-03-23 ENCOUNTER — OFFICE VISIT (OUTPATIENT)
Dept: FAMILY MEDICINE CLINIC | Facility: CLINIC | Age: 43
End: 2023-03-23

## 2023-03-23 VITALS
HEIGHT: 68 IN | WEIGHT: 198.6 LBS | BODY MASS INDEX: 30.1 KG/M2 | DIASTOLIC BLOOD PRESSURE: 88 MMHG | TEMPERATURE: 98.1 F | HEART RATE: 100 BPM | SYSTOLIC BLOOD PRESSURE: 125 MMHG

## 2023-03-23 DIAGNOSIS — J06.9 ACUTE URI: Primary | ICD-10-CM

## 2023-03-23 NOTE — ASSESSMENT & PLAN NOTE
Was seen 3/21 for same symptoms  COVID retest was negative started z-cong states symptoms slightly improved but still has alot of body aches and coughing  He is afebrile and taking Bromfed DM for the cough he still has two more day remaining of the abx  Lungs are clear, educate medication continues to work up to 5 days after completion  Recommend rest, increased hydration, tylenol/ibuprofen as needed for pain  He is requesting note for work

## 2023-03-23 NOTE — PROGRESS NOTES
Name: Renny Solorzano      : 1980      MRN: [id number]  Encounter Provider: RICHIE Mathias  Encounter Date: 3/23/2023   Encounter department: [address] [place] [address] MEDICINE    Assessment & Plan     1  Acute URI  Assessment & Plan:  Was seen 3/21 for same symptoms  COVID retest was negative started z-cong states symptoms slightly improved but still has alot of body aches and coughing  He is afebrile and taking Bromfed DM for the cough he still has two more day remaining of the abx  Lungs are clear, educate medication continues to work up to 5 days after completion  Recommend rest, increased hydration, tylenol/ibuprofen as needed for pain  He is requesting note for work  Subjective      F/u for acute URI  He had VV on Tuesday advised to retest for COVID and started on z-cong and Bromfed DM  Repeat COVID test was negative, he reports feeling slightly better but has body aches and cough  Wanted to have lungs check and needs a note for work    Review of Systems   Constitutional: Positive for chills and fatigue  Negative for activity change and fever  HENT: Positive for congestion and sore throat  Negative for ear discharge, facial swelling, postnasal drip, sinus pressure and sinus pain  Respiratory: Positive for cough  Negative for chest tightness, shortness of breath and wheezing  Cardiovascular: Negative for chest pain  Gastrointestinal: Negative for abdominal pain, diarrhea, nausea and vomiting  Musculoskeletal: Positive for myalgias  Negative for arthralgias  Skin: Negative for color change  Allergic/Immunologic: Positive for environmental allergies  Neurological: Positive for facial asymmetry  Psychiatric/Behavioral: Negative for agitation, self-injury, sleep disturbance and suicidal ideas         Current Outpatient Medications on File Prior to Visit   Medication Sig   • amLODIPine (NORVASC) 5 mg tablet Take 1 tablet (5 mg total) by mouth daily   • atorvastatin (LIPITOR) 10 mg tablet Take 1 tablet (10 mg total) by mouth daily   • azithromycin (Zithromax) 250 mg tablet Take 2 tablets (500 mg total) by mouth daily for 1 day, THEN 1 tablet (250 mg total) daily for 4 days  • brompheniramine-pseudoephedrine-DM 30-2-10 MG/5ML syrup Take 5 mL by mouth 4 (four) times a day as needed for congestion or cough   • diclofenac potassium (CATAFLAM) 50 mg tablet Pt to take every 8 hrs for 7 days and then every 8 hrs as need for pain  Take with food  • dicyclomine (BENTYL) 20 mg tablet TAKE 1 TABLET BY MOUTH 3 TIMES A DAY  • omeprazole (PriLOSEC) 40 MG capsule TAKE 1 CAPSULE BY MOUTH 2 TIMES A DAY BEFORE MEALS  • PARoxetine (PAXIL) 10 mg tablet TAKE 1 TABLET BY MOUTH EVERY DAY   • rizatriptan (MAXALT) 10 mg tablet TAKE 1 TABLET (10 MG TOTAL) BY MOUTH AS NEEDED FOR MIGRAINE TAKE AT THE ONSET OF MIGRAINE; IF SYMPTOMS CONTINUE OR RETURN, MAY TAKE ANOTHER DOSE AT LEAST 2 HOURS AFTER FIRST DOSE  TAKE NO MORE THAN 2 DOSES IN A DAY  • valsartan (DIOVAN) 320 MG tablet Take 1 tablet (320 mg total) by mouth daily       Objective     /88 (BP Location: Left arm, Patient Position: Sitting, Cuff Size: Adult)   Pulse 100   Temp 98 1 °F (36 7 °C) (Temporal)   Ht 5' 8" (1 727 m)   Wt 90 1 kg (198 lb 9 6 oz)   BMI 30 20 kg/m²     Physical Exam  Vitals and nursing note reviewed  Constitutional:       General: He is not in acute distress  Appearance: Normal appearance  He is well-developed  He is obese  He is not ill-appearing, toxic-appearing or diaphoretic  HENT:      Head: Normocephalic and atraumatic  Right Ear: Tympanic membrane, ear canal and external ear normal  No drainage, swelling or tenderness  No middle ear effusion  There is no impacted cerumen  Tympanic membrane is not erythematous  Left Ear: Tympanic membrane, ear canal and external ear normal  No swelling or tenderness  No middle ear effusion  There is no impacted cerumen   Tympanic membrane is not erythematous  Nose: Congestion present  No rhinorrhea  Mouth/Throat:      Mouth: Mucous membranes are moist       Pharynx: No pharyngeal swelling, oropharyngeal exudate, posterior oropharyngeal erythema or uvula swelling  Tonsils: No tonsillar exudate or tonsillar abscesses  Eyes:      General:         Right eye: No discharge  Left eye: No discharge  Extraocular Movements: Extraocular movements intact  Conjunctiva/sclera: Conjunctivae normal       Pupils: Pupils are equal, round, and reactive to light  Neck:      Vascular: No carotid bruit  Cardiovascular:      Rate and Rhythm: Normal rate and regular rhythm  Pulses: Normal pulses  Heart sounds: Normal heart sounds  No murmur heard  Pulmonary:      Effort: Pulmonary effort is normal  No respiratory distress  Breath sounds: Normal breath sounds  No stridor  No wheezing, rhonchi or rales  Chest:      Chest wall: No tenderness  Abdominal:      General: Bowel sounds are normal  There is no distension  Palpations: Abdomen is soft  There is no mass  Tenderness: There is no abdominal tenderness  There is no right CVA tenderness, left CVA tenderness or guarding  Musculoskeletal:         General: No swelling, tenderness, deformity or signs of injury  Normal range of motion  Cervical back: Normal range of motion  No rigidity or tenderness  Right lower leg: No edema  Left lower leg: No edema  Lymphadenopathy:      Cervical: No cervical adenopathy  Skin:     General: Skin is warm  Capillary Refill: Capillary refill takes less than 2 seconds  Coloration: Skin is not jaundiced  Findings: No bruising, erythema or rash  Neurological:      General: No focal deficit present  Mental Status: He is alert and oriented to person, place, and time  Cranial Nerves: No cranial nerve deficit  Sensory: No sensory deficit        Coordination: Coordination normal  Psychiatric:         Mood and Affect: Mood normal          Behavior: Behavior normal          Thought Content:  Thought content normal        RICHIE Porter

## 2023-03-23 NOTE — LETTER
March 23, 2023     Patient: Kolby Krishnamurthy  YOB: 1980  Date of Visit: 3/23/2023      To Whom it May Concern:    Bailey Lopez is under my professional care  Pastor Morrison was seen in my office on 3/23/2023  Please excuse from work 03/23-03-24/23 Pastor Morrison may return to work on Monday 03/27/23  If you have any questions or concerns, please don't hesitate to call           Sincerely,          RICHIE Perry

## 2023-03-26 DIAGNOSIS — G89.29 CHRONIC NONINTRACTABLE HEADACHE, UNSPECIFIED HEADACHE TYPE: ICD-10-CM

## 2023-03-26 DIAGNOSIS — R51.9 CHRONIC NONINTRACTABLE HEADACHE, UNSPECIFIED HEADACHE TYPE: ICD-10-CM

## 2023-03-27 DIAGNOSIS — K58.0 IRRITABLE BOWEL SYNDROME WITH DIARRHEA: ICD-10-CM

## 2023-03-27 RX ORDER — DICYCLOMINE HCL 20 MG
20 TABLET ORAL 3 TIMES DAILY
Qty: 270 TABLET | Refills: 1 | Status: SHIPPED | OUTPATIENT
Start: 2023-03-27

## 2023-03-27 RX ORDER — DICLOFENAC POTASSIUM 50 MG/1
TABLET, FILM COATED ORAL
Qty: 30 TABLET | Refills: 1 | Status: SHIPPED | OUTPATIENT
Start: 2023-03-27

## 2023-03-29 DIAGNOSIS — E78.00 HYPERCHOLESTEROLEMIA: ICD-10-CM

## 2023-03-29 RX ORDER — ATORVASTATIN CALCIUM 10 MG/1
TABLET, FILM COATED ORAL
Qty: 90 TABLET | Refills: 2 | Status: SHIPPED | OUTPATIENT
Start: 2023-03-29

## 2023-04-15 PROBLEM — F95.2 TOURETTE'S DISORDER: Status: ACTIVE | Noted: 2023-04-15

## 2023-04-15 PROBLEM — G43.109 MIGRAINE WITH AURA AND WITHOUT STATUS MIGRAINOSUS, NOT INTRACTABLE: Status: ACTIVE | Noted: 2023-04-15

## 2023-05-18 DIAGNOSIS — I10 ESSENTIAL HYPERTENSION: ICD-10-CM

## 2023-05-18 RX ORDER — AMLODIPINE BESYLATE 5 MG/1
5 TABLET ORAL DAILY
Qty: 90 TABLET | Refills: 2 | Status: SHIPPED | OUTPATIENT
Start: 2023-05-18

## 2023-05-20 PROBLEM — J06.9 ACUTE URI: Status: RESOLVED | Noted: 2021-03-02 | Resolved: 2023-05-20

## 2023-06-03 ENCOUNTER — HOSPITAL ENCOUNTER (OUTPATIENT)
Dept: MRI IMAGING | Facility: HOSPITAL | Age: 43
Discharge: HOME | End: 2023-06-03
Attending: PSYCHIATRY & NEUROLOGY
Payer: COMMERCIAL

## 2023-06-03 DIAGNOSIS — G43.109 MIGRAINE WITH AURA AND WITHOUT STATUS MIGRAINOSUS, NOT INTRACTABLE: ICD-10-CM

## 2023-06-03 PROCEDURE — G1004 CDSM NDSC: HCPCS

## 2023-06-03 PROCEDURE — 70553 MRI BRAIN STEM W/O & W/DYE: CPT

## 2023-06-03 PROCEDURE — A9585 GADOBUTROL INJECTION: HCPCS | Performed by: PSYCHIATRY & NEUROLOGY

## 2023-06-03 RX ADMIN — GADOBUTROL 9 ML: 604.72 INJECTION INTRAVENOUS at 15:29

## 2023-06-11 DIAGNOSIS — E34.8 PINEAL GLAND CYST: Primary | ICD-10-CM

## 2023-06-12 ENCOUNTER — TELEPHONE (OUTPATIENT)
Dept: NEUROLOGY | Facility: CLINIC | Age: 43
End: 2023-06-12

## 2023-06-12 NOTE — TELEPHONE ENCOUNTER
----- Message from Kadi Tim MD sent at 6/11/2023  2:19 PM EDT -----  Please review abnormal MRI with the patient- he has Pineal gland cyst with repeated imaging advised in 6 months - please offer in-office or VV with me to discuss findings

## 2023-06-12 NOTE — TELEPHONE ENCOUNTER
Called and Left a message on pt's answering machine for a call back      No consent to leave detailed msg on file

## 2023-06-16 DIAGNOSIS — K21.9 GASTROESOPHAGEAL REFLUX DISEASE WITHOUT ESOPHAGITIS: ICD-10-CM

## 2023-06-16 RX ORDER — OMEPRAZOLE 40 MG/1
40 CAPSULE, DELAYED RELEASE ORAL
Qty: 180 CAPSULE | Refills: 0 | Status: SHIPPED | OUTPATIENT
Start: 2023-06-16

## 2023-06-16 NOTE — TELEPHONE ENCOUNTER
Pt made aware, he verbalizes understanding  Pt agreeable to f/u visit to discuss findings  Would like in office visit with Dr CEDILLO at [place] office only  He accepted first available appt on 9/20/23  Did offer to schedule sooner virtual appt, pt declined

## 2023-06-19 ENCOUNTER — OFFICE VISIT (OUTPATIENT)
Dept: FAMILY MEDICINE CLINIC | Facility: CLINIC | Age: 43
End: 2023-06-19
Payer: COMMERCIAL

## 2023-06-19 VITALS
SYSTOLIC BLOOD PRESSURE: 122 MMHG | OXYGEN SATURATION: 99 % | WEIGHT: 200 LBS | TEMPERATURE: 97.3 F | HEART RATE: 88 BPM | RESPIRATION RATE: 16 BRPM | BODY MASS INDEX: 30.31 KG/M2 | DIASTOLIC BLOOD PRESSURE: 82 MMHG | HEIGHT: 68 IN

## 2023-06-19 DIAGNOSIS — J06.9 ACUTE URI: Primary | ICD-10-CM

## 2023-06-19 LAB — S PYO AG THROAT QL: NEGATIVE

## 2023-06-19 PROCEDURE — 99213 OFFICE O/P EST LOW 20 MIN: CPT | Performed by: FAMILY MEDICINE

## 2023-06-19 PROCEDURE — 87880 STREP A ASSAY W/OPTIC: CPT | Performed by: FAMILY MEDICINE

## 2023-06-19 RX ORDER — AZITHROMYCIN 250 MG/1
TABLET, FILM COATED ORAL
Qty: 6 TABLET | Refills: 0 | Status: SHIPPED | OUTPATIENT
Start: 2023-06-19 | End: 2023-06-24

## 2023-06-19 RX ORDER — BENZONATATE 200 MG/1
200 CAPSULE ORAL 3 TIMES DAILY PRN
Qty: 30 CAPSULE | Refills: 0 | Status: SHIPPED | OUTPATIENT
Start: 2023-06-19

## 2023-06-19 NOTE — PROGRESS NOTES
Name: Sarah Tejada      : 1980      MRN: [id number]  Encounter Provider: Juan Navarro MD  Encounter Date: 2023   Encounter department: [address] [place] [address] MEDICINE    Assessment & Plan     1  Acute URI  Assessment & Plan:  persisitent will start zpak and benzonatate    Orders:  -     POCT rapid strepA  -     benzonatate (TESSALON) 200 MG capsule; Take 1 capsule (200 mg total) by mouth 3 (three) times a day as needed for cough  -     azithromycin (ZITHROMAX) 250 mg tablet; Take 2 tablets today then 1 tablet daily x 4 days           Subjective      HPI pt with 1 5 weeks cough itchy throat no fever no chills feels it started with poor air quality  Does get allergies on  claritin   Review of Systems   Constitutional: Negative for chills and fever  HENT: Positive for sore throat (irritated throat )  Negative for congestion, ear pain, mouth sores, rhinorrhea, sinus pressure, sinus pain and trouble swallowing  Eyes: Negative for discharge  Respiratory: Positive for cough  Negative for chest tightness and shortness of breath  Cardiovascular: Negative for chest pain  Musculoskeletal: Negative for arthralgias and myalgias  Neurological: Negative for headaches  Current Outpatient Medications on File Prior to Visit   Medication Sig   • amLODIPine (NORVASC) 5 mg tablet TAKE 1 TABLET (5 MG TOTAL) BY MOUTH DAILY  • atorvastatin (LIPITOR) 10 mg tablet TAKE 1 TABLET BY MOUTH EVERY DAY   • Cyanocobalamin 1000 MCG SUBL Place 1 tablet (1,000 mcg total) under the tongue daily   • diclofenac potassium (CATAFLAM) 50 mg tablet TAKE 1 TABLET EVERY 8 HOURS FOR 7 DAYS AND THEN EVERY 8 HOURS AS NEED FOR PAIN  TAKE WITH FOOD     • dicyclomine (BENTYL) 20 mg tablet Take 1 tablet (20 mg total) by mouth 3 (three) times a day   • ketorolac (TORADOL) 10 mg tablet Take 1 tablet (10 mg total) by mouth every 6 (six) hours as needed for moderate pain   • LORazepam (ATIVAN) 1 mg tablet Take 1 tab 40 min "prior to MRI with a second tab 10 min prior to imaging   • magnesium Oxide (MAG-OX) 400 mg TABS Take 1 tablet (400 mg total) by mouth daily   • omeprazole (PriLOSEC) 40 MG capsule Take 1 capsule (40 mg total) by mouth 2 (two) times a day before meals   • PARoxetine (PAXIL) 10 mg tablet TAKE 1 TABLET BY MOUTH EVERY DAY   • rizatriptan (MAXALT) 10 mg tablet TAKE 1 TABLET (10 MG TOTAL) BY MOUTH AS NEEDED FOR MIGRAINE TAKE AT THE ONSET OF MIGRAINE; IF SYMPTOMS CONTINUE OR RETURN, MAY TAKE ANOTHER DOSE AT LEAST 2 HOURS AFTER FIRST DOSE  TAKE NO MORE THAN 2 DOSES IN A DAY  • valsartan (DIOVAN) 320 MG tablet Take 1 tablet (320 mg total) by mouth daily   • [DISCONTINUED] brompheniramine-pseudoephedrine-DM 30-2-10 MG/5ML syrup Take 5 mL by mouth 4 (four) times a day as needed for congestion or cough (Patient not taking: Reported on 6/19/2023)       Objective     /82 (BP Location: Left arm, Patient Position: Sitting, Cuff Size: Standard)   Pulse 88   Temp (!) 97 3 °F (36 3 °C) (Tympanic)   Resp 16   Ht 5' 8\" (1 727 m)   Wt 90 7 kg (200 lb)   SpO2 99%   BMI 30 41 kg/m²     Physical Exam  Constitutional:       General: He is not in acute distress  Appearance: Normal appearance  He is well-developed  He is not ill-appearing  HENT:      Right Ear: Tympanic membrane and ear canal normal       Left Ear: Tympanic membrane and ear canal normal       Nose: Nose normal       Right Sinus: No maxillary sinus tenderness or frontal sinus tenderness  Left Sinus: No maxillary sinus tenderness or frontal sinus tenderness  Mouth/Throat:      Mouth: Mucous membranes are moist       Pharynx: No oropharyngeal exudate or posterior oropharyngeal erythema  Tonsils: No tonsillar exudate  Eyes:      General:         Right eye: No discharge  Left eye: No discharge  Conjunctiva/sclera: Conjunctivae normal       Pupils: Pupils are equal, round, and reactive to light     Cardiovascular:      Rate and " Rhythm: Normal rate  Heart sounds: Normal heart sounds  Pulmonary:      Effort: Pulmonary effort is normal       Breath sounds: Normal breath sounds  Musculoskeletal:      Cervical back: Normal range of motion  Lymphadenopathy:      Cervical: No cervical adenopathy  Neurological:      General: No focal deficit present  Mental Status: He is alert  Mental status is at baseline     Psychiatric:         Mood and Affect: Mood normal        Elizabeth Ramsey MD

## 2023-06-21 ENCOUNTER — TELEPHONE (OUTPATIENT)
Dept: FAMILY MEDICINE CLINIC | Facility: CLINIC | Age: 43
End: 2023-06-21

## 2023-06-21 NOTE — TELEPHONE ENCOUNTER
Rachelle King called says he was seen 6/19 and is not feeling better    Says he is taking the Z-cong and cough med    S/S: Cough, runny nose, chest pain, and felt dizzy while he was at work    Rachelle King # [phone]

## 2023-06-22 ENCOUNTER — OFFICE VISIT (OUTPATIENT)
Dept: FAMILY MEDICINE CLINIC | Facility: CLINIC | Age: 43
End: 2023-06-22
Payer: COMMERCIAL

## 2023-06-22 VITALS
WEIGHT: 200 LBS | RESPIRATION RATE: 16 BRPM | HEART RATE: 100 BPM | OXYGEN SATURATION: 98 % | TEMPERATURE: 97.9 F | HEIGHT: 68 IN | DIASTOLIC BLOOD PRESSURE: 80 MMHG | SYSTOLIC BLOOD PRESSURE: 124 MMHG | BODY MASS INDEX: 30.31 KG/M2

## 2023-06-22 DIAGNOSIS — F32.A CHRONIC DEPRESSION: ICD-10-CM

## 2023-06-22 DIAGNOSIS — J06.9 ACUTE URI: Primary | ICD-10-CM

## 2023-06-22 PROCEDURE — 99213 OFFICE O/P EST LOW 20 MIN: CPT | Performed by: FAMILY MEDICINE

## 2023-06-22 RX ORDER — PAROXETINE 10 MG/1
TABLET, FILM COATED ORAL
Qty: 90 TABLET | Refills: 0 | Status: SHIPPED | OUTPATIENT
Start: 2023-06-22

## 2023-06-22 RX ORDER — AMOXICILLIN 875 MG/1
875 TABLET, COATED ORAL 2 TIMES DAILY
Qty: 14 TABLET | Refills: 0 | Status: SHIPPED | OUTPATIENT
Start: 2023-06-22 | End: 2023-06-29

## 2023-06-22 NOTE — ASSESSMENT & PLAN NOTE
Patient has had it for 10 days and exam ok today  Patient on zpak but will change due to possible SE  Patient to continue rest and fluids  Continue benzonatate for cough as well  Call if no better

## 2023-06-22 NOTE — PROGRESS NOTES
Assessment/Plan:         Problem List Items Addressed This Visit        Respiratory    Acute URI - Primary     Patient has had it for 10 days and exam ok today  Patient on zpak but will change due to possible SE  Patient to continue rest and fluids  Continue benzonatate for cough as well  Call if no better  Relevant Medications    amoxicillin (AMOXIL) 875 mg tablet         Subjective:      Patient ID: Uyen Menjivar is a 40 y o  male  Patient here for 10 day history of URI  Was seen on 6/19 and started on Zpak  Not much better and has cough and sore throat  No fever  Was dizzy at work off and on  Taking benzonatate for cough and benadryl for sleep  Thinks he is having reaction to zpak  Feels like he has burning sensation on back and neck  No rash  The following portions of the patient's history were reviewed and updated as appropriate:   Past Medical History:  He has a past medical history of Allergic, Depression, Elevated liver enzymes, Fatty liver, Heartburn, Hypertension (04/2021), Psychiatric disorder, and Renal disorder  ,  _______________________________________________________________________  Medical Problems:  does not have any pertinent problems on file ,  _______________________________________________________________________  Past Surgical History:   has a past surgical history that includes Tooth extraction; pr lithotripsy xtrcorp shock wave (Left, 11/4/2016); pr colonoscopy flx dx w/collj spec when pfrmd (N/A, 6/11/2018); Colonoscopy; and Upper gastrointestinal endoscopy  ,  _______________________________________________________________________  Family History:  family history includes Cancer in his father; Colon cancer in his father; Crohn's disease in his father; Depression in his father and mother; Diabetes in his paternal grandmother; Liver disease in his brother; Migraines in his mother;  Tourette syndrome in his brother ,  _______________________________________________________________________  Social History:   reports that he has been smoking cigarettes  He started smoking about 22 years ago  He has a 5 00 pack-year smoking history  He has never used smokeless tobacco  He reports current alcohol use  He reports that he does not use drugs  ,  _______________________________________________________________________  Allergies:  has No Known Allergies     _______________________________________________________________________  Current Outpatient Medications   Medication Sig Dispense Refill   • amLODIPine (NORVASC) 5 mg tablet TAKE 1 TABLET (5 MG TOTAL) BY MOUTH DAILY  90 tablet 1   • amoxicillin (AMOXIL) 875 mg tablet Take 1 tablet (875 mg total) by mouth 2 (two) times a day for 7 days 14 tablet 0   • atorvastatin (LIPITOR) 10 mg tablet TAKE 1 TABLET BY MOUTH EVERY DAY 90 tablet 2   • azithromycin (ZITHROMAX) 250 mg tablet Take 2 tablets today then 1 tablet daily x 4 days 6 tablet 0   • benzonatate (TESSALON) 200 MG capsule Take 1 capsule (200 mg total) by mouth 3 (three) times a day as needed for cough 30 capsule 0   • Cyanocobalamin 1000 MCG SUBL Place 1 tablet (1,000 mcg total) under the tongue daily 90 tablet 0   • diclofenac potassium (CATAFLAM) 50 mg tablet TAKE 1 TABLET EVERY 8 HOURS FOR 7 DAYS AND THEN EVERY 8 HOURS AS NEED FOR PAIN  TAKE WITH FOOD   30 tablet 1   • dicyclomine (BENTYL) 20 mg tablet Take 1 tablet (20 mg total) by mouth 3 (three) times a day 270 tablet 1   • ketorolac (TORADOL) 10 mg tablet Take 1 tablet (10 mg total) by mouth every 6 (six) hours as needed for moderate pain 10 tablet 0   • LORazepam (ATIVAN) 1 mg tablet Take 1 tab 40 min prior to MRI with a second tab 10 min prior to imaging 2 tablet 0   • magnesium Oxide (MAG-OX) 400 mg TABS Take 1 tablet (400 mg total) by mouth daily 90 tablet 0   • omeprazole (PriLOSEC) 40 MG capsule Take 1 capsule (40 mg total) by mouth 2 (two) times a day before "meals 180 capsule 0   • PARoxetine (PAXIL) 10 mg tablet TAKE 1 TABLET BY MOUTH EVERY DAY 90 tablet 0   • rizatriptan (MAXALT) 10 mg tablet TAKE 1 TABLET (10 MG TOTAL) BY MOUTH AS NEEDED FOR MIGRAINE TAKE AT THE ONSET OF MIGRAINE; IF SYMPTOMS CONTINUE OR RETURN, MAY TAKE ANOTHER DOSE AT LEAST 2 HOURS AFTER FIRST DOSE  TAKE NO MORE THAN 2 DOSES IN A DAY  9 tablet 0   • valsartan (DIOVAN) 320 MG tablet Take 1 tablet (320 mg total) by mouth daily 90 tablet 0     No current facility-administered medications for this visit      _______________________________________________________________________  Review of Systems   Constitutional: Negative for chills, fatigue and fever  HENT: Positive for congestion and sore throat  Negative for ear pain, postnasal drip, rhinorrhea, sinus pressure, sinus pain and trouble swallowing  Respiratory: Positive for cough  Negative for chest tightness, shortness of breath and wheezing  Cardiovascular: Negative for chest pain  Gastrointestinal: Negative for abdominal pain, diarrhea, nausea and vomiting  Musculoskeletal: Negative for arthralgias  Skin: Negative for rash  Neurological: Negative for dizziness and headaches  Objective:  Vitals:    06/22/23 1359   BP: 124/80   BP Location: Left arm   Patient Position: Sitting   Cuff Size: Standard   Pulse: 100   Resp: 16   Temp: 97 9 °F (36 6 °C)   TempSrc: Tympanic   SpO2: 98%   Weight: 90 7 kg (200 lb)   Height: 5' 8\" (1 727 m)     Body mass index is 30 41 kg/m²  Physical Exam  Vitals and nursing note reviewed  Constitutional:       Appearance: He is well-developed  He is not ill-appearing or toxic-appearing  HENT:      Right Ear: Tympanic membrane and ear canal normal       Left Ear: Tympanic membrane and ear canal normal       Nose: Congestion present  No rhinorrhea  Mouth/Throat:      Mouth: Mucous membranes are moist  No oral lesions  Pharynx: Oropharynx is clear  Uvula midline   Posterior " oropharyngeal erythema present  No oropharyngeal exudate or uvula swelling  Tonsils: No tonsillar exudate  Cardiovascular:      Rate and Rhythm: Normal rate and regular rhythm  Heart sounds: Normal heart sounds  No murmur heard  Pulmonary:      Effort: Pulmonary effort is normal  No respiratory distress  Breath sounds: Normal breath sounds  No wheezing or rhonchi  Musculoskeletal:      Cervical back: Normal range of motion and neck supple  Lymphadenopathy:      Cervical: No cervical adenopathy

## 2023-07-14 DIAGNOSIS — I10 ESSENTIAL HYPERTENSION: ICD-10-CM

## 2023-07-14 RX ORDER — VALSARTAN 320 MG/1
320 TABLET ORAL DAILY
Qty: 90 TABLET | Refills: 1 | Status: SHIPPED | OUTPATIENT
Start: 2023-07-14

## 2023-07-27 ENCOUNTER — HOSPITAL ENCOUNTER (EMERGENCY)
Facility: HOSPITAL | Age: 43
Discharge: HOME | End: 2023-07-27
Attending: EMERGENCY MEDICINE
Payer: COMMERCIAL

## 2023-07-27 VITALS
SYSTOLIC BLOOD PRESSURE: 130 MMHG | HEART RATE: 74 BPM | TEMPERATURE: 98.2 F | DIASTOLIC BLOOD PRESSURE: 75 MMHG | OXYGEN SATURATION: 100 % | RESPIRATION RATE: 14 BRPM

## 2023-07-27 DIAGNOSIS — I10 HIGH BLOOD PRESSURE: ICD-10-CM

## 2023-07-27 DIAGNOSIS — R42 DIZZINESS: Primary | ICD-10-CM

## 2023-07-27 LAB
ALBUMIN SERPL BCP-MCNC: 4.5 G/DL (ref 3.5–5)
ALP SERPL-CCNC: 56 U/L (ref 34–104)
ALT SERPL W P-5'-P-CCNC: 77 U/L (ref 7–52)
ANION GAP SERPL CALCULATED.3IONS-SCNC: 7 MMOL/L
AST SERPL W P-5'-P-CCNC: 44 U/L (ref 13–39)
BASOPHILS # BLD AUTO: 0.04 THOUSANDS/ÂΜL (ref 0–0.1)
BASOPHILS NFR BLD AUTO: 1 % (ref 0–1)
BILIRUB SERPL-MCNC: 0.55 MG/DL (ref 0.2–1)
BUN SERPL-MCNC: 16 MG/DL (ref 5–25)
CALCIUM SERPL-MCNC: 9.5 MG/DL (ref 8.4–10.2)
CHLORIDE SERPL-SCNC: 103 MMOL/L (ref 96–108)
CO2 SERPL-SCNC: 26 MMOL/L (ref 21–32)
CREAT SERPL-MCNC: 0.91 MG/DL (ref 0.6–1.3)
EOSINOPHIL # BLD AUTO: 0.12 THOUSAND/ÂΜL (ref 0–0.61)
EOSINOPHIL NFR BLD AUTO: 2 % (ref 0–6)
ERYTHROCYTE [DISTWIDTH] IN BLOOD BY AUTOMATED COUNT: 12.3 % (ref 11.6–15.1)
GFR SERPL CREATININE-BSD FRML MDRD: 104 ML/MIN/1.73SQ M
GLUCOSE SERPL-MCNC: 122 MG/DL (ref 65–140)
HCT VFR BLD AUTO: 44.6 % (ref 36.5–49.3)
HGB BLD-MCNC: 15 G/DL (ref 12–17)
IMM GRANULOCYTES # BLD AUTO: 0.01 THOUSAND/UL (ref 0–0.2)
IMM GRANULOCYTES NFR BLD AUTO: 0 % (ref 0–2)
LIPASE SERPL-CCNC: 30 U/L (ref 11–82)
LYMPHOCYTES # BLD AUTO: 2.24 THOUSANDS/ÂΜL (ref 0.6–4.47)
LYMPHOCYTES NFR BLD AUTO: 41 % (ref 14–44)
MCH RBC QN AUTO: 29.1 PG (ref 26.8–34.3)
MCHC RBC AUTO-ENTMCNC: 33.6 G/DL (ref 31.4–37.4)
MCV RBC AUTO: 87 FL (ref 82–98)
MONOCYTES # BLD AUTO: 0.42 THOUSAND/ÂΜL (ref 0.17–1.22)
MONOCYTES NFR BLD AUTO: 8 % (ref 4–12)
NEUTROPHILS # BLD AUTO: 2.66 THOUSANDS/ÂΜL (ref 1.85–7.62)
NEUTS SEG NFR BLD AUTO: 48 % (ref 43–75)
NRBC BLD AUTO-RTO: 0 /100 WBCS
PLATELET # BLD AUTO: 305 THOUSANDS/UL (ref 149–390)
PMV BLD AUTO: 9.8 FL (ref 8.9–12.7)
POTASSIUM SERPL-SCNC: 3.5 MMOL/L (ref 3.5–5.3)
PROT SERPL-MCNC: 7 G/DL (ref 6.4–8.4)
RBC # BLD AUTO: 5.15 MILLION/UL (ref 3.88–5.62)
SODIUM SERPL-SCNC: 136 MMOL/L (ref 135–147)
WBC # BLD AUTO: 5.49 THOUSAND/UL (ref 4.31–10.16)

## 2023-07-27 PROCEDURE — 85025 COMPLETE CBC W/AUTO DIFF WBC: CPT

## 2023-07-27 PROCEDURE — 99285 EMERGENCY DEPT VISIT HI MDM: CPT | Performed by: EMERGENCY MEDICINE

## 2023-07-27 PROCEDURE — 93005 ELECTROCARDIOGRAM TRACING: CPT

## 2023-07-27 PROCEDURE — 99284 EMERGENCY DEPT VISIT MOD MDM: CPT

## 2023-07-27 PROCEDURE — 96360 HYDRATION IV INFUSION INIT: CPT

## 2023-07-27 PROCEDURE — 80053 COMPREHEN METABOLIC PANEL: CPT

## 2023-07-27 PROCEDURE — 36415 COLL VENOUS BLD VENIPUNCTURE: CPT

## 2023-07-27 PROCEDURE — 83690 ASSAY OF LIPASE: CPT

## 2023-07-27 RX ORDER — MECLIZINE HCL 12.5 MG/1
12.5 TABLET ORAL 3 TIMES DAILY PRN
Qty: 30 TABLET | Refills: 0 | Status: SHIPPED | OUTPATIENT
Start: 2023-07-27

## 2023-07-27 RX ORDER — MECLIZINE HCL 12.5 MG/1
25 TABLET ORAL ONCE
Status: COMPLETED | OUTPATIENT
Start: 2023-07-27 | End: 2023-07-27

## 2023-07-27 RX ADMIN — MECLIZINE 25 MG: 12.5 TABLET ORAL at 06:23

## 2023-07-27 RX ADMIN — SODIUM CHLORIDE 1000 ML: 0.9 INJECTION, SOLUTION INTRAVENOUS at 06:25

## 2023-07-27 NOTE — DISCHARGE INSTRUCTIONS
DISCHARGE INSTRUCTIONS:   Return to the emergency department if:   You have a headache and a stiff neck. You have shaking chills and a fever. You vomit over and over with no relief. You have blood, pus, or fluid coming out of your ears. You are confused.

## 2023-07-27 NOTE — ED PROVIDER NOTES
History  Chief Complaint   Patient presents with   • Dizziness     Pt presents to the ED with c/o dizziness that started approx 1700 yesterday. Pt reports he also had diarrhea. HPI    Prior to Admission Medications   Prescriptions Last Dose Informant Patient Reported? Taking? Cyanocobalamin 1000 MCG SUBL   No No   Sig: Place 1 tablet (1,000 mcg total) under the tongue daily   LORazepam (ATIVAN) 1 mg tablet   No No   Sig: Take 1 tab 40 min prior to MRI with a second tab 10 min prior to imaging   PARoxetine (PAXIL) 10 mg tablet   No No   Sig: TAKE 1 TABLET BY MOUTH EVERY DAY   amLODIPine (NORVASC) 5 mg tablet   No No   Sig: TAKE 1 TABLET (5 MG TOTAL) BY MOUTH DAILY. atorvastatin (LIPITOR) 10 mg tablet   No No   Sig: TAKE 1 TABLET BY MOUTH EVERY DAY   benzonatate (TESSALON) 200 MG capsule   No No   Sig: Take 1 capsule (200 mg total) by mouth 3 (three) times a day as needed for cough   diclofenac potassium (CATAFLAM) 50 mg tablet   No No   Sig: TAKE 1 TABLET EVERY 8 HOURS FOR 7 DAYS AND THEN EVERY 8 HOURS AS NEED FOR PAIN. TAKE WITH FOOD. dicyclomine (BENTYL) 20 mg tablet   No No   Sig: Take 1 tablet (20 mg total) by mouth 3 (three) times a day   ketorolac (TORADOL) 10 mg tablet   No No   Sig: Take 1 tablet (10 mg total) by mouth every 6 (six) hours as needed for moderate pain   magnesium Oxide (MAG-OX) 400 mg TABS   No No   Sig: Take 1 tablet (400 mg total) by mouth daily   omeprazole (PriLOSEC) 40 MG capsule   No No   Sig: Take 1 capsule (40 mg total) by mouth 2 (two) times a day before meals   rizatriptan (MAXALT) 10 mg tablet   No No   Sig: TAKE 1 TABLET (10 MG TOTAL) BY MOUTH AS NEEDED FOR MIGRAINE TAKE AT THE ONSET OF MIGRAINE; IF SYMPTOMS CONTINUE OR RETURN, MAY TAKE ANOTHER DOSE AT LEAST 2 HOURS AFTER FIRST DOSE. TAKE NO MORE THAN 2 DOSES IN A DAY.    valsartan (DIOVAN) 320 MG tablet   No No   Sig: Take 1 tablet (320 mg total) by mouth daily      Facility-Administered Medications: None       Past Medical History:   Diagnosis Date   • Allergic    • Depression    • Elevated liver enzymes    • Fatty liver    • Heartburn    • Hypertension 04/2021   • Psychiatric disorder     depression   • Renal disorder     stones       Past Surgical History:   Procedure Laterality Date   • COLONOSCOPY     • MT COLONOSCOPY FLX DX W/COLLJ SPEC WHEN PFRMD N/A 6/11/2018    Procedure: EGD AND COLONOSCOPY;  Surgeon: Chidi Ware MD;  Location: [facility]; Service: Gastroenterology   • MT LITHOTRIPSY XTRCORP SHOCK WAVE Left 11/4/2016    Procedure: Tu Staggers SHOCKWAVE (ESWL); Surgeon: Musa Liz MD;  Location: [facility];  Service: Urology   • TOOTH EXTRACTION     • UPPER GASTROINTESTINAL ENDOSCOPY         Family History   Problem Relation Age of Onset   • Migraines Mother    • Depression Mother         Duane   • Crohn's disease Father    • Depression Father    • Colon cancer Father    • Cancer Father    • Liver disease Brother    • Tourette syndrome Brother    • Diabetes Paternal Grandmother      I have reviewed and agree with the history as documented. E-Cigarette/Vaping   • E-Cigarette Use Never User      E-Cigarette/Vaping Substances   • Nicotine No    • THC No    • CBD No    • Flavoring No    • Other No    • Unknown No      Social History     Tobacco Use   • Smoking status: Light Smoker     Packs/day: 0.25     Years: 20.00     Total pack years: 5.00     Types: Cigarettes     Start date: 1/1/2001   • Smokeless tobacco: Never   Vaping Use   • Vaping Use: Never used   Substance Use Topics   • Alcohol use: Yes     Comment: socially   • Drug use: No        Review of Systems   Constitutional: Negative for chills and fever. HENT: Negative for ear pain and sore throat. Eyes: Negative for pain and visual disturbance. Respiratory: Negative for cough and shortness of breath. Cardiovascular: Negative for chest pain and leg swelling. Gastrointestinal: Positive for abdominal pain and diarrhea. Negative for blood in stool, constipation, nausea and vomiting. Genitourinary: Negative for dysuria and hematuria. Musculoskeletal: Negative for neck pain and neck stiffness. Neurological: Positive for dizziness. Negative for syncope, weakness and light-headedness. All other systems reviewed and are negative. Physical Exam  ED Triage Vitals   Temperature Pulse Respirations Blood Pressure SpO2   07/27/23 0350 07/27/23 0350 07/27/23 0350 07/27/23 0350 07/27/23 0350   98.2 °F (36.8 °C) 102 14 169/78 99 %      Temp Source Heart Rate Source Patient Position - Orthostatic VS BP Location FiO2 (%)   07/27/23 0350 07/27/23 0350 07/27/23 0716 07/27/23 0350 --   Oral Monitor Sitting Right arm       Pain Score       07/27/23 0350       6             Orthostatic Vital Signs  Vitals:    07/27/23 0350 07/27/23 0716   BP: 169/78 130/75   Pulse: 102 74   Patient Position - Orthostatic VS:  Sitting       Physical Exam  Vitals and nursing note reviewed. Constitutional:       General: He is not in acute distress. Appearance: Normal appearance. He is well-developed. He is not ill-appearing, toxic-appearing or diaphoretic. HENT:      Head: Normocephalic and atraumatic. Right Ear: External ear normal. There is impacted cerumen. Left Ear: External ear normal. There is impacted cerumen. Nose: Nose normal. No congestion or rhinorrhea. Mouth/Throat:      Mouth: Mucous membranes are moist.      Pharynx: Oropharynx is clear. No oropharyngeal exudate or posterior oropharyngeal erythema. Eyes:      Extraocular Movements: Extraocular movements intact. Conjunctiva/sclera: Conjunctivae normal.      Pupils: Pupils are equal, round, and reactive to light. Cardiovascular:      Rate and Rhythm: Normal rate and regular rhythm. Pulses: Normal pulses. Heart sounds: Normal heart sounds. No murmur heard. Pulmonary:      Effort: Pulmonary effort is normal. No respiratory distress.       Breath sounds: Normal breath sounds. No wheezing, rhonchi or rales. Abdominal:      General: There is no distension. Palpations: Abdomen is soft. Tenderness: There is no abdominal tenderness. There is no guarding or rebound. Musculoskeletal:         General: No swelling. Cervical back: Normal range of motion and neck supple. No rigidity or tenderness. Right lower leg: No edema. Left lower leg: No edema. Lymphadenopathy:      Cervical: No cervical adenopathy. Skin:     General: Skin is warm and dry. Capillary Refill: Capillary refill takes less than 2 seconds. Coloration: Skin is not jaundiced or pale. Findings: No bruising or rash. Neurological:      General: No focal deficit present. Mental Status: He is alert and oriented to person, place, and time. Cranial Nerves: No cranial nerve deficit. Sensory: No sensory deficit. Motor: No weakness.       Coordination: Coordination normal.      Gait: Gait normal.   Psychiatric:         Mood and Affect: Mood normal.         Behavior: Behavior normal.         ED Medications  Medications   sodium chloride 0.9 % bolus 1,000 mL (0 mL Intravenous Stopped 7/27/23 0730)   meclizine (ANTIVERT) tablet 25 mg (25 mg Oral Given 7/27/23 0623)       Diagnostic Studies  Results Reviewed     Procedure Component Value Units Date/Time    Comprehensive metabolic panel [[id number]]  (Abnormal) Collected: 07/27/23 0445    Lab Status: Final result Specimen: Blood from Arm, Right Updated: 07/27/23 0517     Sodium 136 mmol/L      Potassium 3.5 mmol/L      Chloride 103 mmol/L      CO2 26 mmol/L      ANION GAP 7 mmol/L      BUN 16 mg/dL      Creatinine 0.91 mg/dL      Glucose 122 mg/dL      Calcium 9.5 mg/dL      AST 44 U/L      ALT 77 U/L      Alkaline Phosphatase 56 U/L      Total Protein 7.0 g/dL      Albumin 4.5 g/dL      Total Bilirubin 0.55 mg/dL      eGFR 104 ml/min/1.73sq m     Narrative:      [facility] guidelines for Chronic Kidney Disease (CKD):   •  Stage 1 with normal or high GFR (GFR > 90 mL/min/1.73 square meters)  •  Stage 2 Mild CKD (GFR = 60-89 mL/min/1.73 square meters)  •  Stage 3A Moderate CKD (GFR = 45-59 mL/min/1.73 square meters)  •  Stage 3B Moderate CKD (GFR = 30-44 mL/min/1.73 square meters)  •  Stage 4 Severe CKD (GFR = 15-29 mL/min/1.73 square meters)  •  Stage 5 End Stage CKD (GFR <15 mL/min/1.73 square meters)  Note: GFR calculation is accurate only with a steady state creatinine    Lipase [[id number]]  (Normal) Collected: 07/27/23 0445    Lab Status: Final result Specimen: Blood from Arm, Right Updated: 07/27/23 0517     Lipase 30 u/L     CBC and differential [[id number]] Collected: 07/27/23 0445    Lab Status: Final result Specimen: Blood from Arm, Right Updated: 07/27/23 0457     WBC 5.49 Thousand/uL      RBC 5.15 Million/uL      Hemoglobin 15.0 g/dL      Hematocrit 44.6 %      MCV 87 fL      MCH 29.1 pg      MCHC 33.6 g/dL      RDW 12.3 %      MPV 9.8 fL      Platelets 684 Thousands/uL      nRBC 0 /100 WBCs      Neutrophils Relative 48 %      Immat GRANS % 0 %      Lymphocytes Relative 41 %      Monocytes Relative 8 %      Eosinophils Relative 2 %      Basophils Relative 1 %      Neutrophils Absolute 2.66 Thousands/µL      Immature Grans Absolute 0.01 Thousand/uL      Lymphocytes Absolute 2.24 Thousands/µL      Monocytes Absolute 0.42 Thousand/µL      Eosinophils Absolute 0.12 Thousand/µL      Basophils Absolute 0.04 Thousands/µL                  No orders to display         Procedures  ECG 12 Lead Documentation Only    Date/Time: 7/27/2023 4:00 AM    Performed by: Ren Rowley MD  Authorized by:  Ren Rowley MD    Indications / Diagnosis:  Dizziness  ECG reviewed by me, the ED Provider: yes    Patient location:  ED  Previous ECG:     Previous ECG:  Compared to current  Interpretation:     Interpretation: non-specific    Rate:     ECG rate:  95    ECG rate assessment: normal Rhythm:     Rhythm: sinus rhythm    Ectopy:     Ectopy: none    QRS:     QRS axis:  Normal    QRS intervals:  Normal  Conduction:     Conduction: normal    ST segments:     ST segments:  Non-specific  T waves:     T waves: normal    Comments:      qtc [id number]          ED Course  ED Course as of 07/27/23 0809   Thu Jul 27, 2023   0600 Cate Rowley negative    0602 CBC unremarkable   0604 AST(!): 44   0604 ALT(!): 77  Mild elevations in LFTs, consistent with prior labs, continues to downtrend   0605 Will give 1L IVF and meclizine    0655 Patient feeling better after fluids and meclizine                                        MDM      Disposition  Final diagnoses:   Dizziness   High blood pressure     Time reflects when diagnosis was documented in both MDM as applicable and the Disposition within this note     Time User Action Codes Description Comment    7/27/2023  7:15 AM Estela Mcneil Add [R42] Dizziness     7/27/2023  7:16 AM Marshall Matias [facility] High blood pressure       ED Disposition     ED Disposition   Discharge    Condition   Stable    Date/Time   u Jul 27, 2023  7:16 AM    Comment   Morse Bel Duane discharge to home/self care. Follow-up Information     Follow up With Specialties Details Why [address] [facility] Schedule an appointment as soon as possible for a visit  to discuss high blood pressure [address]  [address] [facility] [address]  [phone]            Discharge Medication List as of 7/27/2023  7:27 AM      START taking these medications    Details   meclizine (ANTIVERT) 12.5 MG tablet Take 1 tablet (12.5 mg total) by mouth 3 (three) times a day as needed for dizziness, Starting Thu 7/27/2023, Normal         CONTINUE these medications which have NOT CHANGED    Details   amLODIPine (NORVASC) 5 mg tablet TAKE 1 TABLET (5 MG TOTAL) BY MOUTH DAILY. , Starting Mon 6/12/2023, Normal      atorvastatin (LIPITOR) 10 mg tablet TAKE 1 TABLET BY MOUTH EVERY DAY, Normal      benzonatate (TESSALON) 200 MG capsule Take 1 capsule (200 mg total) by mouth 3 (three) times a day as needed for cough, Starting Mon 6/19/2023, Normal      Cyanocobalamin 1000 MCG SUBL Place 1 tablet (1,000 mcg total) under the tongue daily, Starting Sat 4/15/2023, Normal      diclofenac potassium (CATAFLAM) 50 mg tablet TAKE 1 TABLET EVERY 8 HOURS FOR 7 DAYS AND THEN EVERY 8 HOURS AS NEED FOR PAIN. TAKE WITH FOOD., Normal      dicyclomine (BENTYL) 20 mg tablet Take 1 tablet (20 mg total) by mouth 3 (three) times a day, Starting Mon 3/27/2023, Normal      ketorolac (TORADOL) 10 mg tablet Take 1 tablet (10 mg total) by mouth every 6 (six) hours as needed for moderate pain, Starting Sat 4/15/2023, Normal      LORazepam (ATIVAN) 1 mg tablet Take 1 tab 40 min prior to MRI with a second tab 10 min prior to imaging, Phone In      magnesium Oxide (MAG-OX) 400 mg TABS Take 1 tablet (400 mg total) by mouth daily, Starting Sat 4/15/2023, Normal      omeprazole (PriLOSEC) 40 MG capsule Take 1 capsule (40 mg total) by mouth 2 (two) times a day before meals, Starting Fri 6/16/2023, Normal      PARoxetine (PAXIL) 10 mg tablet TAKE 1 TABLET BY MOUTH EVERY DAY, Normal      rizatriptan (MAXALT) 10 mg tablet TAKE 1 TABLET (10 MG TOTAL) BY MOUTH AS NEEDED FOR MIGRAINE TAKE AT THE ONSET OF MIGRAINE; IF SYMPTOMS CONTINUE OR RETURN, MAY TAKE ANOTHER DOSE AT LEAST 2 HOURS AFTER FIRST DOSE. TAKE NO MORE THAN 2 DOSES IN A DAY., Starting Tue 3/14/2023, Normal      valsartan (DIOVAN) 320 MG tablet Take 1 tablet (320 mg total) by mouth daily, Starting Fri 7/14/2023, Normal               PDMP Review       Value Time User    PDMP Reviewed  Yes 2/2/2023  2:22 PM Haven Bess MD           ED Provider  Attending physically available and evaluated Jeanna Hussein. I managed the patient along with the ED Attending.     Electronically Signed by 12.5 MG tablet Take 1 tablet (12.5 mg total) by mouth 3 (three) times a day as needed for dizziness, Starting Thu 7/27/2023, Normal         CONTINUE these medications which have NOT CHANGED    Details   amLODIPine (NORVASC) 5 mg tablet TAKE 1 TABLET (5 MG TOTAL) BY MOUTH DAILY. , Starting Mon 6/12/2023, Normal      atorvastatin (LIPITOR) 10 mg tablet TAKE 1 TABLET BY MOUTH EVERY DAY, Normal      benzonatate (TESSALON) 200 MG capsule Take 1 capsule (200 mg total) by mouth 3 (three) times a day as needed for cough, Starting Mon 6/19/2023, Normal      Cyanocobalamin 1000 MCG SUBL Place 1 tablet (1,000 mcg total) under the tongue daily, Starting Sat 4/15/2023, Normal      diclofenac potassium (CATAFLAM) 50 mg tablet TAKE 1 TABLET EVERY 8 HOURS FOR 7 DAYS AND THEN EVERY 8 HOURS AS NEED FOR PAIN.  TAKE WITH FOOD., Normal      dicyclomine (BENTYL) 20 mg tablet Take 1 tablet (20 mg total) by mouth 3 (three) times a day, Starting Mon 3/27/2023, Normal      ketorolac (TORADOL) 10 mg tablet Take 1 tablet (10 mg total) by mouth every 6 (six) hours as needed for moderate pain, Starting Sat 4/15/2023, Normal      LORazepam (ATIVAN) 1 mg tablet Take 1 tab 40 min prior to MRI with a second tab 10 min prior to imaging, Phone In      magnesium Oxide (MAG-OX) 400 mg TABS Take 1 tablet (400 mg total) by mouth daily, Starting Sat 4/15/2023, Normal      omeprazole (PriLOSEC) 40 MG capsule Take 1 capsule (40 mg total) by mouth 2 (two) times a day before meals, Starting Fri 6/16/2023, Normal      PARoxetine (PAXIL) 10 mg tablet TAKE 1 TABLET BY MOUTH EVERY DAY, Normal      rizatriptan (MAXALT) 10 mg tablet TAKE 1 TABLET (10 MG TOTAL) BY MOUTH AS NEEDED FOR MIGRAINE TAKE AT THE ONSET OF MIGRAINE; IF SYMPTOMS CONTINUE OR RETURN, MAY TAKE ANOTHER DOSE AT LEAST 2 HOURS AFTER FIRST DOSE. TAKE NO MORE THAN 2 DOSES IN A DAY., Starting Tue 3/14/2023, Normal      valsartan (DIOVAN) 320 MG tablet Take 1 tablet (320 mg total) by mouth daily, Starting Fri 7/14/2023, Normal               PDMP Review       Value Time User    PDMP Reviewed  Yes 2/2/2023  2:22 PM Abhishek Muhammad MD           ED Provider  Attending physically available and evaluated Swati Wan. I managed the patient along with the ED Attending.     Electronically Signed by         Roly Cardona MD  07/31/23 5812

## 2023-07-27 NOTE — Clinical Note
Yu Hernandez was seen and treated in our emergency department on 7/27/2023. Diagnosis:     Dina Mike  may return to work on return date, is off the rest of the shift today. He may return on this date: 07/28/2023         If you have any questions or concerns, please don't hesitate to call.       Page Harden MD    ______________________________           _______________          _______________  [facility] Representative                              Date                                Time

## 2023-07-28 LAB
ATRIAL RATE: 95 BPM
P AXIS: 69 DEGREES
PR INTERVAL: 160 MS
QRS AXIS: 50 DEGREES
QRSD INTERVAL: 96 MS
QT INTERVAL: 336 MS
QTC INTERVAL: 422 MS
T WAVE AXIS: 45 DEGREES
VENTRICULAR RATE: 95 BPM

## 2023-07-28 PROCEDURE — 93010 ELECTROCARDIOGRAM REPORT: CPT | Performed by: INTERNAL MEDICINE

## 2023-08-02 PROBLEM — U07.1 COVID-19: Status: RESOLVED | Noted: 2022-06-14 | Resolved: 2023-08-02

## 2023-08-02 PROBLEM — R10.10 PAIN LOCALIZED TO UPPER ABDOMEN: Status: RESOLVED | Noted: 2022-04-28 | Resolved: 2023-08-02

## 2023-08-02 PROBLEM — J06.9 ACUTE URI: Status: RESOLVED | Noted: 2021-03-02 | Resolved: 2023-08-02

## 2023-08-03 ENCOUNTER — OFFICE VISIT (OUTPATIENT)
Dept: FAMILY MEDICINE CLINIC | Facility: CLINIC | Age: 43
End: 2023-08-03
Payer: COMMERCIAL

## 2023-08-03 VITALS
RESPIRATION RATE: 16 BRPM | DIASTOLIC BLOOD PRESSURE: 82 MMHG | BODY MASS INDEX: 29.86 KG/M2 | WEIGHT: 197 LBS | SYSTOLIC BLOOD PRESSURE: 110 MMHG | HEIGHT: 68 IN | TEMPERATURE: 98.3 F | OXYGEN SATURATION: 99 % | HEART RATE: 74 BPM

## 2023-08-03 DIAGNOSIS — R42 VERTIGO: Primary | ICD-10-CM

## 2023-08-03 DIAGNOSIS — I10 ESSENTIAL HYPERTENSION: ICD-10-CM

## 2023-08-03 DIAGNOSIS — E78.00 HYPERCHOLESTEROLEMIA: ICD-10-CM

## 2023-08-03 DIAGNOSIS — R79.89 ABNORMAL LFTS: ICD-10-CM

## 2023-08-03 PROCEDURE — 99214 OFFICE O/P EST MOD 30 MIN: CPT | Performed by: FAMILY MEDICINE

## 2023-08-03 NOTE — ASSESSMENT & PLAN NOTE
Patient has had chronic elevation of liver enzymes. Has seen Gastroenterology Dr Candy Blunt and has fatty liver. Patient advised to follow-up with him for management.

## 2023-08-03 NOTE — ASSESSMENT & PLAN NOTE
Recheck lipids. Continue atorvastatin 10 mg daily at bedtime. Advised pt to follow a low cholesterol diet and to exercise on a regular basis.

## 2023-08-03 NOTE — PROGRESS NOTES
Assessment/Plan:         Problem List Items Addressed This Visit        Cardiovascular and Mediastinum    Essential hypertension     Blood pressure ok today. Continue valsartan 320 mg qd and amlodipine 5 mg qd. Pt advised to continue low Na diet and to exercise on a regular basis. Other    Vertigo - Primary     Was seen in ER on 7/27 and given meclizine. Doing better now. If gets worse, will send for vestibular physical therapy. Hypercholesterolemia     Recheck lipids. Continue atorvastatin 10 mg daily at bedtime. Advised pt to follow a low cholesterol diet and to exercise on a regular basis. Relevant Orders    Lipid panel    Abnormal LFTs     Patient has had chronic elevation of liver enzymes. Has seen Gastroenterology Dr Jeff Melo and has fatty liver. Patient advised to follow-up with him for management. Subjective:      Patient ID: Orlando Kevin is a 40 y.o. male. Patient here for follow-up ER visit on 7/27 for dizziness. Started on 7/26 when in bed. Felt off balance when got up. Went to ER and had ECG and labs and were ok. Was diagnosed with vertigo and given meclizine. Doing better now. No other complaints. The following portions of the patient's history were reviewed and updated as appropriate:   Past Medical History:  He has a past medical history of Allergic, Depression, Elevated liver enzymes, Fatty liver, Heartburn, Hypertension (04/2021), Psychiatric disorder, and Renal disorder. ,  _______________________________________________________________________  Medical Problems:  does not have any pertinent problems on file.,  _______________________________________________________________________  Past Surgical History:   has a past surgical history that includes Tooth extraction; pr lithotripsy xtrcorp shock wave (Left, 11/4/2016); pr colonoscopy flx dx w/collj spec when pfrmd (N/A, 6/11/2018);  Colonoscopy; and Upper gastrointestinal endoscopy. ,  _______________________________________________________________________  Family History:  family history includes Cancer in his father; Colon cancer in his father; Crohn's disease in his father; Depression in his father and mother; Diabetes in his paternal grandmother; Liver disease in his brother; Migraines in his mother; Tourette syndrome in his brother.,  _______________________________________________________________________  Social History:   reports that he has been smoking cigarettes. He started smoking about 22 years ago. He has a 5.00 pack-year smoking history. He has never used smokeless tobacco. He reports current alcohol use. He reports that he does not use drugs. ,  _______________________________________________________________________  Allergies:  has No Known Allergies. .  _______________________________________________________________________  Current Outpatient Medications   Medication Sig Dispense Refill   • amLODIPine (NORVASC) 5 mg tablet TAKE 1 TABLET (5 MG TOTAL) BY MOUTH DAILY.  90 tablet 1   • atorvastatin (LIPITOR) 10 mg tablet TAKE 1 TABLET BY MOUTH EVERY DAY 90 tablet 2   • Cyanocobalamin 1000 MCG SUBL Place 1 tablet (1,000 mcg total) under the tongue daily 90 tablet 0   • dicyclomine (BENTYL) 20 mg tablet Take 1 tablet (20 mg total) by mouth 3 (three) times a day 270 tablet 1   • ketorolac (TORADOL) 10 mg tablet Take 1 tablet (10 mg total) by mouth every 6 (six) hours as needed for moderate pain 10 tablet 0   • magnesium Oxide (MAG-OX) 400 mg TABS Take 1 tablet (400 mg total) by mouth daily 90 tablet 0   • meclizine (ANTIVERT) 12.5 MG tablet Take 1 tablet (12.5 mg total) by mouth 3 (three) times a day as needed for dizziness 30 tablet 0   • omeprazole (PriLOSEC) 40 MG capsule Take 1 capsule (40 mg total) by mouth 2 (two) times a day before meals 180 capsule 0   • PARoxetine (PAXIL) 10 mg tablet TAKE 1 TABLET BY MOUTH EVERY DAY 90 tablet 0   • rizatriptan (MAXALT) 10 mg tablet TAKE 1 TABLET (10 MG TOTAL) BY MOUTH AS NEEDED FOR MIGRAINE TAKE AT THE ONSET OF MIGRAINE; IF SYMPTOMS CONTINUE OR RETURN, MAY TAKE ANOTHER DOSE AT LEAST 2 HOURS AFTER FIRST DOSE. TAKE NO MORE THAN 2 DOSES IN A DAY. 9 tablet 0   • valsartan (DIOVAN) 320 MG tablet Take 1 tablet (320 mg total) by mouth daily 90 tablet 1     No current facility-administered medications for this visit.     _______________________________________________________________________  Review of Systems   Constitutional: Negative for fatigue and unexpected weight change. Respiratory: Negative for cough and shortness of breath. Cardiovascular: Negative for chest pain. Gastrointestinal: Negative for abdominal pain, constipation, diarrhea and vomiting. Musculoskeletal: Negative for arthralgias. Neurological: Positive for dizziness. Negative for headaches. Psychiatric/Behavioral: Negative for dysphoric mood. The patient is not nervous/anxious. Objective:  Vitals:    08/03/23 0844   BP: 110/82   BP Location: Left arm   Patient Position: Sitting   Cuff Size: Standard   Pulse: 74   Resp: 16   Temp: 98.3 °F (36.8 °C)   TempSrc: Tympanic   SpO2: 99%   Weight: 89.4 kg (197 lb)   Height: 5' 8" (1.727 m)     Body mass index is 29.95 kg/m². Physical Exam  Vitals and nursing note reviewed. Constitutional:       Appearance: Normal appearance. He is well-developed and normal weight. Neck:      Thyroid: No thyromegaly. Cardiovascular:      Rate and Rhythm: Normal rate and regular rhythm. Heart sounds: Normal heart sounds. No murmur heard. Pulmonary:      Effort: Pulmonary effort is normal. No respiratory distress. Breath sounds: Normal breath sounds. No wheezing. Musculoskeletal:      Cervical back: Normal range of motion and neck supple. Right lower leg: No edema. Left lower leg: No edema. Lymphadenopathy:      Cervical: No cervical adenopathy.    Neurological:      Mental Status: He is alert and oriented to person, place, and time. Cranial Nerves: No cranial nerve deficit. Psychiatric:         Mood and Affect: Mood normal.         Behavior: Behavior normal.         Thought Content:  Thought content normal.         Judgment: Judgment normal.

## 2023-08-03 NOTE — ASSESSMENT & PLAN NOTE
Was seen in ER on 7/27 and given meclizine. Doing better now. If gets worse, will send for vestibular physical therapy.

## 2023-08-03 NOTE — ASSESSMENT & PLAN NOTE
Blood pressure ok today. Continue valsartan 320 mg qd and amlodipine 5 mg qd. Pt advised to continue low Na diet and to exercise on a regular basis.

## 2023-09-13 DIAGNOSIS — K21.9 GASTROESOPHAGEAL REFLUX DISEASE WITHOUT ESOPHAGITIS: ICD-10-CM

## 2023-09-13 RX ORDER — OMEPRAZOLE 40 MG/1
CAPSULE, DELAYED RELEASE ORAL
Qty: 180 CAPSULE | Refills: 0 | Status: SHIPPED | OUTPATIENT
Start: 2023-09-13

## 2023-09-16 DIAGNOSIS — K58.0 IRRITABLE BOWEL SYNDROME WITH DIARRHEA: ICD-10-CM

## 2023-09-17 DIAGNOSIS — F32.A CHRONIC DEPRESSION: ICD-10-CM

## 2023-09-18 RX ORDER — PAROXETINE 10 MG/1
TABLET, FILM COATED ORAL
Qty: 90 TABLET | Refills: 0 | Status: SHIPPED | OUTPATIENT
Start: 2023-09-18

## 2023-09-18 RX ORDER — DICYCLOMINE HCL 20 MG
20 TABLET ORAL 3 TIMES DAILY
Qty: 270 TABLET | Refills: 1 | Status: SHIPPED | OUTPATIENT
Start: 2023-09-18

## 2023-09-20 ENCOUNTER — TELEPHONE (OUTPATIENT)
Dept: OTHER | Facility: OTHER | Age: 43
End: 2023-09-20

## 2023-09-20 NOTE — TELEPHONE ENCOUNTER
PT. Called in to notify he is canceling his appointment 9/20 @ 10:30. Please call him back to reschedule.

## 2023-09-22 NOTE — TELEPHONE ENCOUNTER
Hello Good Day,     I have called you back to help re-schedule your cancelled appointment but no answer. I have left you a voicemail to please call us back at [phone] option #1 so we can help re-schedule.     Thank you,     Luis E Garcia

## 2023-09-28 ENCOUNTER — OFFICE VISIT (OUTPATIENT)
Dept: FAMILY MEDICINE CLINIC | Facility: CLINIC | Age: 43
End: 2023-09-28
Payer: COMMERCIAL

## 2023-09-28 VITALS
WEIGHT: 203 LBS | RESPIRATION RATE: 18 BRPM | OXYGEN SATURATION: 98 % | HEART RATE: 92 BPM | HEIGHT: 68 IN | SYSTOLIC BLOOD PRESSURE: 136 MMHG | TEMPERATURE: 98.2 F | BODY MASS INDEX: 30.77 KG/M2 | DIASTOLIC BLOOD PRESSURE: 94 MMHG

## 2023-09-28 DIAGNOSIS — I10 ESSENTIAL HYPERTENSION: ICD-10-CM

## 2023-09-28 DIAGNOSIS — E78.00 HYPERCHOLESTEROLEMIA: ICD-10-CM

## 2023-09-28 DIAGNOSIS — J06.9 VIRAL UPPER RESPIRATORY ILLNESS: ICD-10-CM

## 2023-09-28 DIAGNOSIS — U07.1 COVID-19: Primary | ICD-10-CM

## 2023-09-28 LAB
SARS-COV-2 AG UPPER RESP QL IA: POSITIVE
VALID CONTROL: ABNORMAL

## 2023-09-28 PROCEDURE — 99214 OFFICE O/P EST MOD 30 MIN: CPT | Performed by: FAMILY MEDICINE

## 2023-09-28 PROCEDURE — 87811 SARS-COV-2 COVID19 W/OPTIC: CPT | Performed by: FAMILY MEDICINE

## 2023-09-28 NOTE — PROGRESS NOTES
Subjective:      Patient ID: hCicho Ramirez is a 44 y.o. male. Cough  Generalized Body Aches  Headache  For 4 days    Cough  Associated symptoms include chills, headaches and myalgias. Pertinent negatives include no chest pain, eye redness, fever, rash, rhinorrhea, sore throat, shortness of breath or wheezing. Generalized Body Aches  Associated symptoms include congestion, headaches, coughing and diarrhea. Pertinent negatives include no eye discharge, eye itching, eye redness, rhinorrhea, sore throat, fever, chest pain, shortness of breath, wheezing, abdominal pain, constipation or rash. Headache      Past Medical History:   Diagnosis Date   • Allergic    • Depression    • Elevated liver enzymes    • Fatty liver    • Heartburn    • Hypertension 04/2021   • Psychiatric disorder     depression   • Renal disorder     stones       Family History   Problem Relation Age of Onset   • Migraines Mother    • Depression Mother         Duane   • Crohn's disease Father    • Depression Father    • Colon cancer Father    • Cancer Father    • Liver disease Brother    • Tourette syndrome Brother    • Diabetes Paternal Grandmother        Past Surgical History:   Procedure Laterality Date   • COLONOSCOPY     • MI COLONOSCOPY FLX DX W/COLLJ SPEC WHEN PFRMD N/A 6/11/2018    Procedure: EGD AND COLONOSCOPY;  Surgeon: Marleen Gage MD;  Location: [facility]; Service: Gastroenterology   • MI LITHOTRIPSY XTRCORP SHOCK WAVE Left 11/4/2016    Procedure: [facility] [place] SHOCKWAVE (ESWL); Surgeon: Jamal Galvez MD;  Location: [facility];  Service: Urology   • TOOTH EXTRACTION     • UPPER GASTROINTESTINAL ENDOSCOPY          reports that he has been smoking cigarettes. He started smoking about 22 years ago. He has a 5.00 pack-year smoking history. He has never used smokeless tobacco. He reports current alcohol use. He reports that he does not use drugs.       Current Outpatient Medications:   •  amLODIPine (NORVASC) 5 mg tablet, TAKE 1 TABLET (5 MG TOTAL) BY MOUTH DAILY. , Disp: 90 tablet, Rfl: 1  •  atorvastatin (LIPITOR) 10 mg tablet, TAKE 1 TABLET BY MOUTH EVERY DAY, Disp: 90 tablet, Rfl: 2  •  Cyanocobalamin 1000 MCG SUBL, Place 1 tablet (1,000 mcg total) under the tongue daily, Disp: 90 tablet, Rfl: 0  •  dicyclomine (BENTYL) 20 mg tablet, TAKE 1 TABLET BY MOUTH 3 TIMES A DAY., Disp: 270 tablet, Rfl: 1  •  ketorolac (TORADOL) 10 mg tablet, Take 1 tablet (10 mg total) by mouth every 6 (six) hours as needed for moderate pain, Disp: 10 tablet, Rfl: 0  •  magnesium Oxide (MAG-OX) 400 mg TABS, Take 1 tablet (400 mg total) by mouth daily, Disp: 90 tablet, Rfl: 0  •  meclizine (ANTIVERT) 12.5 MG tablet, Take 1 tablet (12.5 mg total) by mouth 3 (three) times a day as needed for dizziness, Disp: 30 tablet, Rfl: 0  •  omeprazole (PriLOSEC) 40 MG capsule, TAKE 1 CAPSULE BY MOUTH 2 TIMES A DAY BEFORE MEALS., Disp: 180 capsule, Rfl: 0  •  PARoxetine (PAXIL) 10 mg tablet, TAKE 1 TABLET BY MOUTH EVERY DAY, Disp: 90 tablet, Rfl: 0  •  rizatriptan (MAXALT) 10 mg tablet, TAKE 1 TABLET (10 MG TOTAL) BY MOUTH AS NEEDED FOR MIGRAINE TAKE AT THE ONSET OF MIGRAINE; IF SYMPTOMS CONTINUE OR RETURN, MAY TAKE ANOTHER DOSE AT LEAST 2 HOURS AFTER FIRST DOSE. TAKE NO MORE THAN 2 DOSES IN A DAY., Disp: 9 tablet, Rfl: 0  •  valsartan (DIOVAN) 320 MG tablet, Take 1 tablet (320 mg total) by mouth daily, Disp: 90 tablet, Rfl: 1    The following portions of the patient's history were reviewed and updated as appropriate: allergies, current medications, past family history, past medical history, past social history, past surgical history and problem list.    Review of Systems   Constitutional: Positive for activity change, appetite change and chills. Negative for fever. HENT: Positive for congestion. Negative for rhinorrhea and sore throat. Eyes: Negative for discharge, redness and itching. Respiratory: Positive for cough.  Negative for chest tightness, shortness of breath and wheezing. Cardiovascular: Negative for chest pain and palpitations. Gastrointestinal: Positive for diarrhea. Negative for abdominal pain and constipation. Genitourinary: Negative for dysuria. Musculoskeletal: Positive for myalgias. Skin: Negative for pallor and rash. Neurological: Positive for headaches. Negative for dizziness, weakness and numbness. PHQ-2/9 Depression Screening    Little interest or pleasure in doing things: 0 - not at all  Feeling down, depressed, or hopeless: 0 - not at all  Trouble falling or staying asleep, or sleeping too much: 0 - not at all  Feeling tired or having little energy: 0 - not at all  Poor appetite or overeatin - not at all  Feeling bad about yourself - or that you are a failure or have let yourself or your family down: 0 - not at all  Trouble concentrating on things, such as reading the newspaper or watching television: 0 - not at all  Moving or speaking so slowly that other people could have noticed. Or the opposite - being so fidgety or restless that you have been moving around a lot more than usual: 0 - not at all  Thoughts that you would be better off dead, or of hurting yourself in some way: 0 - not at all  PHQ-9 Score: 0   PHQ-9 Interpretation: No or Minimal depression              Objective:    /94 (BP Location: Left arm, Patient Position: Sitting, Cuff Size: Adult)   Pulse 92   Temp 98.2 °F (36.8 °C) (Tympanic)   Resp 18   Ht 5' 8" (1.727 m)   Wt 92.1 kg (203 lb)   SpO2 98%   BMI 30.87 kg/m²      Physical Exam  Vitals and nursing note reviewed. Constitutional:       Appearance: Normal appearance. He is obese. HENT:      Right Ear: Tympanic membrane, ear canal and external ear normal.      Left Ear: Tympanic membrane, ear canal and external ear normal.      Nose: Congestion present. Eyes:      General:         Right eye: No discharge. Left eye: No discharge.       Conjunctiva/sclera: Conjunctivae normal.   Cardiovascular:      Rate and Rhythm: Normal rate and regular rhythm. Heart sounds: No murmur heard. Pulmonary:      Effort: Pulmonary effort is normal.      Breath sounds: Normal breath sounds. No wheezing. Abdominal:      General: There is no distension. Palpations: Abdomen is soft. Tenderness: There is no abdominal tenderness. Musculoskeletal:      Right lower leg: No edema. Left lower leg: No edema. Skin:     Findings: No lesion or rash. Neurological:      Mental Status: He is alert. Mental status is at baseline. Psychiatric:         Mood and Affect: Mood normal.         Thought Content:  Thought content normal.           Recent Results (from the past 8736 hour(s))   CBC and differential    Collection Time: 11/30/22  5:57 AM   Result Value Ref Range    WBC 6.07 4.31 - 10.16 Thousand/uL    RBC 5.08 3.88 - 5.62 Million/uL    Hemoglobin 14.9 12.0 - 17.0 g/dL    Hematocrit 43.9 36.5 - 49.3 %    MCV 86 82 - 98 fL    MCH 29.3 26.8 - 34.3 pg    MCHC 33.9 31.4 - 37.4 g/dL    RDW 12.2 11.6 - 15.1 %    MPV 9.4 8.9 - 12.7 fL    Platelets 166 890 - 870 Thousands/uL    nRBC 0 /100 WBCs    Neutrophils Relative 53 43 - 75 %    Immat GRANS % 1 0 - 2 %    Lymphocytes Relative 36 14 - 44 %    Monocytes Relative 7 4 - 12 %    Eosinophils Relative 2 0 - 6 %    Basophils Relative 1 0 - 1 %    Neutrophils Absolute 3.29 1.85 - 7.62 Thousands/µL    Immature Grans Absolute 0.03 0.00 - 0.20 Thousand/uL    Lymphocytes Absolute 2.18 0.60 - 4.47 Thousands/µL    Monocytes Absolute 0.44 0.17 - 1.22 Thousand/µL    Eosinophils Absolute 0.10 0.00 - 0.61 Thousand/µL    Basophils Absolute 0.03 0.00 - 0.10 Thousands/µL   Comprehensive metabolic panel    Collection Time: 11/30/22  5:57 AM   Result Value Ref Range    Sodium 139 135 - 147 mmol/L    Potassium 4.1 3.5 - 5.3 mmol/L    Chloride 105 96 - 108 mmol/L    CO2 25 21 - 32 mmol/L    ANION GAP 9 4 - 13 mmol/L    BUN 18 5 - 25 mg/dL    Creatinine 1.00 0.60 - 1.30 mg/dL    Glucose 105 65 - 140 mg/dL    Calcium 9.8 8.4 - 10.2 mg/dL    AST 47 (H) 13 - 39 U/L    ALT 95 (H) 7 - 52 U/L    Alkaline Phosphatase 61 34 - 104 U/L    Total Protein 7.0 6.4 - 8.4 g/dL    Albumin 4.3 3.5 - 5.0 g/dL    Total Bilirubin 0.46 0.20 - 1.00 mg/dL    eGFR 93 ml/min/1.73sq m   Lipase    Collection Time: 11/30/22  5:57 AM   Result Value Ref Range    Lipase 28 11 - 82 u/L   UA w Reflex to Microscopic w Reflex to Culture    Collection Time: 11/30/22  5:57 AM    Specimen: Urine, Clean Catch   Result Value Ref Range    Color, UA Yellow     Clarity, UA Clear     Specific Gravity, UA 1.040 (H) 1.003 - 1.030    pH, UA 5.5 4.5, 5.0, 5.5, 6.0, 6.5, 7.0, 7.5, 8.0    Leukocytes, UA Negative Negative    Nitrite, UA Negative Negative    Protein, UA 30 (1+) (A) Negative mg/dl    Glucose, UA Negative Negative mg/dl    Ketones, UA Negative Negative mg/dl    Urobilinogen, UA <2.0 <2.0 mg/dl mg/dl    Bilirubin, UA Negative Negative    Occult Blood, UA Large (A) Negative   Urine Microscopic    Collection Time: 11/30/22  5:57 AM   Result Value Ref Range    RBC, UA Innumerable (A) None Seen, 1-2 /hpf    WBC, UA 2-4 (A) None Seen, 1-2 /hpf    Epithelial Cells Occasional None Seen, Occasional /hpf    Bacteria, UA None Seen None Seen, Occasional /hpf    MUCUS THREADS Occasional (A) None Seen    Ca Oxalate [place], UA Occasional (A) None Seen /hpf   Lipid panel    Collection Time: 03/06/23 12:34 PM   Result Value Ref Range    Cholesterol 249 (H) See Comment mg/dL    Triglycerides 438 (H) See Comment mg/dL    HDL, Direct 43 >=40 mg/dL    LDL Calculated      Non-HDL-Chol (CHOL-HDL) 206 mg/dl   Comprehensive metabolic panel    Collection Time: 03/06/23 12:34 PM   Result Value Ref Range    Sodium 138 135 - 147 mmol/L    Potassium 3.9 3.5 - 5.3 mmol/L    Chloride 103 96 - 108 mmol/L    CO2 29 21 - 32 mmol/L    ANION GAP 6 4 - 13 mmol/L    BUN 20 5 - 25 mg/dL    Creatinine 0.83 0.60 - 1.30 mg/dL    Glucose, Fasting 86 65 - 99 mg/dL    Calcium 9.6 8.4 - 10.2 mg/dL    AST 43 (H) 13 - 39 U/L    ALT 92 (H) 7 - 52 U/L    Alkaline Phosphatase 48 34 - 104 U/L    Total Protein 7.3 6.4 - 8.4 g/dL    Albumin 4.6 3.5 - 5.0 g/dL    Total Bilirubin 0.66 0.20 - 1.00 mg/dL    eGFR 109 ml/min/1.73sq m   POCT rapid strepA    Collection Time: 06/19/23  3:16 PM   Result Value Ref Range     RAPID STREP A Negative Negative   ECG 12 lead    Collection Time: 07/27/23  3:58 AM   Result Value Ref Range    Ventricular Rate 95 BPM    Atrial Rate 95 BPM    UT Interval 160 ms    QRSD Interval 96 ms    QT Interval 336 ms    QTC Interval 422 ms    P [place] 69 degrees    QRS Axis 50 degrees    T Wave Axis 45 degrees   CBC and differential    Collection Time: 07/27/23  4:45 AM   Result Value Ref Range    WBC 5.49 4.31 - 10.16 Thousand/uL    RBC 5.15 3.88 - 5.62 Million/uL    Hemoglobin 15.0 12.0 - 17.0 g/dL    Hematocrit 44.6 36.5 - 49.3 %    MCV 87 82 - 98 fL    MCH 29.1 26.8 - 34.3 pg    MCHC 33.6 31.4 - 37.4 g/dL    RDW 12.3 11.6 - 15.1 %    MPV 9.8 8.9 - 12.7 fL    Platelets 067 920 - 659 Thousands/uL    nRBC 0 /100 WBCs    Neutrophils Relative 48 43 - 75 %    Immat GRANS % 0 0 - 2 %    Lymphocytes Relative 41 14 - 44 %    Monocytes Relative 8 4 - 12 %    Eosinophils Relative 2 0 - 6 %    Basophils Relative 1 0 - 1 %    Neutrophils Absolute 2.66 1.85 - 7.62 Thousands/µL    Immature Grans Absolute 0.01 0.00 - 0.20 Thousand/uL    Lymphocytes Absolute 2.24 0.60 - 4.47 Thousands/µL    Monocytes Absolute 0.42 0.17 - 1.22 Thousand/µL    Eosinophils Absolute 0.12 0.00 - 0.61 Thousand/µL    Basophils Absolute 0.04 0.00 - 0.10 Thousands/µL   Comprehensive metabolic panel    Collection Time: 07/27/23  4:45 AM   Result Value Ref Range    Sodium 136 135 - 147 mmol/L    Potassium 3.5 3.5 - 5.3 mmol/L    Chloride 103 96 - 108 mmol/L    CO2 26 21 - 32 mmol/L    ANION GAP 7 mmol/L    BUN 16 5 - 25 mg/dL    Creatinine 0.91 0.60 - 1.30 mg/dL    Glucose 122 65 - 140 mg/dL    Calcium 9.5 8.4 - 10.2 mg/dL    AST 44 (H) 13 - 39 U/L    ALT 77 (H) 7 - 52 U/L    Alkaline Phosphatase 56 34 - 104 U/L    Total Protein 7.0 6.4 - 8.4 g/dL    Albumin 4.5 3.5 - 5.0 g/dL    Total Bilirubin 0.55 0.20 - 1.00 mg/dL    eGFR 104 ml/min/1.73sq m   Lipase    Collection Time: 07/27/23  4:45 AM   Result Value Ref Range    Lipase 30 11 - 82 u/L   POCT Rapid Covid Ag    Collection Time: 09/28/23 11:41 AM   Result Value Ref Range    POCT SARS-CoV-2 Ag Positive (A) Negative    VALID CONTROL Valid        Laboratory Results: I have personally reviewed the pertinent laboratory results/reports       Assessment/Plan:  Problem List Items Addressed This Visit        Cardiovascular and Mediastinum    Essential hypertension       Other    Hypercholesterolemia   Other Visit Diagnoses     COVID-19    -  Primary    Relevant Orders    POCT Rapid Covid Ag (Completed)    Viral upper respiratory illness              I recommended continued isolationfor 5 days and additionally until at least 24 hours have passed since recovery defined as resolution of fever without the use of fever-reducing medications AND improvement in COVID symptoms. Work excuse given  Discussed Paxlovid, he is day 4 of symptoms does not want to start it, also could worsen his diarrhea. He will take vit c , zinc, vit d, tylenol/motrin as needed for pain or fever. Stay hydrated  Can hold atorvastatin due to body aches for 2-3 days and then resume  Avoid decongestants as this may elevated blood pressure  Spent >30 min in patient's care, >50% time spent face to face and in counseling. Read package inserts for all medications before starting a new medications, call me if you have any questions. Patient was given opportunity to ask questions and all questions were answered. Disclaimer: Portions of the record may have been created with voice recognition software.  Occasional wrong word or "sound a like" substitutions may have occurred due to the inherent limitations of voice recognition software. Read the chart carefully and recognize, using context, where substitutions have occurred. I have used the Epic copy/forward function to compose this note. I have reviewed my current note to ensure it reflects the current patient status, exam, assessment and plan.

## 2023-09-28 NOTE — LETTER
September 28, 2023     Patient: Julia Adan  YOB: 1980  Date of Visit: 9/28/2023      To Whom it May Concern:    Brian Lanza is under my professional care. Sirena Talbot was seen in my office on 9/28/2023. patient tested positive for COVID-19 infection in the office today. Sirena Talbot may return to work on 9/30/2023 if he remains without fever for 24 hours and symptoms are improving. Recommend wearing the mast at work and public places for additional week thereafter. If you have any questions or concerns, please don't hesitate to call.          Sincerely,          Joseline Hernandez MD        CC: No Recipients

## 2023-12-08 ENCOUNTER — APPOINTMENT (OUTPATIENT)
Dept: LAB | Facility: CLINIC | Age: 43
End: 2023-12-08
Payer: COMMERCIAL

## 2023-12-08 DIAGNOSIS — G43.109 MIGRAINE WITH AURA AND WITHOUT STATUS MIGRAINOSUS, NOT INTRACTABLE: ICD-10-CM

## 2023-12-08 LAB
BUN SERPL-MCNC: 12 MG/DL (ref 5–25)
CREAT SERPL-MCNC: 1 MG/DL (ref 0.6–1.3)
GFR SERPL CREATININE-BSD FRML MDRD: 92 ML/MIN/1.73SQ M

## 2023-12-08 PROCEDURE — 82565 ASSAY OF CREATININE: CPT

## 2023-12-08 PROCEDURE — 36415 COLL VENOUS BLD VENIPUNCTURE: CPT

## 2023-12-08 PROCEDURE — 84520 ASSAY OF UREA NITROGEN: CPT

## 2023-12-27 ENCOUNTER — HOSPITAL ENCOUNTER (OUTPATIENT)
Dept: MRI IMAGING | Facility: HOSPITAL | Age: 43
Discharge: HOME | End: 2023-12-27
Attending: PSYCHIATRY & NEUROLOGY
Payer: COMMERCIAL

## 2023-12-27 DIAGNOSIS — E34.8 PINEAL GLAND CYST: ICD-10-CM

## 2023-12-27 PROCEDURE — A9585 GADOBUTROL INJECTION: HCPCS | Performed by: PSYCHIATRY & NEUROLOGY

## 2023-12-27 PROCEDURE — 70553 MRI BRAIN STEM W/O & W/DYE: CPT

## 2023-12-27 PROCEDURE — G1004 CDSM NDSC: HCPCS

## 2023-12-27 RX ORDER — GADOBUTROL 604.72 MG/ML
9 INJECTION INTRAVENOUS
Status: COMPLETED | OUTPATIENT
Start: 2023-12-27 | End: 2023-12-27

## 2023-12-27 RX ADMIN — GADOBUTROL 9 ML: 604.72 INJECTION INTRAVENOUS at 15:01

## 2024-05-25 DIAGNOSIS — I10 ESSENTIAL HYPERTENSION: ICD-10-CM

## 2024-05-27 RX ORDER — VALSARTAN 320 MG/1
320 TABLET ORAL DAILY
Qty: 90 TABLET | Refills: 1 | Status: SHIPPED | OUTPATIENT
Start: 2024-05-27

## 2024-06-29 DIAGNOSIS — I10 ESSENTIAL HYPERTENSION: ICD-10-CM

## 2024-06-29 RX ORDER — AMLODIPINE BESYLATE 5 MG/1
5 TABLET ORAL DAILY
Qty: 90 TABLET | Refills: 1 | Status: SHIPPED | OUTPATIENT
Start: 2024-06-29

## 2024-08-05 DIAGNOSIS — F32.A CHRONIC DEPRESSION: ICD-10-CM

## 2024-08-06 RX ORDER — PAROXETINE 10 MG/1
10 TABLET, FILM COATED ORAL DAILY
Qty: 30 TABLET | Refills: 0 | Status: SHIPPED | OUTPATIENT
Start: 2024-08-06

## 2024-08-11 NOTE — ED ATTENDING ATTESTATION
7/27/2023  IErwin MD, saw and evaluated the patient. I have discussed the patient with the resident/non-physician practitioner and agree with the resident's/non-physician practitioner's findings, Plan of Care, and MDM as documented in the resident's/non-physician practitioner's note, except where noted. All available labs and Radiology studies were reviewed. I was present for key portions of any procedure(s) performed by the resident/non-physician practitioner and I was immediately available to provide assistance. At this point I agree with the current assessment done in the Emergency Department. I have conducted an independent evaluation of this patient a history and physical is as follows: Is a 81-year-old male patient presenting to the ED for complaint of vertigo. Patient states that his symptoms have been present for the past few days. He denies any other significantly related symptoms. He recently did have a viral infection. He denies any visual complaints. He denies any tinnitus. He denies any focal weakness numbness or tingling, facial droop slurred speech. His exam is remarkable for positive Romberg, as well as an unsteady gait due to sensation of motion. His symptoms did improve significantly with meclizine, and he was able to walk in a straight line without any difficulty. His Lizet-Hallpike was negative. His differential diagnosis includes vestibular neuritis versus labyrinthitis versus BPPV versus less likely central vertigo versus other. Patient does not have any symptoms of central vertigo, and his symptoms are not reminiscent of a central process. Patient does not have any focal neurologic deficits to suggest this either. Patient had a CBC, metabolic panel, EKG, all of which were unremarkable.   Patient was then reevaluated, walk, and he did well on his walk test the management plan was discussed in detail with the patient at bedside and all questions were answered. Strict ED return instructions were discussed at bedside. Prior to discharge, both verbal and written instructions were provided. We discussed the signs and symptoms that should prompt the patient to return to the ED. All questions were answered and the patient was comfortable with the plan of care and discharged home. The patient agrees to return to the Emergency Department for concerns and/or progression of illness. Patient had bloodwork, ekg all interpreted by myself.       ED Course         Critical Care Time  Procedures [Time Spent: ___ minutes] : I have spent [unfilled] minutes of time on the encounter.

## 2025-06-17 ENCOUNTER — HOSPITAL ENCOUNTER (EMERGENCY)
Facility: HOSPITAL | Age: 45
Discharge: HOME | End: 2025-06-17
Attending: EMERGENCY MEDICINE | Admitting: EMERGENCY MEDICINE
Payer: COMMERCIAL

## 2025-06-17 ENCOUNTER — APPOINTMENT (EMERGENCY)
Dept: RADIOLOGY | Facility: HOSPITAL | Age: 45
End: 2025-06-17
Payer: COMMERCIAL

## 2025-06-17 VITALS
OXYGEN SATURATION: 96 % | DIASTOLIC BLOOD PRESSURE: 63 MMHG | BODY MASS INDEX: 27.55 KG/M2 | TEMPERATURE: 98.2 F | WEIGHT: 181.22 LBS | RESPIRATION RATE: 20 BRPM | HEART RATE: 74 BPM | SYSTOLIC BLOOD PRESSURE: 118 MMHG

## 2025-06-17 DIAGNOSIS — R07.9 CHEST PAIN WITH LOW RISK OF ACUTE CORONARY SYNDROME: Primary | ICD-10-CM

## 2025-06-17 DIAGNOSIS — R06.02 SOB (SHORTNESS OF BREATH): ICD-10-CM

## 2025-06-17 DIAGNOSIS — R79.89 ELEVATED TSH: ICD-10-CM

## 2025-06-17 LAB
2HR DELTA HS TROPONIN: 0 NG/L
ALBUMIN SERPL BCG-MCNC: 4.6 G/DL (ref 3.5–5)
ALP SERPL-CCNC: 49 U/L (ref 34–104)
ALT SERPL W P-5'-P-CCNC: 28 U/L (ref 7–52)
ANION GAP SERPL CALCULATED.3IONS-SCNC: 8 MMOL/L (ref 4–13)
AST SERPL W P-5'-P-CCNC: 28 U/L (ref 13–39)
BASOPHILS # BLD AUTO: 0.07 THOUSANDS/ÂΜL (ref 0–0.1)
BASOPHILS NFR BLD AUTO: 1 % (ref 0–1)
BILIRUB SERPL-MCNC: 0.61 MG/DL (ref 0.2–1)
BUN SERPL-MCNC: 18 MG/DL (ref 5–25)
CALCIUM SERPL-MCNC: 9.5 MG/DL (ref 8.4–10.2)
CARDIAC TROPONIN I PNL SERPL HS: 3 NG/L (ref ?–50)
CARDIAC TROPONIN I PNL SERPL HS: 3 NG/L (ref ?–50)
CHLORIDE SERPL-SCNC: 104 MMOL/L (ref 96–108)
CO2 SERPL-SCNC: 26 MMOL/L (ref 21–32)
CREAT SERPL-MCNC: 0.89 MG/DL (ref 0.6–1.3)
D DIMER PPP FEU-MCNC: <0.27 UG/ML FEU
EOSINOPHIL # BLD AUTO: 0.14 THOUSAND/ÂΜL (ref 0–0.61)
EOSINOPHIL NFR BLD AUTO: 2 % (ref 0–6)
ERYTHROCYTE [DISTWIDTH] IN BLOOD BY AUTOMATED COUNT: 12.5 % (ref 11.6–15.1)
GFR SERPL CREATININE-BSD FRML MDRD: 104 ML/MIN/1.73SQ M
GLUCOSE SERPL-MCNC: 105 MG/DL (ref 65–140)
HCT VFR BLD AUTO: 46.2 % (ref 36.5–49.3)
HGB BLD-MCNC: 16 G/DL (ref 12–17)
IMM GRANULOCYTES # BLD AUTO: 0.01 THOUSAND/UL (ref 0–0.2)
IMM GRANULOCYTES NFR BLD AUTO: 0 % (ref 0–2)
LYMPHOCYTES # BLD AUTO: 3.46 THOUSANDS/ÂΜL (ref 0.6–4.47)
LYMPHOCYTES NFR BLD AUTO: 49 % (ref 14–44)
MCH RBC QN AUTO: 29.5 PG (ref 26.8–34.3)
MCHC RBC AUTO-ENTMCNC: 34.6 G/DL (ref 31.4–37.4)
MCV RBC AUTO: 85 FL (ref 82–98)
MONOCYTES # BLD AUTO: 0.62 THOUSAND/ÂΜL (ref 0.17–1.22)
MONOCYTES NFR BLD AUTO: 9 % (ref 4–12)
NEUTROPHILS # BLD AUTO: 2.7 THOUSANDS/ÂΜL (ref 1.85–7.62)
NEUTS SEG NFR BLD AUTO: 39 % (ref 43–75)
NRBC BLD AUTO-RTO: 0 /100 WBCS
PLATELET # BLD AUTO: 325 THOUSANDS/UL (ref 149–390)
PMV BLD AUTO: 9.7 FL (ref 8.9–12.7)
POTASSIUM SERPL-SCNC: 3.6 MMOL/L (ref 3.5–5.3)
PROT SERPL-MCNC: 7 G/DL (ref 6.4–8.4)
RBC # BLD AUTO: 5.43 MILLION/UL (ref 3.88–5.62)
SODIUM SERPL-SCNC: 138 MMOL/L (ref 135–147)
T4 FREE SERPL-MCNC: 0.84 NG/DL (ref 0.61–1.12)
TSH SERPL DL<=0.05 MIU/L-ACNC: 6.53 UIU/ML (ref 0.45–4.5)
WBC # BLD AUTO: 7 THOUSAND/UL (ref 4.31–10.16)

## 2025-06-17 PROCEDURE — 84484 ASSAY OF TROPONIN QUANT: CPT

## 2025-06-17 PROCEDURE — 85379 FIBRIN DEGRADATION QUANT: CPT

## 2025-06-17 PROCEDURE — 84443 ASSAY THYROID STIM HORMONE: CPT

## 2025-06-17 PROCEDURE — 71045 X-RAY EXAM CHEST 1 VIEW: CPT

## 2025-06-17 PROCEDURE — 99285 EMERGENCY DEPT VISIT HI MDM: CPT

## 2025-06-17 PROCEDURE — 85025 COMPLETE CBC W/AUTO DIFF WBC: CPT

## 2025-06-17 PROCEDURE — 93005 ELECTROCARDIOGRAM TRACING: CPT

## 2025-06-17 PROCEDURE — 99285 EMERGENCY DEPT VISIT HI MDM: CPT | Performed by: EMERGENCY MEDICINE

## 2025-06-17 PROCEDURE — 80053 COMPREHEN METABOLIC PANEL: CPT

## 2025-06-17 PROCEDURE — 84439 ASSAY OF FREE THYROXINE: CPT

## 2025-06-17 PROCEDURE — 36415 COLL VENOUS BLD VENIPUNCTURE: CPT

## 2025-06-17 NOTE — ED ATTENDING ATTESTATION
6/17/2025  I, Romeo Rosen MD, saw and evaluated the patient. I have discussed the patient with the resident/non-physician practitioner and agree with the resident's/non-physician practitioner's findings, Plan of Care, and MDM as documented in the resident's/non-physician practitioner's note, except where noted. All available labs and Radiology studies were reviewed.  I was present for key portions of any procedure(s) performed by the resident/non-physician practitioner and I was immediately available to provide assistance.       At this point I agree with the current assessment done in the Emergency Department.  I have conducted an independent evaluation of this patient a history and physical is as follows: Patient is a 44 year old male with right sided chest pain with nausea, sob, sweating, palpitations tonight at work and BP was elevated. States compliance with BP medication. States he quit smoking 2 days ago. No fever. No cough. No vomiting. No travel. Denies excess caffeine use. Grandfather had MI in his 60s. Was last seen at [facility] on 9/28/23 for covid 19. [employer] website checked on this patient and last Rx filled was on 6/13/25 for lisdexamfetamine for 30 day supply. NCAT. No scleral icterus. Moist mucous membranes. Oropharynx clear. Heart regular without murmur. Nontender chest wall. Lungs clear. Abdomen soft and nontender. Good bowel sounds. No edema. No rash noted. DDX including but not limited to: ACS, MI, PE, PTX, pneumonia, doubt dissection; pleurisy, pericarditis, myocarditis; doubt rhabdomyolysis, GI etiology, pneumomediastinum, mediastinitis, esophageal rupture. Metabolic abnormality, thyroid disease, adverse reaction, intracranial etiology, renal failure, CHF, covid. I reviewed EKG. Will check labs, CXR.     ED Course         Critical Care Time  Procedures

## 2025-06-17 NOTE — DISCHARGE INSTRUCTIONS
Please schedule an appointment with your PCP for follow-up of your chest pain and elevated TSH (thyroid) level.    Return to the Emergency Department sooner if increased pain, fever, vomiting, difficulty breathing, rash.

## 2025-06-17 NOTE — ED PROVIDER NOTES
Time reflects when diagnosis was documented in both MDM as applicable and the Disposition within this note       Time User Action Codes Description Comment    6/17/2025  5:53 AM Michelle Arzola [R07.9] Chest pain with low risk of acute coronary syndrome     6/17/2025  6:11 AM Michelle Arzola [R06.02] SOB (shortness of breath)     6/17/2025  6:12 AM Michelle Arzola [R79.89] Elevated TSH           ED Disposition       ED Disposition   Discharge    Condition   Stable    Date/Time   Tue Jun 17, 2025  5:54 AM    Comment   Brian M Duane discharge to home/self care.                   Assessment & Plan       Medical Decision Making  44-year-old male with a PMH of HTN, HLD, migraine, and GERD presenting to the ED from home for chest pain with associated SOB, sweating, nausea, and dizziness that started around 9 PM last night.     DDX including but not limited to: ACS, MI, PE, PTX, pneumonia, pleurisy, pericarditis, myocarditis, GI etiology; doubt dissection, pneumomediastinum, mediastinitis, esophageal rupture.     Patient presents afebrile and hemodynamically stable.  No acute abnormalities on physical exam. EKG shows NSR at 79 bpm with no acute ischemic changes. Initial troponin of 3 with a 2h delta of 0. Doubt ACS. HEART score of 3. Wells criteria for PE score of 3, indicating moderate risk, so d-dimer obtained which was negative. Labs significant for an elevated TSH of 6.529 with pending T4. CBC and CMP consistent with baseline. CXR shows no acute cardiopulmonary abnormality. Discussed results with the patient along with his HEART score indicating low risk for major cardiac events. Patient stable and comfortable with discharge home with return precautions and recommendations to schedule an appointment with his PCP for ongoing follow-up of his chest pain and his elevated TSH level.     Amount and/or Complexity of Data Reviewed  Labs: ordered.  Radiology: ordered and independent interpretation  performed.        ED Course as of 06/17/25 0656   Tue Jun 17, 2025   0440 Patient resting comfortably. States chest pain has improved.       Medications - No data to display    ED Risk Strat Scores   HEART Risk Score      Flowsheet Row Most Recent Value   Heart Score Risk Calculator    History 1 Filed at: 06/17/2025 0548   ECG 0 Filed at: 06/17/2025 0548   Age 0 Filed at: 06/17/2025 0548   Risk Factors 2 Filed at: 06/17/2025 0548   Troponin 0 Filed at: 06/17/2025 0548   HEART Score 3 Filed at: 06/17/2025 0548          HEART Risk Score      Flowsheet Row Most Recent Value   Heart Score Risk Calculator    History 1 Filed at: 06/17/2025 0548   ECG 0 Filed at: 06/17/2025 0548   Age 0 Filed at: 06/17/2025 0548   Risk Factors 2 Filed at: 06/17/2025 0548   Troponin 0 Filed at: 06/17/2025 0548   HEART Score 3 Filed at: 06/17/2025 0548                    No data recorded        SBIRT 20yo+      Flowsheet Row Most Recent Value   Initial Alcohol Screen: US AUDIT-C     1. How often do you have a drink containing alcohol? 1 Filed at: 06/17/2025 0252   2. How many drinks containing alcohol do you have on a typical day you are drinking?  1 Filed at: 06/17/2025 0252   3a. Male UNDER 65: How often do you have five or more drinks on one occasion? 0 Filed at: 06/17/2025 0252   3b. FEMALE Any Age, or MALE 65+: How often do you have 4 or more drinks on one occassion? 0 Filed at: 06/17/2025 0252   Audit-C Score 2 Filed at: 06/17/2025 0252   BALDEV: How many times in the past year have you...    Used an illegal drug or used a prescription medication for non-medical reasons? Never Filed at: 06/17/2025 0252            Josh' Criteria for PE      Flowsheet Row Most Recent Value   Wells' Criteria for PE    Clinical signs and symptoms of DVT 0 Filed at: 06/17/2025 0600   PE is primary diagnosis or equally likely 3 Filed at: 06/17/2025 0600   HR >100 0 Filed at: 06/17/2025 0600   Immobilization at least 3 days or Surgery in the previous 4 weeks 0  Filed at: 06/17/2025 0600   Previous, objectively diagnosed PE or DVT 0 Filed at: 06/17/2025 0600   Hemoptysis 0 Filed at: 06/17/2025 0600   Malignancy with treatment within 6 months or palliative 0 Filed at: 06/17/2025 0600   Wells' Criteria Total 3 Filed at: 06/17/2025 0600                        History of Present Illness       Chief Complaint   Patient presents with    Chest Pain     C/O Feeling of heart racing On and off X 2 days and R sided CP that started at 9 pm tonight with SOB and dizziness. Pt reports he felt diaphoretic during tonight CP episode as well.        Past Medical History[1]   Past Surgical History[2]   Family History[3]   Social History[4]   E-Cigarette/Vaping    E-Cigarette Use Never User       E-Cigarette/Vaping Substances    Nicotine No     THC No     CBD No     Flavoring No     Other No     Unknown No       I have reviewed and agree with the history as documented.     44-year-old male with a PMH of HTN, HLD, migraine, and GERD presenting to the ED from home for chest pain with associated SOB, sweating, nausea, and dizziness that started around 9 PM last night. States he was at work at [employer] when the chest pain started. States that the pain might be slightly worse when walking, but is not entirely sure. Notes the chest pain is right-sided and radiates into his right arm. States that he has also been experiencing intermittent heart palpitations, specifically when he smokes, over the past couple weeks, so he quit 2 days ago. Denies any history of MI or other cardiac abnormalities. Denies any fever, chills, nausea, vomiting, abdominal pain, urinary symptoms, or diarrhea.           Review of Systems   Constitutional:  Positive for diaphoresis. Negative for chills and fever.   Respiratory:  Positive for shortness of breath.    Cardiovascular:  Positive for chest pain and palpitations.   Gastrointestinal:  Positive for nausea. Negative for abdominal pain, diarrhea and vomiting.    Genitourinary:  Negative for dysuria and hematuria.   Musculoskeletal:  Negative for arthralgias and myalgias.   Neurological:  Positive for dizziness and light-headedness. Negative for syncope and headaches.   All other systems reviewed and are negative.          Objective       ED Triage Vitals [06/17/25 0241]   Temperature Pulse Blood Pressure Respirations SpO2 Patient Position - Orthostatic VS   98.2 °F (36.8 °C) 94 133/72 18 98 % Sitting      Temp Source Heart Rate Source BP Location FiO2 (%) Pain Score    Oral Monitor Right arm -- --      Vitals      Date and Time Temp Pulse SpO2 Resp BP Pain Score FACES Pain Rating User   06/17/25 0600 -- 74 96 % 20 118/63 -- --    06/17/25 0530 -- 76 95 % 18 137/62 -- --    06/17/25 0500 -- 75 96 % 20 122/59 -- --    06/17/25 0430 -- 73 96 % 20 131/70 -- --    06/17/25 0400 -- 72 96 % -- 122/66 -- --    06/17/25 0241 98.2 °F (36.8 °C) 94 98 % 18 133/72 -- -- JAL            Physical Exam  Vitals and nursing note reviewed.   Constitutional:       General: He is not in acute distress.     Appearance: Normal appearance. He is well-developed and normal weight. He is not ill-appearing, toxic-appearing or diaphoretic.   HENT:      Head: Normocephalic and atraumatic.      Right Ear: External ear normal.      Left Ear: External ear normal.      Nose: Nose normal.      Mouth/Throat:      Mouth: Mucous membranes are moist.     Eyes:      General: No scleral icterus.        Right eye: No discharge.         Left eye: No discharge.      Extraocular Movements: Extraocular movements intact.      Conjunctiva/sclera: Conjunctivae normal.       Cardiovascular:      Rate and Rhythm: Normal rate and regular rhythm.      Heart sounds: Normal heart sounds. No murmur heard.  Pulmonary:      Effort: Pulmonary effort is normal. No respiratory distress.      Breath sounds: Normal breath sounds. No wheezing, rhonchi or rales.   Chest:      Chest wall: No tenderness.   Abdominal:       General: Abdomen is flat. There is no distension.      Palpations: Abdomen is soft.      Tenderness: There is no abdominal tenderness. There is no guarding or rebound.     Musculoskeletal:         General: Normal range of motion.      Cervical back: Normal range of motion.      Right lower leg: No edema.      Left lower leg: No edema.     Skin:     General: Skin is warm and dry.      Capillary Refill: Capillary refill takes less than 2 seconds.     Neurological:      General: No focal deficit present.      Mental Status: He is alert and oriented to person, place, and time. Mental status is at baseline.     Psychiatric:         Mood and Affect: Mood normal.         Behavior: Behavior normal.         Results Reviewed       Procedure Component Value Units Date/Time    HS Troponin I 2hr [[id number]]  (Normal) Collected: 06/17/25 0512    Lab Status: Final result Specimen: Blood from Arm, Right Updated: 06/17/25 0543     hs TnI 2hr 3 ng/L      Delta 2hr hsTnI 0 ng/L     D-Dimer [[id number]]  (Normal) Collected: 06/17/25 0413    Lab Status: Final result Specimen: Blood from Arm, Right Updated: 06/17/25 0518     D-Dimer, Quant <0.27 ug/ml FEU     TSH, 3rd generation with Free T4 reflex [[id number]]  (Abnormal) Collected: 06/17/25 0310    Lab Status: Final result Specimen: Blood from Arm, Right Updated: 06/17/25 0414     TSH 3RD GENERATION 6.529 uIU/mL     T4, free [[id number]] Collected: 06/17/25 0310    Lab Status: In process Specimen: Blood from Arm, Right Updated: 06/17/25 0414    HS Troponin 0hr (reflex protocol) [[id number]]  (Normal) Collected: 06/17/25 0310    Lab Status: Final result Specimen: Blood from Arm, Right Updated: 06/17/25 0343     hs TnI 0hr 3 ng/L     Comprehensive metabolic panel [[id number]] Collected: 06/17/25 0310    Lab Status: Final result Specimen: Blood from Arm, Right Updated: 06/17/25 0336     Sodium 138 mmol/L      Potassium 3.6 mmol/L      Chloride 104 mmol/L      CO2 26 mmol/L      ANION GAP 8  mmol/L      BUN 18 mg/dL      Creatinine 0.89 mg/dL      Glucose 105 mg/dL      Calcium 9.5 mg/dL      AST 28 U/L      ALT 28 U/L      Alkaline Phosphatase 49 U/L      Total Protein 7.0 g/dL      Albumin 4.6 g/dL      Total Bilirubin 0.61 mg/dL      eGFR 104 ml/min/1.73sq m     Narrative:      National Kidney Disease Foundation guidelines for Chronic Kidney Disease (CKD):     Stage 1 with normal or high GFR (GFR > 90 mL/min/1.73 square meters)    Stage 2 Mild CKD (GFR = 60-89 mL/min/1.73 square meters)    Stage 3A Moderate CKD (GFR = 45-59 mL/min/1.73 square meters)    Stage 3B Moderate CKD (GFR = 30-44 mL/min/1.73 square meters)    Stage 4 Severe CKD (GFR = 15-29 mL/min/1.73 square meters)    Stage 5 End Stage CKD (GFR <15 mL/min/1.73 square meters)  Note: GFR calculation is accurate only with a steady state creatinine    CBC and differential [[id number]]  (Abnormal) Collected: 06/17/25 0310    Lab Status: Final result Specimen: Blood from Arm, Right Updated: 06/17/25 0331     WBC 7.00 Thousand/uL      RBC 5.43 Million/uL      Hemoglobin 16.0 g/dL      Hematocrit 46.2 %      MCV 85 fL      MCH 29.5 pg      MCHC 34.6 g/dL      RDW 12.5 %      MPV 9.7 fL      Platelets 325 Thousands/uL      nRBC 0 /100 WBCs      Segmented % 39 %      Immature Grans % 0 %      Lymphocytes % 49 %      Monocytes % 9 %      Eosinophils Relative 2 %      Basophils Relative 1 %      Absolute Neutrophils 2.70 Thousands/µL      Absolute Immature Grans 0.01 Thousand/uL      Absolute Lymphocytes 3.46 Thousands/µL      Absolute Monocytes 0.62 Thousand/µL      Eosinophils Absolute 0.14 Thousand/µL      Basophils Absolute 0.07 Thousands/µL             XR chest 1 view portable   ED Interpretation by Michelle Arzola MD (06/17 6069)   No acute cardiopulmonary abnormality.          ECG 12 Lead Documentation Only    Date/Time: 6/17/2025 2:59 AM    Performed by: Michelle Arzola MD  Authorized by: Michelle Arzola MD    Indications / Diagnosis:   Chest pain, SOB  Patient location:  ED  Previous ECG:     Previous ECG:  Compared to current    Comparison ECG info:  7/27/2023    Similarity:  No change  Interpretation:     Interpretation: normal    Rate:     ECG rate:  79 bpm    ECG rate assessment: normal    Rhythm:     Rhythm: sinus rhythm    Ectopy:     Ectopy: none    QRS:     QRS axis:  Normal    QRS intervals:  Normal  Conduction:     Conduction: normal    ST segments:     ST segments:  Normal  T waves:     T waves: normal    Comments:      Normal QTc 392 ms.      ED Medication and Procedure Management   Prior to Admission Medications   Prescriptions Last Dose Informant Patient Reported? Taking?   Cyanocobalamin 1000 MCG SUBL   No No   Sig: Place 1 tablet (1,000 mcg total) under the tongue daily   PARoxetine (PAXIL) 10 mg tablet   No No   Sig: Take 1 tablet (10 mg total) by mouth daily   amLODIPine (NORVASC) 5 mg tablet   No No   Sig: TAKE 1 TABLET (5 MG TOTAL) BY MOUTH DAILY.   atorvastatin (LIPITOR) 10 mg tablet   No No   Sig: TAKE 1 TABLET BY MOUTH EVERY DAY   dicyclomine (BENTYL) 20 mg tablet   No No   Sig: TAKE 1 TABLET BY MOUTH 3 TIMES A DAY.   ketorolac (TORADOL) 10 mg tablet   No No   Sig: Take 1 tablet (10 mg total) by mouth every 6 (six) hours as needed for moderate pain   magnesium Oxide (MAG-OX) 400 mg TABS   No No   Sig: Take 1 tablet (400 mg total) by mouth daily   meclizine (ANTIVERT) 12.5 MG tablet   No No   Sig: Take 1 tablet (12.5 mg total) by mouth 3 (three) times a day as needed for dizziness   omeprazole (PriLOSEC) 40 MG capsule   No No   Sig: TAKE 1 CAPSULE BY MOUTH 2 TIMES A DAY BEFORE MEALS.   rizatriptan (MAXALT) 10 mg tablet   No No   Sig: TAKE 1 TABLET (10 MG TOTAL) BY MOUTH AS NEEDED FOR MIGRAINE TAKE AT THE ONSET OF MIGRAINE; IF SYMPTOMS CONTINUE OR RETURN, MAY TAKE ANOTHER DOSE AT LEAST 2 HOURS AFTER FIRST DOSE. TAKE NO MORE THAN 2 DOSES IN A DAY.   valsartan (DIOVAN) 320 MG tablet   No No   Sig: TAKE 1 TABLET BY MOUTH EVERY  DAY      Facility-Administered Medications: None     Discharge Medication List as of 6/17/2025  6:12 AM        CONTINUE these medications which have NOT CHANGED    Details   amLODIPine (NORVASC) 5 mg tablet TAKE 1 TABLET (5 MG TOTAL) BY MOUTH DAILY., Starting Sat 6/29/2024, Normal      atorvastatin (LIPITOR) 10 mg tablet TAKE 1 TABLET BY MOUTH EVERY DAY, Normal      Cyanocobalamin 1000 MCG SUBL Place 1 tablet (1,000 mcg total) under the tongue daily, Starting Sat 4/15/2023, Normal      dicyclomine (BENTYL) 20 mg tablet TAKE 1 TABLET BY MOUTH 3 TIMES A DAY., Starting Mon 9/18/2023, Normal      ketorolac (TORADOL) 10 mg tablet Take 1 tablet (10 mg total) by mouth every 6 (six) hours as needed for moderate pain, Starting Sat 4/15/2023, Normal      magnesium Oxide (MAG-OX) 400 mg TABS Take 1 tablet (400 mg total) by mouth daily, Starting Sat 4/15/2023, Normal      meclizine (ANTIVERT) 12.5 MG tablet Take 1 tablet (12.5 mg total) by mouth 3 (three) times a day as needed for dizziness, Starting Thu 7/27/2023, Normal      omeprazole (PriLOSEC) 40 MG capsule TAKE 1 CAPSULE BY MOUTH 2 TIMES A DAY BEFORE MEALS., Normal      PARoxetine (PAXIL) 10 mg tablet Take 1 tablet (10 mg total) by mouth daily, Starting Tue 8/6/2024, Normal      rizatriptan (MAXALT) 10 mg tablet TAKE 1 TABLET (10 MG TOTAL) BY MOUTH AS NEEDED FOR MIGRAINE TAKE AT THE ONSET OF MIGRAINE; IF SYMPTOMS CONTINUE OR RETURN, MAY TAKE ANOTHER DOSE AT LEAST 2 HOURS AFTER FIRST DOSE. TAKE NO MORE THAN 2 DOSES IN A DAY., Starting Tue 3/14/2023, Normal      valsartan (DIOVAN) 320 MG tablet TAKE 1 TABLET BY MOUTH EVERY DAY, Starting Mon 5/27/2024, Normal           No discharge procedures on file.  ED SEPSIS DOCUMENTATION   Time reflects when diagnosis was documented in both MDM as applicable and the Disposition within this note       Time User Action Codes Description Comment    6/17/2025  5:53 AM Michelle Arzola [R07.9] Chest pain with low risk of acute coronary  syndrome     6/17/2025  6:11 AM Michelle Arzola [R06.02] SOB (shortness of breath)     6/17/2025  6:12 AM Michelle Arozla [R79.89] Elevated TSH                    [1]   Past Medical History:  Diagnosis Date    Allergic     Depression     Elevated liver enzymes     Fatty liver     Heartburn     Hypertension 04/2021    Psychiatric disorder     depression    Renal disorder     stones   [2]   Past Surgical History:  Procedure Laterality Date    COLONOSCOPY      IN COLONOSCOPY FLX DX W/COLLJ SPEC WHEN PFRMD N/A 6/11/2018    Procedure: EGD AND COLONOSCOPY;  Surgeon: Leonarda Fang MD;  Location: [facility];  Service: Gastroenterology    IN LITHOTRIPSY XTRCORP SHOCK WAVE Left 11/4/2016    Procedure: LITHROTRIPSY EXTRACORPORAL SHOCKWAVE (ESWL);  Surgeon: Sawyer Childers MD;  Location: [facility];  Service: Urology    TOOTH EXTRACTION      UPPER GASTROINTESTINAL ENDOSCOPY     [3]   Family History  Problem Relation Name Age of Onset    Migraines Mother Megan     Depression Mother Jane Duane    Crohn's disease Father William Duane     Depression Father William Duane     Colon cancer Father William Duane     Cancer Father William Duane     Liver disease Brother      Tourette syndrome Brother      Diabetes Paternal Grandmother     [4]   Social History  Tobacco Use    Smoking status: Light Smoker     Current packs/day: 0.25     Average packs/day: 0.3 packs/day for 24.5 years (6.1 ttl pk-yrs)     Types: Cigarettes     Start date: 1/1/2001    Smokeless tobacco: Never   Vaping Use    Vaping status: Never Used   Substance Use Topics    Alcohol use: Yes     Comment: socially    Drug use: No        Michelle Arzola MD  06/17/25 0656

## 2025-06-18 LAB
ATRIAL RATE: 79 BPM
P AXIS: 80 DEGREES
PR INTERVAL: 154 MS
QRS AXIS: 71 DEGREES
QRSD INTERVAL: 90 MS
QT INTERVAL: 342 MS
QTC INTERVAL: 392 MS
T WAVE AXIS: 70 DEGREES
VENTRICULAR RATE: 79 BPM

## 2025-06-18 PROCEDURE — 93010 ELECTROCARDIOGRAM REPORT: CPT | Performed by: INTERNAL MEDICINE

## 2025-07-01 ENCOUNTER — TELEPHONE (OUTPATIENT)
Dept: OTHER | Facility: HOSPITAL | Age: 45
End: 2025-07-01

## 2025-07-01 ENCOUNTER — APPOINTMENT (EMERGENCY)
Dept: CT IMAGING | Facility: HOSPITAL | Age: 45
End: 2025-07-01
Payer: COMMERCIAL

## 2025-07-01 ENCOUNTER — HOSPITAL ENCOUNTER (EMERGENCY)
Facility: HOSPITAL | Age: 45
Discharge: HOME | End: 2025-07-01
Attending: EMERGENCY MEDICINE
Payer: COMMERCIAL

## 2025-07-01 VITALS
DIASTOLIC BLOOD PRESSURE: 90 MMHG | BODY MASS INDEX: 28.22 KG/M2 | RESPIRATION RATE: 18 BRPM | SYSTOLIC BLOOD PRESSURE: 148 MMHG | WEIGHT: 185.63 LBS | OXYGEN SATURATION: 98 % | HEART RATE: 89 BPM | TEMPERATURE: 98.2 F

## 2025-07-01 DIAGNOSIS — N20.0 RENAL CALCULUS, LEFT: Primary | ICD-10-CM

## 2025-07-01 LAB
ALBUMIN SERPL BCG-MCNC: 4.6 G/DL (ref 3.5–5)
ALP SERPL-CCNC: 51 U/L (ref 34–104)
ALT SERPL W P-5'-P-CCNC: 27 U/L (ref 7–52)
ANION GAP SERPL CALCULATED.3IONS-SCNC: 6 MMOL/L (ref 4–13)
AST SERPL W P-5'-P-CCNC: 25 U/L (ref 13–39)
BACTERIA UR QL AUTO: ABNORMAL /HPF
BASOPHILS # BLD AUTO: 0.07 THOUSANDS/ÂΜL (ref 0–0.1)
BASOPHILS NFR BLD AUTO: 1 % (ref 0–1)
BILIRUB SERPL-MCNC: 0.43 MG/DL (ref 0.2–1)
BILIRUB UR QL STRIP: NEGATIVE
BUN SERPL-MCNC: 15 MG/DL (ref 5–25)
CALCIUM SERPL-MCNC: 9.7 MG/DL (ref 8.4–10.2)
CHLORIDE SERPL-SCNC: 102 MMOL/L (ref 96–108)
CLARITY UR: CLEAR
CO2 SERPL-SCNC: 28 MMOL/L (ref 21–32)
COLOR UR: COLORLESS
CREAT SERPL-MCNC: 0.91 MG/DL (ref 0.6–1.3)
EOSINOPHIL # BLD AUTO: 0.16 THOUSAND/ÂΜL (ref 0–0.61)
EOSINOPHIL NFR BLD AUTO: 2 % (ref 0–6)
ERYTHROCYTE [DISTWIDTH] IN BLOOD BY AUTOMATED COUNT: 12.3 % (ref 11.6–15.1)
GFR SERPL CREATININE-BSD FRML MDRD: 102 ML/MIN/1.73SQ M
GLUCOSE SERPL-MCNC: 99 MG/DL (ref 65–140)
GLUCOSE UR STRIP-MCNC: NEGATIVE MG/DL
HCT VFR BLD AUTO: 44.7 % (ref 36.5–49.3)
HGB BLD-MCNC: 15.1 G/DL (ref 12–17)
HGB UR QL STRIP.AUTO: ABNORMAL
IMM GRANULOCYTES # BLD AUTO: 0.01 THOUSAND/UL (ref 0–0.2)
IMM GRANULOCYTES NFR BLD AUTO: 0 % (ref 0–2)
KETONES UR STRIP-MCNC: NEGATIVE MG/DL
LEUKOCYTE ESTERASE UR QL STRIP: NEGATIVE
LIPASE SERPL-CCNC: 26 U/L (ref 11–82)
LYMPHOCYTES # BLD AUTO: 3.54 THOUSANDS/ÂΜL (ref 0.6–4.47)
LYMPHOCYTES NFR BLD AUTO: 46 % (ref 14–44)
MCH RBC QN AUTO: 29.2 PG (ref 26.8–34.3)
MCHC RBC AUTO-ENTMCNC: 33.8 G/DL (ref 31.4–37.4)
MCV RBC AUTO: 87 FL (ref 82–98)
MONOCYTES # BLD AUTO: 0.57 THOUSAND/ÂΜL (ref 0.17–1.22)
MONOCYTES NFR BLD AUTO: 7 % (ref 4–12)
NEUTROPHILS # BLD AUTO: 3.46 THOUSANDS/ÂΜL (ref 1.85–7.62)
NEUTS SEG NFR BLD AUTO: 44 % (ref 43–75)
NITRITE UR QL STRIP: NEGATIVE
NON-SQ EPI CELLS URNS QL MICRO: ABNORMAL /HPF
NRBC BLD AUTO-RTO: 0 /100 WBCS
PH UR STRIP.AUTO: 6 [PH]
PLATELET # BLD AUTO: 312 THOUSANDS/UL (ref 149–390)
PMV BLD AUTO: 9.6 FL (ref 8.9–12.7)
POTASSIUM SERPL-SCNC: 3.7 MMOL/L (ref 3.5–5.3)
PROT SERPL-MCNC: 6.8 G/DL (ref 6.4–8.4)
PROT UR STRIP-MCNC: NEGATIVE MG/DL
RBC # BLD AUTO: 5.17 MILLION/UL (ref 3.88–5.62)
RBC #/AREA URNS AUTO: ABNORMAL /HPF
SODIUM SERPL-SCNC: 136 MMOL/L (ref 135–147)
SP GR UR STRIP.AUTO: 1.01 (ref 1–1.03)
UROBILINOGEN UR STRIP-ACNC: <2 MG/DL
WBC # BLD AUTO: 7.81 THOUSAND/UL (ref 4.31–10.16)
WBC #/AREA URNS AUTO: ABNORMAL /HPF

## 2025-07-01 PROCEDURE — 83690 ASSAY OF LIPASE: CPT

## 2025-07-01 PROCEDURE — 85025 COMPLETE CBC W/AUTO DIFF WBC: CPT

## 2025-07-01 PROCEDURE — 74177 CT ABD & PELVIS W/CONTRAST: CPT

## 2025-07-01 PROCEDURE — 99284 EMERGENCY DEPT VISIT MOD MDM: CPT

## 2025-07-01 PROCEDURE — 80053 COMPREHEN METABOLIC PANEL: CPT

## 2025-07-01 PROCEDURE — 81001 URINALYSIS AUTO W/SCOPE: CPT

## 2025-07-01 PROCEDURE — 36415 COLL VENOUS BLD VENIPUNCTURE: CPT

## 2025-07-01 PROCEDURE — 99285 EMERGENCY DEPT VISIT HI MDM: CPT | Performed by: EMERGENCY MEDICINE

## 2025-07-01 RX ORDER — NAPROXEN 500 MG/1
500 TABLET ORAL 2 TIMES DAILY WITH MEALS
Qty: 30 TABLET | Refills: 0 | Status: SHIPPED | OUTPATIENT
Start: 2025-07-01

## 2025-07-01 RX ORDER — ONDANSETRON 4 MG/1
4 TABLET, ORALLY DISINTEGRATING ORAL EVERY 6 HOURS PRN
Qty: 20 TABLET | Refills: 0 | Status: SHIPPED | OUTPATIENT
Start: 2025-07-01

## 2025-07-01 RX ORDER — TAMSULOSIN HYDROCHLORIDE 0.4 MG/1
0.4 CAPSULE ORAL
Qty: 10 CAPSULE | Refills: 0 | Status: SHIPPED | OUTPATIENT
Start: 2025-07-01 | End: 2025-07-14

## 2025-07-01 RX ADMIN — IOHEXOL 85 ML: 350 INJECTION, SOLUTION INTRAVENOUS at 06:59

## 2025-07-01 NOTE — ED ATTENDING ATTESTATION
7/1/2025  IAngela DO, saw and evaluated the patient. I have discussed the patient with the resident/non-physician practitioner and agree with the resident's/non-physician practitioner's findings, Plan of Care, and MDM as documented in the resident's/non-physician practitioner's note, except where noted. All available labs and Radiology studies were reviewed.  I was present for key portions of any procedure(s) performed by the resident/non-physician practitioner and I was immediately available to provide assistance.       At this point I agree with the current assessment done in the Emergency Department.  I have conducted an independent evaluation of this patient a history and physical is as follows:    44-year-old male presenting to the emergency department with left flank pain that started on Saturday, has been intermittent since then.  States its better with rest with sitting and laying down, worse when he is up and doing something.  No fevers or chills.  No nausea or vomiting, no constipation or diarrhea.  Has a history of kidney stones, thinks this feels similar.  Currently does not have any pain.  No hematuria or frequency or dysuria or other urinary symptoms.    Upon physical semination, patient overall appears well, not in acute distress, heart regular rate, no increased work of breathing, abdomen soft nontender, no CVA or flank tenderness.    Obtain blood work, UA, CT abdomen/pelvis.    CT scan revealed calculus as described below.  UA not concerning for infection, no DECLAN.  Pain is controlled.  Discussed with urology given size of the stone, recommending discharge with outpatient follow-up.  Patient was updated with results and plan.    ED Course         Critical Care Time  Procedures    CT abdomen pelvis with contrast   Final Result      1 x 0.8 cm left renal pelvic calculus with mild lower pole caliectasis. No perinephric collection.      Additional chronic findings and negatives as above.       The study was marked in EPIC for immediate notification.         Workstation performed: [id number]

## 2025-07-01 NOTE — ED PROVIDER NOTES
Time reflects when diagnosis was documented in both MDM as applicable and the Disposition within this note       Time User Action Codes Description Comment    7/1/2025  8:10 AM Andrea Stockton Add [N20.0] Renal calculus, left           ED Disposition       ED Disposition   Discharge    Condition   Stable    Date/Time   Tue Jul 1, 2025  8:09 AM    Comment   Brian M Duane discharge to home/self care.                   Assessment & Plan       Medical Decision Making  Male patient who presented to the emergency department for left-sided flank pain.  On physical examination, patient had mild tenderness to palpation of the LUQ.  Patient's labs showed hematuria however no other acute concerns.  Patient CT imaging showed a 1 cm left renal pelvic calculi as per radiology.  Patient's case was discussed with urology who suggested patient was stable for discharge and is to follow-up outpatient.  They recommend sending patient with prescription for Flomax, pain medication, and Zofran.  Patient was plan for discharge and advised to follow-up outpatient with PCP as well as urology referral provided.  He was instructed to return the emergency department if his symptoms worsen.  He was agreeable to plan.    Differential diagnoses include but not limited to renal calculus, urinary tract infection, cystitis, pyelonephritis, bowel obstruction, diverticulitis, hernia, doubt testicular torsion.    Amount and/or Complexity of Data Reviewed  Labs: ordered. Decision-making details documented in ED Course.  Radiology: ordered. Decision-making details documented in ED Course.    Risk  Prescription drug management.        ED Course as of 07/01/25 0840   Tue Jul 01, 2025   0649 LIPASE: 26   0649 CBC and CMP grossly normal.   0726 CT abdomen pelvis with contrast  1 x 0.8 cm left renal pelvic calculus with mild lower pole caliectasis. No perinephric collection.     Additional chronic findings and negatives as above.     0732 Blood, UA(!): Large    0732 RBC Urine(!): 10-20   0808 Spoke to RICHIE Samuels of urology who states patient is stable for discharge and is to follow-up outpatient.  She also recommends sending prescription for Flomax, pain medication, and Zofran.  Patient is to strain his urine with strainer and to return to the emergency department if his symptoms worsen.       Medications   iohexol (OMNIPAQUE) 350 MG/ML injection (MULTI-DOSE) 85 mL (85 mL Intravenous Given 7/1/25 0659)       ED Risk Strat Scores                    No data recorded                            History of Present Illness       Chief Complaint   Patient presents with    Flank Pain     Left flank pain started Saturday slowly worsening. Radiates to abdomen. + nausea. H/o kidney stones.        Past Medical History[1]   Past Surgical History[2]   Family History[3]   Social History[4]   E-Cigarette/Vaping    E-Cigarette Use Never User       E-Cigarette/Vaping Substances    Nicotine No     THC No     CBD No     Flavoring No     Other No     Unknown No       I have reviewed and agree with the history as documented.     44-year-old male with significant PMH including HTN and renal calculi who presents emergency department for left-sided abdominal pain.  Patient states that for the past few days he has been having pain in his left flank and left groin.  He states that the pain is made worse when he is on his feet and will come and go.  Patient feels that the pain radiates into his groin along with his testicle.  He states the pain feels similar to previous kidney stones and is a pressure-like sensation.  Also endorsing nausea.  Patient has been taking Motrin at home with minimal to no relief in his symptoms.  Still tolerating p.o. intake with normal urinary and bowel habits.  No other acute concerns at this time. Denies chest pain, SOB, cough, v/d, fever, chills, dizziness, lightheadedness, HA, dysuria, hematuria, hematochezia, or melena.           Review of Systems    Constitutional:  Negative for appetite change, chills, diaphoresis, fatigue and fever.   HENT: Negative.  Negative for congestion, ear discharge, ear pain, postnasal drip, rhinorrhea, sore throat and trouble swallowing.    Eyes: Negative.  Negative for pain and visual disturbance.   Respiratory: Negative.  Negative for cough and shortness of breath.    Cardiovascular: Negative.  Negative for chest pain.   Gastrointestinal:  Positive for abdominal pain and nausea. Negative for blood in stool, constipation, diarrhea and vomiting.   Genitourinary:  Positive for flank pain. Negative for decreased urine volume, difficulty urinating, dysuria and hematuria.   Musculoskeletal:  Negative for arthralgias and back pain.   Skin: Negative.  Negative for color change and rash.   Neurological: Negative.  Negative for dizziness, syncope, weakness, light-headedness and headaches.           Objective       ED Triage Vitals [07/01/25 0555]   Temperature Pulse Blood Pressure Respirations SpO2 Patient Position - Orthostatic VS   98.2 °F (36.8 °C) 89 148/90 18 98 % Sitting      Temp Source Heart Rate Source BP Location FiO2 (%) Pain Score    Oral Monitor Right arm -- No Pain      Vitals      Date and Time Temp Pulse SpO2 Resp BP Pain Score FACES Pain Rating User   07/01/25 0555 98.2 °F (36.8 °C) 89 98 % 18 148/90 No Pain -- DB            Physical Exam  Vitals and nursing note reviewed.   Constitutional:       General: He is not in acute distress.     Appearance: Normal appearance. He is normal weight.   HENT:      Head: Normocephalic and atraumatic.      Right Ear: External ear normal.      Left Ear: External ear normal.      Nose: Nose normal.      Mouth/Throat:      Pharynx: Oropharynx is clear.     Eyes:      Conjunctiva/sclera: Conjunctivae normal.       Cardiovascular:      Rate and Rhythm: Normal rate and regular rhythm.      Pulses: Normal pulses.      Heart sounds: Normal heart sounds.      Comments: RRR with +S1 and S2, no  murmurs appreciated on exam. Peripheral pulses intact.    Pulmonary:      Effort: Pulmonary effort is normal. No respiratory distress.      Breath sounds: Normal breath sounds. No wheezing, rhonchi or rales.      Comments: CTABL with no abnormal lung sounds such as wheezes or rales appreciated on exam.     Abdominal:      General: Abdomen is flat. Bowel sounds are normal. There is no distension.      Palpations: Abdomen is soft.      Tenderness: There is abdominal tenderness. There is no right CVA tenderness or left CVA tenderness.      Comments: Mild tenderness to palpation of the LUQ.  Soft, normo-active bowel sounds. Without rigidity, guarding, or distension.     Genitourinary:     Penis: Normal.       Testes: Normal.      Comments: External genitalia is normal in appearance with macular lesion on the dorsal base. No tenderness to palpation of the penis or testes noted. No scrotal mass noted.  Intact bilateral cremasteric reflexes with no noted unilateral testicular elevation.  No urethral discharge. No signs of abrasions, ecchymosis, erythema, or gross deformity was noted.       Musculoskeletal:         General: Normal range of motion.      Cervical back: Normal range of motion.     Skin:     General: Skin is warm and dry.     Neurological:      General: No focal deficit present.      Mental Status: He is alert and oriented to person, place, and time. Mental status is at baseline.      Comments: CN grossly intact on visualization. No focal neurologic deficits noted on exam. 5/5 strength in all extremities. Neurovascularly intact with normal sensation and motor function.           Results Reviewed       Procedure Component Value Units Date/Time    Urine Microscopic [[id number]]  (Abnormal) Collected: 07/01/25 0713    Lab Status: Final result Specimen: Urine, Clean Catch Updated: 07/01/25 0730     RBC, UA 10-20 /hpf      WBC, UA 1-2 /hpf      Epithelial Cells Occasional /hpf      Bacteria, UA Occasional /hpf     UA  w Reflex to Microscopic w Reflex to Culture [[id number]]  (Abnormal) Collected: 07/01/25 0713    Lab Status: Final result Specimen: Urine, Clean Catch Updated: 07/01/25 0728     Color, UA Colorless     Clarity, UA Clear     Specific Gravity, UA 1.008     pH, UA 6.0     Leukocytes, UA Negative     Nitrite, UA Negative     Protein, UA Negative mg/dl      Glucose, UA Negative mg/dl      Ketones, UA Negative mg/dl      Urobilinogen, UA <2.0 mg/dl      Bilirubin, UA Negative     Occult Blood, UA Large    Comprehensive metabolic panel [[id number]] Collected: 07/01/25 0622    Lab Status: Final result Specimen: Blood from Arm, Right Updated: 07/01/25 0647     Sodium 136 mmol/L      Potassium 3.7 mmol/L      Chloride 102 mmol/L      CO2 28 mmol/L      ANION GAP 6 mmol/L      BUN 15 mg/dL      Creatinine 0.91 mg/dL      Glucose 99 mg/dL      Calcium 9.7 mg/dL      AST 25 U/L      ALT 27 U/L      Alkaline Phosphatase 51 U/L      Total Protein 6.8 g/dL      Albumin 4.6 g/dL      Total Bilirubin 0.43 mg/dL      eGFR 102 ml/min/1.73sq m     Narrative:      National Kid[employer] guidelines for Chronic Kidney Disease (CKD):     Stage 1 with normal or high GFR (GFR > 90 mL/min/1.73 square meters)    Stage 2 Mild CKD (GFR = 60-89 mL/min/1.73 square meters)    Stage 3A Moderate CKD (GFR = 45-59 mL/min/1.73 square meters)    Stage 3B Moderate CKD (GFR = 30-44 mL/min/1.73 square meters)    Stage 4 Severe CKD (GFR = 15-29 mL/min/1.73 square meters)    Stage 5 End Stage CKD (GFR <15 mL/min/1.73 square meters)  Note: GFR calculation is accurate only with a steady state creatinine    Lipase [[id number]]  (Normal) Collected: 07/01/25 0622    Lab Status: Final result Specimen: Blood from Arm, Right Updated: 07/01/25 0647     Lipase 26 u/L     CBC and differential [[id number]]  (Abnormal) Collected: 07/01/25 0622    Lab Status: Final result Specimen: Blood from Arm, Right Updated: 07/01/25 0636     WBC 7.81 Thousand/uL      RBC 5.17  Million/uL      Hemoglobin 15.1 g/dL      Hematocrit 44.7 %      MCV 87 fL      MCH 29.2 pg      MCHC 33.8 g/dL      RDW 12.3 %      MPV 9.6 fL      Platelets 312 Thousands/uL      nRBC 0 /100 WBCs      Segmented % 44 %      Immature Grans % 0 %      Lymphocytes % 46 %      Monocytes % 7 %      Eosinophils Relative 2 %      Basophils Relative 1 %      Absolute Neutrophils 3.46 Thousands/µL      Absolute Immature Grans 0.01 Thousand/uL      Absolute Lymphocytes 3.54 Thousands/µL      Absolute Monocytes 0.57 Thousand/µL      Eosinophils Absolute 0.16 Thousand/µL      Basophils Absolute 0.07 Thousands/µL             CT abdomen pelvis with contrast   Final Interpretation by Jaydon Menjivar MD (07/01 0724)      1 x 0.8 cm left renal pelvic calculus with mild lower pole caliectasis. No perinephric collection.      Additional chronic findings and negatives as above.      The study was marked in EPIC for immediate notification.         Workstation performed: [id number]             Procedures    ED Medication and Procedure Management   Prior to Admission Medications   Prescriptions Last Dose Informant Patient Reported? Taking?   Cyanocobalamin 1000 MCG SUBL   No No   Sig: Place 1 tablet (1,000 mcg total) under the tongue daily   PARoxetine (PAXIL) 10 mg tablet   No No   Sig: Take 1 tablet (10 mg total) by mouth daily   amLODIPine (NORVASC) 5 mg tablet   No No   Sig: TAKE 1 TABLET (5 MG TOTAL) BY MOUTH DAILY.   atorvastatin (LIPITOR) 10 mg tablet   No No   Sig: TAKE 1 TABLET BY MOUTH EVERY DAY   dicyclomine (BENTYL) 20 mg tablet   No No   Sig: TAKE 1 TABLET BY MOUTH 3 TIMES A DAY.   ketorolac (TORADOL) 10 mg tablet   No No   Sig: Take 1 tablet (10 mg total) by mouth every 6 (six) hours as needed for moderate pain   magnesium Oxide (MAG-OX) 400 mg TABS   No No   Sig: Take 1 tablet (400 mg total) by mouth daily   meclizine (ANTIVERT) 12.5 MG tablet   No No   Sig: Take 1 tablet (12.5 mg total) by mouth 3 (three)  times a day as needed for dizziness   omeprazole (PriLOSEC) 40 MG capsule   No No   Sig: TAKE 1 CAPSULE BY MOUTH 2 TIMES A DAY BEFORE MEALS.   rizatriptan (MAXALT) 10 mg tablet   No No   Sig: TAKE 1 TABLET (10 MG TOTAL) BY MOUTH AS NEEDED FOR MIGRAINE TAKE AT THE ONSET OF MIGRAINE; IF SYMPTOMS CONTINUE OR RETURN, MAY TAKE ANOTHER DOSE AT LEAST 2 HOURS AFTER FIRST DOSE. TAKE NO MORE THAN 2 DOSES IN A DAY.   valsartan (DIOVAN) 320 MG tablet   No No   Sig: TAKE 1 TABLET BY MOUTH EVERY DAY      Facility-Administered Medications: None     Patient's Medications   Discharge Prescriptions    NAPROXEN (NAPROSYN) 500 MG TABLET    Take 1 tablet (500 mg total) by mouth 2 (two) times a day with meals       Start Date: 7/1/2025  End Date: --       Order Dose: 500 mg       Quantity: 30 tablet    Refills: 0    ONDANSETRON (ZOFRAN-ODT) 4 MG DISINTEGRATING TABLET    Take 1 tablet (4 mg total) by mouth every 6 (six) hours as needed for nausea or vomiting       Start Date: 7/1/2025  End Date: --       Order Dose: 4 mg       Quantity: 20 tablet    Refills: 0    TAMSULOSIN (FLOMAX) 0.4 MG    Take 1 capsule (0.4 mg total) by mouth daily with dinner for 10 days       Start Date: 7/1/2025  End Date: 7/11/2025       Order Dose: 0.4 mg       Quantity: 10 capsule    Refills: 0       ED SEPSIS DOCUMENTATION   Time reflects when diagnosis was documented in both MDM as applicable and the Disposition within this note       Time User Action Codes Description Comment    7/1/2025  8:10 AM Andrea Stockton Add [N20.0] Renal calculus, left                      [1]   Past Medical History:  Diagnosis Date    Allergic     Depression     Elevated liver enzymes     Fatty liver     Heartburn     Hypertension 04/2021    Psychiatric disorder     depression    Renal disorder     stones   [2]   Past Surgical History:  Procedure Laterality Date    COLONOSCOPY      IA COLONOSCOPY FLX DX W/COLLJ SPEC WHEN PFRMD N/A 6/11/2018    Procedure: EGD AND COLONOSCOPY;  Surgeon:  Leonarda Fang MD;  Location: [facility];  Service: Gastroenterology    OK LITHOTRIPSY XTRCORP SHOCK WAVE Left 11/4/2016    Procedure: LITHROTRIPSY EXTRACORPORAL SHOCKWAVE (ESWL);  Surgeon: Sawyer Childers MD;  Location: [facility];  Service: Urology    TOOTH EXTRACTION      UPPER GASTROINTESTINAL ENDOSCOPY     [3]   Family History  Problem Relation Name Age of Onset    Migraines Mother Megan     Depression Mother Jane Duane    Crohn's disease Father William Duane     Depression Father William Duane     Colon cancer Father William Duane     Cancer Father William Duane     Liver disease Brother      Tourette syndrome Brother      Diabetes Paternal Grandmother     [4]   Social History  Tobacco Use    Smoking status: Light Smoker     Current packs/day: 0.25     Average packs/day: 0.3 packs/day for 24.5 years (6.1 ttl pk-yrs)     Types: Cigarettes     Start date: 1/1/2001    Smokeless tobacco: Never   Vaping Use    Vaping status: Never Used   Substance Use Topics    Alcohol use: Yes     Comment: socially    Drug use: No        Andrea Stockton MD  07/01/25 0861

## 2025-07-11 NOTE — PROGRESS NOTES
7/14/2025      Chief Complaint   Patient presents with   • New Patient Visit     Re establish care & Er follow up for stone       Assessment and Plan    44 y.o. male managed by New patient     1. Renal calculus, left  Assessment & Plan:  ED visit 7/1 for left-sided flank pain  CT scan-1 x 0.8 cm left renal pelvic calculus with mild lower pole caliectasis. No perinephric collection.    Reviewed imaging with patient today through [facility]   Patient continues with intermittent left flank pain   Given location and size of stone plan to proceed with cystoscopy, uteroscopy with lithotripsy laser, retrograde pyelogram and left ureteral stent insertion  Consent reviewed and signed  Reviewed procedure and postop with patient  Case request placed  Patient aware surgical scheduler will be reaching out for potential surgery dates  Reviewed ED precautions and comfort measures in the meantime  Orders:  -     Ambulatory Referral to Urology  -     Case request operating room: CYSTOSCOPY URETEROSCOPY WITH LITHOTRIPSY HOLMIUM LASER, RETROGRADE PYELOGRAM AND INSERTION STENT URETERAL; Standing  -     Case request operating room: CYSTOSCOPY URETEROSCOPY WITH LITHOTRIPSY HOLMIUM LASER, RETROGRADE PYELOGRAM AND INSERTION STENT URETERAL      History of Present Illness  Brian M Duane is a 44 y.o. male here for evaluation of nephrolithiasis.  Patient has a history of nephrolithiasis.  Last having ESWL back in 2016 for 7 mm nonobstructing stone.  Patient presented to the ED most recently with left-sided flank pain.  CT scan was obtained.  He continues with intermittent left-sided flank pain that is sharp at times.  Denies fevers chills or any issues with urination at this time.        Review of Systems   Constitutional:  Negative for chills and fever.   HENT:  Negative for ear pain and sore throat.    Eyes:  Negative for pain and visual disturbance.   Respiratory:  Negative for cough and shortness of breath.    Cardiovascular:  Negative for  "chest pain and palpitations.   Gastrointestinal:  Negative for abdominal pain and vomiting.   Genitourinary:  Positive for flank pain (left). Negative for difficulty urinating, dysuria and hematuria.   Musculoskeletal:  Negative for arthralgias and back pain.   Skin:  Negative for color change and rash.   Neurological:  Negative for seizures and syncope.   All other systems reviewed and are negative.          AUA SYMPTOM SCORE      Flowsheet Row Most Recent Value   AUA SYMPTOM SCORE    How often have you had a sensation of not emptying your bladder completely after you finished urinating? 0 (P)     How often have you had to urinate again less than two hours after you finished urinating? 0 (P)     How often have you found you stopped and started again several times when you urinate? 0 (P)     How often have you found it difficult to postpone urination? 0 (P)     How often have you had a weak urinary stream? 0 (P)     How often have you had to push or strain to begin urination? 0 (P)     How many times did you most typically get up to urinate from the time you went to bed at night until the time you got up in the morning? 0 (P)     Quality of Life: If you were to spend the rest of your life with your urinary condition just the way it is now, how would you feel about that? 1 (P)     AUA SYMPTOM SCORE 0 (P)               Vitals  Vitals:    07/14/25 0950   Pulse: 100   SpO2: 99%   Weight: 80.7 kg (178 lb)   Height: 5' 10\" (1.778 m)       Physical Exam  Vitals reviewed.   Constitutional:       Appearance: Normal appearance.   HENT:      Head: Normocephalic and atraumatic.     Eyes:      Conjunctiva/sclera: Conjunctivae normal.     Pulmonary:      Effort: Pulmonary effort is normal.   Abdominal:      General: Abdomen is flat. There is no distension.      Palpations: Abdomen is soft.      Tenderness: There is no abdominal tenderness.     Musculoskeletal:         General: Normal range of motion.      Cervical back: Normal " "range of motion.     Skin:     General: Skin is warm and dry.     Neurological:      General: No focal deficit present.      Mental Status: He is alert and oriented to person, place, and time.     Psychiatric:         Mood and Affect: Mood normal.         Behavior: Behavior normal.         Thought Content: Thought content normal.         Judgment: Judgment normal.           Past History  Past Medical History[1]  Social History[2]  Tobacco Use History[3]  Family History[4]    The following portions of the patient's history were reviewed and updated as appropriate: allergies, current medications, past medical history, past social history, past surgical history and problem list.    Results  No results found for this or any previous visit (from the past hour).]  No results found for: \"PSA\"  Lab Results   Component Value Date    GLUCOSE 103 12/07/2015    CALCIUM 9.7 07/01/2025     12/07/2015    K 3.7 07/01/2025    CO2 28 07/01/2025     07/01/2025    BUN 15 07/01/2025    CREATININE 0.91 07/01/2025     Lab Results   Component Value Date    WBC 7.81 07/01/2025    HGB 15.1 07/01/2025    HCT 44.7 07/01/2025    MCV 87 07/01/2025     07/01/2025              [1]  Past Medical History:  Diagnosis Date   • Allergic    • Depression    • Elevated liver enzymes    • Fatty liver    • Heartburn    • Hypertension 04/2021   • Psychiatric disorder     depression   • Renal disorder     stones   [2]  Social History  Socioeconomic History   • Marital status: Single   Tobacco Use   • Smoking status: Light Smoker     Current packs/day: 0.25     Average packs/day: 0.3 packs/day for 24.5 years (6.1 ttl pk-yrs)     Types: Cigarettes     Start date: 1/1/2001   • Smokeless tobacco: Never   Vaping Use   • Vaping status: Never Used   Substance and Sexual Activity   • Alcohol use: Yes     Comment: socially   • Drug use: No   • Sexual activity: Yes     Partners: Female     Birth control/protection: Condom Male   [3]  Social " History  Tobacco Use   Smoking Status Light Smoker   • Current packs/day: 0.25   • Average packs/day: 0.3 packs/day for 24.5 years (6.1 ttl pk-yrs)   • Types: Cigarettes   • Start date: 1/1/2001   Smokeless Tobacco Never   [4]  Family History  Problem Relation Name Age of Onset   • Migraines Mother Megan    • Depression Mother Jane Duane   • Crohn's disease Father William Duane    • Depression Father William Duane    • Colon cancer Father William Duane    • Cancer Father William Duane    • Liver disease Brother     • Tourette syndrome Brother     • Diabetes Paternal Grandmother

## 2025-07-14 ENCOUNTER — CONSULT (OUTPATIENT)
Dept: UROLOGY | Facility: MEDICAL CENTER | Age: 45
End: 2025-07-14
Attending: EMERGENCY MEDICINE
Payer: COMMERCIAL

## 2025-07-14 VITALS — BODY MASS INDEX: 25.48 KG/M2 | WEIGHT: 178 LBS | HEART RATE: 100 BPM | OXYGEN SATURATION: 99 % | HEIGHT: 70 IN

## 2025-07-14 DIAGNOSIS — N20.0 RENAL CALCULUS, LEFT: ICD-10-CM

## 2025-07-14 PROCEDURE — 99203 OFFICE O/P NEW LOW 30 MIN: CPT

## 2025-07-14 RX ORDER — LISDEXAMFETAMINE DIMESYLATE 40 MG/1
CAPSULE ORAL
COMMUNITY
Start: 2024-08-11

## 2025-07-14 NOTE — ASSESSMENT & PLAN NOTE
ED visit 7/1 for left-sided flank pain  CT scan-1 x 0.8 cm left renal pelvic calculus with mild lower pole caliectasis. No perinephric collection.    Reviewed imaging with patient today through [facility]   Patient continues with intermittent left flank pain   Given location and size of stone plan to proceed with cystoscopy, uteroscopy with lithotripsy laser, retrograde pyelogram and left ureteral stent insertion  Consent reviewed and signed  Reviewed procedure and postop with patient  Case request placed  Patient aware surgical scheduler will be reaching out for potential surgery dates  Reviewed ED precautions and comfort measures in the meantime

## 2025-08-19 ENCOUNTER — PREP FOR PROCEDURE (OUTPATIENT)
Dept: UROLOGY | Facility: MEDICAL CENTER | Age: 45
End: 2025-08-19

## 2025-08-19 DIAGNOSIS — R39.89 SUSPECTED UTI: Primary | ICD-10-CM

## 2025-08-19 DIAGNOSIS — Z01.812 PRE-OPERATIVE LABORATORY EXAMINATION: ICD-10-CM

## 2025-08-19 DIAGNOSIS — N20.0 RENAL CALCULUS, LEFT: ICD-10-CM

## 2025-08-23 ENCOUNTER — ANESTHESIA EVENT (OUTPATIENT)
Dept: PERIOP | Facility: HOSPITAL | Age: 45
End: 2025-08-23
Payer: COMMERCIAL

## 2025-08-23 ENCOUNTER — ANESTHESIA (OUTPATIENT)
Dept: PERIOP | Facility: HOSPITAL | Age: 45
End: 2025-08-23
Payer: COMMERCIAL

## 2025-08-23 RX ORDER — PROPOFOL 10 MG/ML
INJECTION, EMULSION INTRAVENOUS AS NEEDED
Status: DISCONTINUED | OUTPATIENT
Start: 2025-08-23 | End: 2025-08-23

## 2025-08-23 RX ORDER — SODIUM CHLORIDE, SODIUM LACTATE, POTASSIUM CHLORIDE, CALCIUM CHLORIDE 600; 310; 30; 20 MG/100ML; MG/100ML; MG/100ML; MG/100ML
INJECTION, SOLUTION INTRAVENOUS CONTINUOUS PRN
Status: DISCONTINUED | OUTPATIENT
Start: 2025-08-23 | End: 2025-08-23

## 2025-08-23 RX ORDER — CEFAZOLIN SODIUM 2 G/50ML
SOLUTION INTRAVENOUS AS NEEDED
Status: DISCONTINUED | OUTPATIENT
Start: 2025-08-23 | End: 2025-08-23

## 2025-08-23 RX ORDER — ONDANSETRON 2 MG/ML
INJECTION INTRAMUSCULAR; INTRAVENOUS AS NEEDED
Status: DISCONTINUED | OUTPATIENT
Start: 2025-08-23 | End: 2025-08-23

## 2025-08-23 RX ORDER — DEXAMETHASONE SODIUM PHOSPHATE 10 MG/ML
INJECTION, SOLUTION INTRAMUSCULAR; INTRAVENOUS AS NEEDED
Status: DISCONTINUED | OUTPATIENT
Start: 2025-08-23 | End: 2025-08-23

## 2025-08-23 RX ORDER — FENTANYL CITRATE 50 UG/ML
INJECTION, SOLUTION INTRAMUSCULAR; INTRAVENOUS AS NEEDED
Status: DISCONTINUED | OUTPATIENT
Start: 2025-08-23 | End: 2025-08-23

## 2025-08-23 RX ORDER — MIDAZOLAM HYDROCHLORIDE 2 MG/2ML
INJECTION, SOLUTION INTRAMUSCULAR; INTRAVENOUS AS NEEDED
Status: DISCONTINUED | OUTPATIENT
Start: 2025-08-23 | End: 2025-08-23

## 2025-08-23 RX ORDER — LIDOCAINE HYDROCHLORIDE 10 MG/ML
INJECTION, SOLUTION EPIDURAL; INFILTRATION; INTRACAUDAL; PERINEURAL AS NEEDED
Status: DISCONTINUED | OUTPATIENT
Start: 2025-08-23 | End: 2025-08-23

## 2025-08-23 RX ADMIN — PROPOFOL 50 MG: 10 INJECTION, EMULSION INTRAVENOUS at 13:13

## 2025-08-23 RX ADMIN — ONDANSETRON 4 MG: 2 INJECTION INTRAMUSCULAR; INTRAVENOUS at 13:13

## 2025-08-23 RX ADMIN — DEXAMETHASONE SODIUM PHOSPHATE 10 MG: 10 INJECTION, SOLUTION INTRAMUSCULAR; INTRAVENOUS at 13:13

## 2025-08-23 RX ADMIN — FENTANYL CITRATE 50 MCG: 50 INJECTION INTRAMUSCULAR; INTRAVENOUS at 13:52

## 2025-08-23 RX ADMIN — CEFAZOLIN SODIUM 2000 MG: 2 SOLUTION INTRAVENOUS at 13:19

## 2025-08-23 RX ADMIN — SODIUM CHLORIDE, SODIUM LACTATE, POTASSIUM CHLORIDE, AND CALCIUM CHLORIDE: .6; .31; .03; .02 INJECTION, SOLUTION INTRAVENOUS at 12:56

## 2025-08-23 RX ADMIN — MIDAZOLAM 2 MG: 1 INJECTION INTRAMUSCULAR; INTRAVENOUS at 13:05

## 2025-08-23 RX ADMIN — PROPOFOL 200 MG: 10 INJECTION, EMULSION INTRAVENOUS at 13:12

## 2025-08-23 RX ADMIN — LIDOCAINE HYDROCHLORIDE 50 MG: 10 INJECTION, SOLUTION EPIDURAL; INFILTRATION; INTRACAUDAL at 13:12

## 2025-08-23 RX ADMIN — FENTANYL CITRATE 50 MCG: 50 INJECTION INTRAMUSCULAR; INTRAVENOUS at 13:12
